# Patient Record
Sex: FEMALE | Race: BLACK OR AFRICAN AMERICAN | HISPANIC OR LATINO | Employment: FULL TIME | URBAN - METROPOLITAN AREA
[De-identification: names, ages, dates, MRNs, and addresses within clinical notes are randomized per-mention and may not be internally consistent; named-entity substitution may affect disease eponyms.]

---

## 2017-02-27 ENCOUNTER — TRANSCRIBE ORDERS (OUTPATIENT)
Dept: ADMINISTRATIVE | Facility: HOSPITAL | Age: 27
End: 2017-02-27

## 2017-02-27 DIAGNOSIS — E04.1 NONTOXIC UNINODULAR GOITER: Primary | ICD-10-CM

## 2017-03-03 ENCOUNTER — HOSPITAL ENCOUNTER (OUTPATIENT)
Dept: ULTRASOUND IMAGING | Facility: HOSPITAL | Age: 27
Discharge: HOME | End: 2017-03-03
Payer: COMMERCIAL

## 2017-03-03 DIAGNOSIS — E04.1 NONTOXIC UNINODULAR GOITER: ICD-10-CM

## 2017-03-03 PROCEDURE — 76536 US EXAM OF HEAD AND NECK: CPT

## 2017-03-28 ENCOUNTER — HOSPITAL ENCOUNTER (EMERGENCY)
Facility: HOSPITAL | Age: 27
Discharge: HOME | End: 2017-03-28
Attending: EMERGENCY MEDICINE | Admitting: EMERGENCY MEDICINE
Payer: COMMERCIAL

## 2017-03-28 ENCOUNTER — APPOINTMENT (EMERGENCY)
Dept: RADIOLOGY | Facility: HOSPITAL | Age: 27
End: 2017-03-28
Payer: COMMERCIAL

## 2017-03-28 VITALS
TEMPERATURE: 98.3 F | DIASTOLIC BLOOD PRESSURE: 70 MMHG | OXYGEN SATURATION: 99 % | WEIGHT: 224.21 LBS | HEART RATE: 65 BPM | RESPIRATION RATE: 18 BRPM | SYSTOLIC BLOOD PRESSURE: 120 MMHG

## 2017-03-28 DIAGNOSIS — M79.605 LEFT LEG PAIN: Primary | ICD-10-CM

## 2017-03-28 PROCEDURE — 73502 X-RAY EXAM HIP UNI 2-3 VIEWS: CPT

## 2017-03-28 PROCEDURE — 99283 EMERGENCY DEPT VISIT LOW MDM: CPT

## 2017-03-28 RX ORDER — PANTOPRAZOLE SODIUM 40 MG/1
40 TABLET, DELAYED RELEASE ORAL DAILY
COMMUNITY
End: 2021-03-05 | Stop reason: SDUPTHER

## 2017-03-28 RX ORDER — LIDOCAINE 50 MG/G
1 PATCH TOPICAL DAILY
Status: DISCONTINUED | OUTPATIENT
Start: 2017-03-28 | End: 2017-03-28 | Stop reason: HOSPADM

## 2017-03-28 RX ORDER — OXYCODONE HYDROCHLORIDE AND ACETAMINOPHEN 5; 325 MG/1; MG/1
1 TABLET ORAL ONCE
Status: COMPLETED | OUTPATIENT
Start: 2017-03-28 | End: 2017-03-28

## 2017-03-28 RX ORDER — MORPHINE SULFATE 30 MG/1
15 TABLET ORAL EVERY 4 HOURS PRN
Qty: 10 TABLET | Refills: 0 | Status: SHIPPED | OUTPATIENT
Start: 2017-03-28 | End: 2017-05-11

## 2017-03-28 RX ADMIN — OXYCODONE HYDROCHLORIDE AND ACETAMINOPHEN 1 TABLET: 5; 325 TABLET ORAL at 11:55

## 2017-03-28 RX ADMIN — LIDOCAINE 1 PATCH: 50 PATCH TOPICAL at 11:55

## 2017-05-11 ENCOUNTER — HOSPITAL ENCOUNTER (EMERGENCY)
Facility: HOSPITAL | Age: 27
Discharge: HOME | End: 2017-05-11
Attending: EMERGENCY MEDICINE | Admitting: EMERGENCY MEDICINE
Payer: COMMERCIAL

## 2017-05-11 VITALS
HEART RATE: 78 BPM | TEMPERATURE: 97.8 F | SYSTOLIC BLOOD PRESSURE: 112 MMHG | OXYGEN SATURATION: 100 % | DIASTOLIC BLOOD PRESSURE: 62 MMHG | RESPIRATION RATE: 14 BRPM | WEIGHT: 177.8 LBS

## 2017-05-11 DIAGNOSIS — R11.2 NAUSEA & VOMITING: Primary | ICD-10-CM

## 2017-05-11 DIAGNOSIS — R19.7 DIARRHEA, UNSPECIFIED TYPE: ICD-10-CM

## 2017-05-11 LAB
ALBUMIN SERPL BCP-MCNC: 3.9 G/DL (ref 3.5–5)
ALP SERPL-CCNC: 91 U/L (ref 46–116)
ALT SERPL W P-5'-P-CCNC: 23 U/L (ref 12–78)
ANION GAP SERPL CALCULATED.3IONS-SCNC: 14 MMOL/L (ref 4–13)
AST SERPL W P-5'-P-CCNC: 41 U/L (ref 5–45)
BASOPHILS # BLD AUTO: 0.01 THOUSANDS/ΜL (ref 0–0.1)
BASOPHILS NFR BLD AUTO: 0 % (ref 0–1)
BILIRUB SERPL-MCNC: 0.4 MG/DL (ref 0.2–1)
BUN SERPL-MCNC: 10 MG/DL (ref 5–25)
CALCIUM SERPL-MCNC: 8.9 MG/DL (ref 8.3–10.1)
CHLORIDE SERPL-SCNC: 104 MMOL/L (ref 100–108)
CLARITY, POC: CLEAR
CO2 SERPL-SCNC: 20 MMOL/L (ref 21–32)
COLOR, POC: NORMAL
CREAT SERPL-MCNC: 0.85 MG/DL (ref 0.6–1.3)
EOSINOPHIL # BLD AUTO: 0.05 THOUSAND/ΜL (ref 0–0.61)
EOSINOPHIL NFR BLD AUTO: 1 % (ref 0–6)
ERYTHROCYTE [DISTWIDTH] IN BLOOD BY AUTOMATED COUNT: 12.5 % (ref 11.6–15.1)
EXT BILIRUBIN, UA: NORMAL
EXT BLOOD URINE: NORMAL
EXT GLUCOSE, UA: NORMAL
EXT KETONES: NORMAL
EXT NITRITE, UA: NORMAL
EXT PH, UA: 6
EXT PROTEIN, UA: 30
EXT SPECIFIC GRAVITY, UA: 1.02
EXT UROBILINOGEN: 0.2
GFR SERPL CREATININE-BSD FRML MDRD: >60 ML/MIN/1.73SQ M
GLUCOSE SERPL-MCNC: 87 MG/DL (ref 65–140)
HCG UR QL: NEGATIVE
HCT VFR BLD AUTO: 43.8 % (ref 34.8–46.1)
HGB BLD-MCNC: 15.2 G/DL (ref 11.5–15.4)
LIPASE SERPL-CCNC: 100 U/L (ref 73–393)
LYMPHOCYTES # BLD AUTO: 0.69 THOUSANDS/ΜL (ref 0.6–4.47)
LYMPHOCYTES NFR BLD AUTO: 9 % (ref 14–44)
MCH RBC QN AUTO: 28.4 PG (ref 26.8–34.3)
MCHC RBC AUTO-ENTMCNC: 34.7 G/DL (ref 31.4–37.4)
MCV RBC AUTO: 82 FL (ref 82–98)
MONOCYTES # BLD AUTO: 0.84 THOUSAND/ΜL (ref 0.17–1.22)
MONOCYTES NFR BLD AUTO: 10 % (ref 4–12)
NEUTROPHILS # BLD AUTO: 6.52 THOUSANDS/ΜL (ref 1.85–7.62)
NEUTS SEG NFR BLD AUTO: 80 % (ref 43–75)
PLATELET # BLD AUTO: 274 THOUSANDS/UL (ref 149–390)
PMV BLD AUTO: 10.1 FL (ref 8.9–12.7)
POTASSIUM SERPL-SCNC: 4.7 MMOL/L (ref 3.5–5.3)
PROT SERPL-MCNC: 9 G/DL (ref 6.4–8.2)
RBC # BLD AUTO: 5.36 MILLION/UL (ref 3.81–5.12)
SODIUM SERPL-SCNC: 138 MMOL/L (ref 136–145)
WBC # BLD AUTO: 8.11 THOUSAND/UL (ref 4.31–10.16)
WBC # BLD EST: NORMAL 10*3/UL

## 2017-05-11 PROCEDURE — 81002 URINALYSIS NONAUTO W/O SCOPE: CPT | Performed by: PHYSICIAN ASSISTANT

## 2017-05-11 PROCEDURE — A9270 NON-COVERED ITEM OR SERVICE: HCPCS | Performed by: PHYSICIAN ASSISTANT

## 2017-05-11 PROCEDURE — 99284 EMERGENCY DEPT VISIT MOD MDM: CPT

## 2017-05-11 PROCEDURE — 83690 ASSAY OF LIPASE: CPT | Performed by: PHYSICIAN ASSISTANT

## 2017-05-11 PROCEDURE — 85025 COMPLETE CBC W/AUTO DIFF WBC: CPT | Performed by: PHYSICIAN ASSISTANT

## 2017-05-11 PROCEDURE — 80053 COMPREHEN METABOLIC PANEL: CPT | Performed by: PHYSICIAN ASSISTANT

## 2017-05-11 PROCEDURE — 81025 URINE PREGNANCY TEST: CPT | Performed by: PHYSICIAN ASSISTANT

## 2017-05-11 PROCEDURE — 96374 THER/PROPH/DIAG INJ IV PUSH: CPT

## 2017-05-11 PROCEDURE — 96361 HYDRATE IV INFUSION ADD-ON: CPT

## 2017-05-11 PROCEDURE — 36415 COLL VENOUS BLD VENIPUNCTURE: CPT | Performed by: PHYSICIAN ASSISTANT

## 2017-05-11 RX ORDER — DICYCLOMINE HCL 20 MG
20 TABLET ORAL ONCE
Status: COMPLETED | OUTPATIENT
Start: 2017-05-11 | End: 2017-05-11

## 2017-05-11 RX ORDER — ONDANSETRON 4 MG/1
4 TABLET, ORALLY DISINTEGRATING ORAL EVERY 8 HOURS PRN
Qty: 15 TABLET | Refills: 0 | Status: SHIPPED | OUTPATIENT
Start: 2017-05-11 | End: 2018-03-20

## 2017-05-11 RX ORDER — DICYCLOMINE HCL 20 MG
20 TABLET ORAL EVERY 6 HOURS
Qty: 12 TABLET | Refills: 0 | Status: SHIPPED | OUTPATIENT
Start: 2017-05-11 | End: 2018-03-20

## 2017-05-11 RX ORDER — ONDANSETRON 2 MG/ML
4 INJECTION INTRAMUSCULAR; INTRAVENOUS ONCE
Status: COMPLETED | OUTPATIENT
Start: 2017-05-11 | End: 2017-05-11

## 2017-05-11 RX ADMIN — ONDANSETRON 4 MG: 2 INJECTION INTRAMUSCULAR; INTRAVENOUS at 08:58

## 2017-05-11 RX ADMIN — SODIUM CHLORIDE 1000 ML: 0.9 INJECTION, SOLUTION INTRAVENOUS at 08:54

## 2017-05-11 RX ADMIN — DICYCLOMINE HYDROCHLORIDE 20 MG: 20 TABLET ORAL at 08:58

## 2018-01-18 NOTE — MISCELLANEOUS
Message  Return to work or school:   Nehemias Romero is under my professional care   She was seen in my office on 10/25/2016   Please excuse from work 10/25/2016 due to illness           Signatures   Electronically signed by : Alaina Aguilar [facility]; Oct 25 2016  6:00PM EST                       (Author)    Electronically signed by : Alaina Aguilar [facility]; Oct 27 2016  8:19AM EST

## 2018-01-18 NOTE — MISCELLANEOUS
Message  Return to work or school:    She is able to return to work on  12/22/2016            Signatures   Electronically signed by : Barry Galindo DO; Dec 20 2016  6:37PM EST                       (Author)

## 2018-03-20 ENCOUNTER — TELEPHONE (OUTPATIENT)
Dept: GASTROENTEROLOGY | Facility: CLINIC | Age: 28
End: 2018-03-20

## 2018-03-20 ENCOUNTER — OFFICE VISIT (OUTPATIENT)
Dept: GASTROENTEROLOGY | Facility: CLINIC | Age: 28
End: 2018-03-20
Payer: COMMERCIAL

## 2018-03-20 VITALS
WEIGHT: 197.4 LBS | HEART RATE: 74 BPM | BODY MASS INDEX: 33.7 KG/M2 | TEMPERATURE: 98.7 F | HEIGHT: 64 IN | SYSTOLIC BLOOD PRESSURE: 96 MMHG | DIASTOLIC BLOOD PRESSURE: 73 MMHG

## 2018-03-20 DIAGNOSIS — K92.1 BLOOD IN STOOL: Primary | ICD-10-CM

## 2018-03-20 DIAGNOSIS — R10.9 ABDOMINAL CRAMPING: ICD-10-CM

## 2018-03-20 DIAGNOSIS — K59.00 CONSTIPATION, UNSPECIFIED CONSTIPATION TYPE: ICD-10-CM

## 2018-03-20 PROCEDURE — 99242 OFF/OP CONSLTJ NEW/EST SF 20: CPT | Performed by: INTERNAL MEDICINE

## 2018-03-20 RX ORDER — POLYETHYLENE GLYCOL 3350 17 G/17G
17 POWDER, FOR SOLUTION ORAL DAILY
Qty: 500 G | Refills: 1 | Status: SHIPPED | OUTPATIENT
Start: 2018-03-20 | End: 2019-11-19

## 2018-03-20 RX ORDER — PANTOPRAZOLE SODIUM 40 MG/1
40 TABLET, DELAYED RELEASE ORAL DAILY
Qty: 30 TABLET | Refills: 5 | Status: SHIPPED | OUTPATIENT
Start: 2018-03-20 | End: 2019-04-16 | Stop reason: SDUPTHER

## 2018-03-20 NOTE — TELEPHONE ENCOUNTER
Dr Kyle Mullins pt    Pt was seen today, she would like to know if an excuse note for work can be provided   Please advise the patient when the note is available, [phone]

## 2018-03-20 NOTE — LETTER
March 20, 2018     Luigi Floyd MD  [address] [place] [address]    Patient: Ada Mask   YOB: 1990   Date of Visit: 3/20/2018       Dear Dr Lindsay Lopez: Thank you for referring Thiago Cerda to me for evaluation  Below are my notes for this consultation  If you have questions, please do not hesitate to call me  I look forward to following your patient along with you           Sincerely,        Jose Burciaga MD        CC: No Recipients

## 2018-03-20 NOTE — PROGRESS NOTES
[facility] - Outpatient Consultation  Mellisa Boston 33 y o  female MRN: [id number]  Encounter: [id number]          ASSESSMENT AND PLAN:      1  Blood in stool  - likely secondary to hemorrhoids bleeding based on her history and recent colonoscopy findings  2  Constipation, unspecified constipation type  - start miralax  She wasConsulted on the correct use MiraLax  -patient was counseled on importance of increased fluid intake and fiber intake     ______________________________________________________________________    HPI:  60-year-old female presented for evaluation of abdominal cramps, blood in stool and constipation  Patient reported she underwent EGD and colonoscopy 2 years ago in [place] and was found to have gastritis and hemorrhoids  She recently reported hard stool and constipation  She takes over-the-counter stool softener with partial relief of her symptoms  She reported some pain in the rectum when she is having hard stool  She has been having blood in the stool in the past 2 weeks  She denies weight loss  She denies family history of colon cancer  She has history of partial thyroidectomy and Hashimoto's thyroiditis  REVIEW OF SYSTEMS:    CONSTITUTIONAL: Denies any fever, chills, rigors, and weight loss  HEENT: No earache or tinnitus  Denies hearing loss or visual disturbances  CARDIOVASCULAR: No chest pain or palpitations  RESPIRATORY: Denies any cough, hemoptysis, shortness of breath or dyspnea on exertion  GASTROINTESTINAL: As noted in the History of Present Illness  GENITOURINARY: No problems with urination  Denies any hematuria or dysuria  NEUROLOGIC: No dizziness or vertigo, denies headaches  MUSCULOSKELETAL: Denies any muscle or joint pain  SKIN: Denies skin rashes or itching  ENDOCRINE: Denies excessive thirst  Denies intolerance to heat or cold  PSYCHOSOCIAL: Denies depression or anxiety  Denies any recent memory loss  Historical Information   Past Medical History:   Diagnosis Date    Anemia     Anxiety     Asthma     Depression     Disease of thyroid gland     GERD (gastroesophageal reflux disease)      Past Surgical History:   Procedure Laterality Date    COLONOSCOPY  2015    THYROIDECTOMY, PARTIAL  2017     Social History   History   Alcohol Use    Yes     Comment: socially      History   Drug Use No     History   Smoking Status    Never Smoker   Smokeless Tobacco    Never Used     Family History   Problem Relation Age of Onset    Hypertension Father     Diabetes Maternal Grandfather     Breast cancer Paternal Grandmother     Cancer Paternal Grandmother      thyroid       Meds/Allergies       Current Outpatient Prescriptions:     ALPRAZolam (XANAX PO)    norethindrone-ethinyl estradiol (JUNEL FE 1/20) 1-20 MG-MCG per tablet    pantoprazole (PROTONIX) 40 mg tablet    Sertraline HCl (ZOLOFT PO)    Allergies   Allergen Reactions    Pollen Extract Itching           Objective     Blood pressure 96/73, pulse 74, temperature 98 7 °F (37 1 °C), temperature source Tympanic, height 5' 4" (1 626 m), weight 89 5 kg (197 lb 6 4 oz)  PHYSICAL EXAM:      General Appearance:   Alert, cooperative, no distress   HEENT:   Normocephalic, atraumatic, anicteric      Neck:  Supple, symmetrical, trachea midline   Lungs:   Clear to auscultation bilaterally; no rales, rhonchi or wheezing; respirations unlabored    Heart[de-identified]   Regular rate and rhythm; no murmur, rub, or gallop  Abdomen:   Soft, non-tender, non-distended; normal bowel sounds; no masses, no organomegaly    Genitalia:   Deferred    Rectal:   Deferred    Extremities:  No cyanosis, clubbing or edema    Pulses:  2+ and symmetric    Skin:  No jaundice, rashes, or lesions    Lymph nodes:  No palpable cervical lymphadenopathy        Lab Results:   No visits with results within 1 Day(s) from this visit     Latest known visit with results is:   Admission on 05/11/2017, Discharged on 05/11/2017   Component Date Value    WBC 05/11/2017 8 11     RBC 05/11/2017 5 36*    Hemoglobin 05/11/2017 15 2     Hematocrit 05/11/2017 43 8     MCV 05/11/2017 82     MCH 05/11/2017 28 4     MCHC 05/11/2017 34 7     RDW 05/11/2017 12 5     MPV 05/11/2017 10 1     Platelets 42/45/8773 274     Neutrophils Relative 05/11/2017 80*    Lymphocytes Relative 05/11/2017 9*    Monocytes Relative 05/11/2017 10     Eosinophils Relative 05/11/2017 1     Basophils Relative 05/11/2017 0     Neutrophils Absolute 05/11/2017 6 52     Lymphocytes Absolute 05/11/2017 0 69     Monocytes Absolute 05/11/2017 0 84     Eosinophils Absolute 05/11/2017 0 05     Basophils Absolute 05/11/2017 0 01     Sodium 05/11/2017 138     Potassium 05/11/2017 4 7     Chloride 05/11/2017 104     CO2 05/11/2017 20*    Anion Gap 05/11/2017 14*    BUN 05/11/2017 10     Creatinine 05/11/2017 0 85     Glucose 05/11/2017 87     Calcium 05/11/2017 8 9     AST 05/11/2017 41     ALT 05/11/2017 23     Alkaline Phosphatase 05/11/2017 91     Total Protein 05/11/2017 9 0*    Albumin 05/11/2017 3 9     Total Bilirubin 05/11/2017 0 40     eGFR 05/11/2017 >60 0     Lipase 05/11/2017 100     Color, UA 05/11/2017 megan     Clarity, UA 05/11/2017 clear     EXT Glucose, UA 05/11/2017 neg     EXT Bilirubin, UA (Ref: * 05/11/2017 small     EXT Ketones, UA (Ref: Ne* 05/11/2017 trace     EXT Spec Grav, UA 05/11/2017 1 02     EXT pH, UA 05/11/2017 6     EXT Protein, UA (Ref: Ne* 05/11/2017 30     EXT Urobilinogen, UA (Re* 05/11/2017 0 2     EXT Nitrite, UA (Ref: Ne* 05/11/2017 neg     EXT Leukocytes, UA (Ref:* 05/11/2017 neg     EXT Blood, UA (Ref: Nega* 05/11/2017 small     Preg Test, Ur 05/11/2017 negative          Radiology Results:   No results found

## 2018-07-18 ENCOUNTER — OFFICE VISIT (OUTPATIENT)
Dept: URGENT CARE | Age: 28
End: 2018-07-18
Payer: COMMERCIAL

## 2018-07-18 VITALS
HEIGHT: 64 IN | OXYGEN SATURATION: 99 % | SYSTOLIC BLOOD PRESSURE: 106 MMHG | DIASTOLIC BLOOD PRESSURE: 69 MMHG | TEMPERATURE: 98 F | BODY MASS INDEX: 33.97 KG/M2 | RESPIRATION RATE: 18 BRPM | HEART RATE: 74 BPM | WEIGHT: 199 LBS

## 2018-07-18 DIAGNOSIS — J01.00 ACUTE NON-RECURRENT MAXILLARY SINUSITIS: Primary | ICD-10-CM

## 2018-07-18 DIAGNOSIS — K52.9 GASTROENTERITIS: ICD-10-CM

## 2018-07-18 PROBLEM — E06.3 HASHIMOTO'S THYROIDITIS: Status: ACTIVE | Noted: 2018-06-04

## 2018-07-18 PROBLEM — E04.1 THYROID NODULE: Status: ACTIVE | Noted: 2017-04-25

## 2018-07-18 PROBLEM — E89.0 S/P PARTIAL THYROIDECTOMY: Status: ACTIVE | Noted: 2018-06-04

## 2018-07-18 PROBLEM — F41.9 ANXIETY: Status: ACTIVE | Noted: 2017-04-25

## 2018-07-18 PROCEDURE — 99283 EMERGENCY DEPT VISIT LOW MDM: CPT | Performed by: FAMILY MEDICINE

## 2018-07-18 PROCEDURE — G0382 LEV 3 HOSP TYPE B ED VISIT: HCPCS | Performed by: FAMILY MEDICINE

## 2018-07-18 RX ORDER — AMOXICILLIN AND CLAVULANATE POTASSIUM 875; 125 MG/1; MG/1
1 TABLET, FILM COATED ORAL EVERY 12 HOURS SCHEDULED
Qty: 20 TABLET | Refills: 0 | Status: SHIPPED | OUTPATIENT
Start: 2018-07-18 | End: 2018-07-28

## 2018-07-18 NOTE — LETTER
July 18, 2018     Patient: Scar Myers   YOB: 1990   Date of Visit: 7/18/2018       To Whom It May Concern: It is my medical opinion that Jorge Rashid may return to work on 7/20/2018  If you have any questions or concerns, please don't hesitate to call           Sincerely,  Immanuel Tom PA-C

## 2018-07-18 NOTE — PROGRESS NOTES
[facility]        NAME: Lucrecia Wheeler is a 33 y o  female  : 1990    MRN: [id number]  DATE: 2018  TIME: 1:01 PM    Assessment and Plan   Acute non-recurrent maxillary sinusitis [J01 00]  1  Acute non-recurrent maxillary sinusitis  amoxicillin-clavulanate (AUGMENTIN) 875-125 mg per tablet   2  Gastroenteritis       Patient Instructions   Take antibiotic as directed with food and water  While on this medication begin a probiotic  Continue decongestant and nasal steroid  Use a cool mist humidifier at bedtime  Follow BRAT (bananas, rice, apples, toast) diet as discussed  Once feeling up to it, you can begin to introduce new foods and advance diet as it is tolerated  Take Pepto-bismol or Tums as directed for nausea and stomach upset  Stay hydrated, drinking plenty of water and gatorade  Follow up with your family doctor in 3-5 days  Proceed to the ER if symptoms worsen  Chief Complaint     Chief Complaint   Patient presents with    Cold Like Symptoms     sinus congestion, x monday, nause , diarrhea and stomach cramps since last night         History of Present Illness         60-year-old female presenting with complaints of nasal congestion and sinus pressure x4 days  Sx associated with chills, b/l ear pressure, and runny nose  She notes these symptoms seem to be no better despite tx with Sudafed, Benadryl and Affrin  Yesterday evening she developed upset stomach, nausea, and diarrhea  No vomiting  No recent travel  Works at a  but no known sick contacts  Review of Systems   Review of Systems   Constitutional: Negative for fever  Respiratory: Negative for cough, shortness of breath and wheezing  Gastrointestinal: Negative for vomiting  Musculoskeletal: Negative for arthralgias and myalgias  Skin: Negative for rash  Neurological: Negative for headaches       Current Medications       Current Outpatient Prescriptions:     ALPRAZolam (XANAX PO), Take by mouth as needed, Disp: , Rfl:     amoxicillin-clavulanate (AUGMENTIN) 875-125 mg per tablet, Take 1 tablet by mouth every 12 (twelve) hours for 10 days, Disp: 20 tablet, Rfl: 0    norethindrone-ethinyl estradiol (JUNEL FE 1/20) 1-20 MG-MCG per tablet, Take 1 tablet by mouth daily, Disp: , Rfl:     pantoprazole (PROTONIX) 40 mg tablet, Take 40 mg by mouth daily, Disp: , Rfl:     pantoprazole (PROTONIX) 40 mg tablet, Take 1 tablet (40 mg total) by mouth daily, Disp: 30 tablet, Rfl: 5    polyethylene glycol (GLYCOLAX) powder, Take 17 g by mouth daily, Disp: 500 g, Rfl: 1    Sertraline HCl (ZOLOFT PO), Take by mouth daily, Disp: , Rfl:     Current Allergies     Allergies as of 07/18/2018 - Reviewed 07/18/2018   Allergen Reaction Noted    Pollen extract Itching 04/25/2017            The following portions of the patient's history were reviewed and updated as appropriate: allergies, current medications, past family history, past medical history, past social history, past surgical history and problem list      Past Medical History:   Diagnosis Date    Anemia     Anxiety     Asthma     Depression     Disease of thyroid gland     GERD (gastroesophageal reflux disease)        Past Surgical History:   Procedure Laterality Date    COLONOSCOPY  2015    THYROIDECTOMY, PARTIAL  2017       Family History   Problem Relation Age of Onset    Hypertension Father     Diabetes Maternal Grandfather     Breast cancer Paternal Grandmother     Cancer Paternal Grandmother         thyroid   Medications have been verified  Objective   /69   Pulse 74   Temp 98 °F (36 7 °C) (Temporal)   Resp 18   Ht 5' 4" (1 626 m)   Wt 90 3 kg (199 lb)   LMP 07/18/2018   SpO2 99%   BMI 34 16 kg/m²      Physical Exam     Physical Exam   Constitutional: She is oriented to person, place, and time  She appears well-developed and well-nourished  No distress  HENT:   Head: Normocephalic and atraumatic     Right Ear: Hearing, tympanic membrane, external ear and ear canal normal    Left Ear: Hearing, tympanic membrane, external ear and ear canal normal    Nose: Mucosal edema present  No rhinorrhea  Right sinus exhibits maxillary sinus tenderness  Left sinus exhibits maxillary sinus tenderness  Mouth/Throat: Uvula is midline, oropharynx is clear and moist and mucous membranes are normal  Mucous membranes are not pale and not dry  No oropharyngeal exudate, posterior oropharyngeal edema or posterior oropharyngeal erythema  Eyes: Conjunctivae are normal  Right eye exhibits no discharge  Left eye exhibits no discharge  No scleral icterus  Cardiovascular: Normal rate, regular rhythm and normal heart sounds  Pulmonary/Chest: Effort normal and breath sounds normal  No respiratory distress  She has no wheezes  She has no rales  Abdominal: Soft  Bowel sounds are normal  She exhibits no distension  There is no tenderness  Neurological: She is oriented to person, place, and time  She has normal reflexes  No cranial nerve deficit  Nursing note and vitals reviewed

## 2018-07-18 NOTE — PATIENT INSTRUCTIONS
Take antibiotic as directed with food and water  While on this medication begin a probiotic  Continue decongestant and nasal steroid  Use a cool mist humidifier at bedtime  Follow BRAT (bananas, rice, apples, toast) diet as discussed  Once feeling up to it, you can begin to introduce new foods and advance diet as it is tolerated  Take Pepto-bismol or Tums as directed for nausea and stomach upset  Stay hydrated, drinking plenty of water and gatorade  Follow up with your family doctor in 3-5 days  Proceed to the ER if symptoms worsen

## 2018-07-20 ENCOUNTER — HOSPITAL ENCOUNTER (EMERGENCY)
Facility: HOSPITAL | Age: 28
Discharge: HOME | End: 2018-07-20
Attending: EMERGENCY MEDICINE | Admitting: EMERGENCY MEDICINE
Payer: COMMERCIAL

## 2018-07-20 ENCOUNTER — APPOINTMENT (EMERGENCY)
Dept: CT IMAGING | Facility: HOSPITAL | Age: 28
End: 2018-07-20
Payer: COMMERCIAL

## 2018-07-20 VITALS
OXYGEN SATURATION: 100 % | SYSTOLIC BLOOD PRESSURE: 114 MMHG | TEMPERATURE: 98.4 F | DIASTOLIC BLOOD PRESSURE: 74 MMHG | RESPIRATION RATE: 18 BRPM | HEART RATE: 60 BPM

## 2018-07-20 DIAGNOSIS — B34.9 ACUTE VIRAL SYNDROME: Primary | ICD-10-CM

## 2018-07-20 DIAGNOSIS — A08.4 VIRAL GASTROENTERITIS: ICD-10-CM

## 2018-07-20 LAB
ALBUMIN SERPL BCP-MCNC: 3.4 G/DL (ref 3.5–5)
ALP SERPL-CCNC: 87 U/L (ref 46–116)
ALT SERPL W P-5'-P-CCNC: 21 U/L (ref 12–78)
ANION GAP SERPL CALCULATED.3IONS-SCNC: 7 MMOL/L (ref 4–13)
AST SERPL W P-5'-P-CCNC: 11 U/L (ref 5–45)
BACTERIA UR QL AUTO: ABNORMAL /HPF
BASOPHILS # BLD AUTO: 0.05 THOUSANDS/ΜL (ref 0–0.1)
BASOPHILS NFR BLD AUTO: 1 % (ref 0–1)
BILIRUB DIRECT SERPL-MCNC: 0.07 MG/DL (ref 0–0.2)
BILIRUB SERPL-MCNC: 0.4 MG/DL (ref 0.2–1)
BILIRUB UR QL STRIP: NEGATIVE
BUN SERPL-MCNC: 12 MG/DL (ref 5–25)
CALCIUM SERPL-MCNC: 8.7 MG/DL (ref 8.3–10.1)
CHLORIDE SERPL-SCNC: 106 MMOL/L (ref 100–108)
CLARITY UR: CLEAR
CO2 SERPL-SCNC: 25 MMOL/L (ref 21–32)
COLOR UR: YELLOW
CREAT SERPL-MCNC: 0.85 MG/DL (ref 0.6–1.3)
EOSINOPHIL # BLD AUTO: 0.47 THOUSAND/ΜL (ref 0–0.61)
EOSINOPHIL NFR BLD AUTO: 6 % (ref 0–6)
ERYTHROCYTE [DISTWIDTH] IN BLOOD BY AUTOMATED COUNT: 12.6 % (ref 11.6–15.1)
EXT PREG TEST URINE: NEGATIVE
GFR SERPL CREATININE-BSD FRML MDRD: 109 ML/MIN/1.73SQ M
GLUCOSE SERPL-MCNC: 82 MG/DL (ref 65–140)
GLUCOSE UR STRIP-MCNC: NEGATIVE MG/DL
HCT VFR BLD AUTO: 39.5 % (ref 34.8–46.1)
HGB BLD-MCNC: 13.8 G/DL (ref 11.5–15.4)
HGB UR QL STRIP.AUTO: ABNORMAL
KETONES UR STRIP-MCNC: NEGATIVE MG/DL
LEUKOCYTE ESTERASE UR QL STRIP: NEGATIVE
LIPASE SERPL-CCNC: 103 U/L (ref 73–393)
LYMPHOCYTES # BLD AUTO: 1.58 THOUSANDS/ΜL (ref 0.6–4.47)
LYMPHOCYTES NFR BLD AUTO: 21 % (ref 14–44)
MCH RBC QN AUTO: 29.1 PG (ref 26.8–34.3)
MCHC RBC AUTO-ENTMCNC: 34.9 G/DL (ref 31.4–37.4)
MCV RBC AUTO: 83 FL (ref 82–98)
MONOCYTES # BLD AUTO: 0.57 THOUSAND/ΜL (ref 0.17–1.22)
MONOCYTES NFR BLD AUTO: 8 % (ref 4–12)
MUCOUS THREADS UR QL AUTO: ABNORMAL
NEUTROPHILS # BLD AUTO: 4.74 THOUSANDS/ΜL (ref 1.85–7.62)
NEUTS SEG NFR BLD AUTO: 64 % (ref 43–75)
NITRITE UR QL STRIP: NEGATIVE
NON-SQ EPI CELLS URNS QL MICRO: ABNORMAL /HPF
PH UR STRIP.AUTO: 5.5 [PH] (ref 4.5–8)
PLATELET # BLD AUTO: 267 THOUSANDS/UL (ref 149–390)
PMV BLD AUTO: 9.8 FL (ref 8.9–12.7)
POTASSIUM SERPL-SCNC: 3.7 MMOL/L (ref 3.5–5.3)
PROT SERPL-MCNC: 7.4 G/DL (ref 6.4–8.2)
PROT UR STRIP-MCNC: NEGATIVE MG/DL
RBC # BLD AUTO: 4.75 MILLION/UL (ref 3.81–5.12)
RBC #/AREA URNS AUTO: ABNORMAL /HPF
SODIUM SERPL-SCNC: 138 MMOL/L (ref 136–145)
SP GR UR STRIP.AUTO: 1.02 (ref 1–1.03)
UROBILINOGEN UR QL STRIP.AUTO: 0.2 E.U./DL
WBC # BLD AUTO: 7.41 THOUSAND/UL (ref 4.31–10.16)
WBC #/AREA URNS AUTO: ABNORMAL /HPF

## 2018-07-20 PROCEDURE — 80048 BASIC METABOLIC PNL TOTAL CA: CPT | Performed by: EMERGENCY MEDICINE

## 2018-07-20 PROCEDURE — 80076 HEPATIC FUNCTION PANEL: CPT | Performed by: EMERGENCY MEDICINE

## 2018-07-20 PROCEDURE — 96374 THER/PROPH/DIAG INJ IV PUSH: CPT

## 2018-07-20 PROCEDURE — 74177 CT ABD & PELVIS W/CONTRAST: CPT

## 2018-07-20 PROCEDURE — 83690 ASSAY OF LIPASE: CPT | Performed by: EMERGENCY MEDICINE

## 2018-07-20 PROCEDURE — 96361 HYDRATE IV INFUSION ADD-ON: CPT

## 2018-07-20 PROCEDURE — 85025 COMPLETE CBC W/AUTO DIFF WBC: CPT | Performed by: EMERGENCY MEDICINE

## 2018-07-20 PROCEDURE — 81025 URINE PREGNANCY TEST: CPT | Performed by: EMERGENCY MEDICINE

## 2018-07-20 PROCEDURE — 36415 COLL VENOUS BLD VENIPUNCTURE: CPT | Performed by: EMERGENCY MEDICINE

## 2018-07-20 PROCEDURE — 99284 EMERGENCY DEPT VISIT MOD MDM: CPT

## 2018-07-20 PROCEDURE — 81001 URINALYSIS AUTO W/SCOPE: CPT | Performed by: EMERGENCY MEDICINE

## 2018-07-20 RX ORDER — ONDANSETRON 4 MG/1
4 TABLET, ORALLY DISINTEGRATING ORAL EVERY 8 HOURS PRN
Qty: 20 TABLET | Refills: 0 | Status: SHIPPED | OUTPATIENT
Start: 2018-07-20 | End: 2019-11-15 | Stop reason: ALTCHOICE

## 2018-07-20 RX ORDER — ONDANSETRON 2 MG/ML
4 INJECTION INTRAMUSCULAR; INTRAVENOUS ONCE
Status: COMPLETED | OUTPATIENT
Start: 2018-07-20 | End: 2018-07-20

## 2018-07-20 RX ADMIN — ONDANSETRON 4 MG: 2 INJECTION INTRAMUSCULAR; INTRAVENOUS at 09:24

## 2018-07-20 RX ADMIN — IOHEXOL 100 ML: 350 INJECTION, SOLUTION INTRAVENOUS at 10:15

## 2018-07-20 RX ADMIN — SODIUM CHLORIDE 1000 ML: 0.9 INJECTION, SOLUTION INTRAVENOUS at 09:23

## 2018-07-20 NOTE — DISCHARGE INSTRUCTIONS
Gastroenteritis, Ambulatory Care   GENERAL INFORMATION:   Gastroenteritis , or stomach flu, is an infection of the stomach and intestines  It is caused by bacteria, parasites, or viruses  Common symptoms include the following:   · Diarrhea or gas    · Nausea, vomiting, or poor appetite    · Abdominal cramps, pain, or gurgling    · Fever    · Tiredness or weakness    · Headaches or muscle aches with any of the above symptoms  Seek immediate care for the following symptoms:   · Blood in your diarrhea    · Unable to stop vomiting    · No urinating for 12 hours    · Legs or arms that are blue    · Trouble breathing or a very fast pulse    · Lightheadedness or dizziness  Treatment for gastroenteritis  may include medicines to stop your diarrhea and vomiting  You may also need medicines to treat an infection caused by bacteria or parasites  Manage your symptoms:   · Drink liquids as directed  Ask how much liquid to drink each day and which liquids are best for you  You may need to drink more liquids than usual to prevent dehydration  Suck on ice or take small sips of liquids often if you have trouble keeping liquids down  · Drink oral rehydration solution (ORS) as directed  An ORS contains water, salts, and sugar that are needed to replace lost body fluids  Ask what kind of ORS to use and how much to drink  · Eat bland foods  When you feel hungry, begin to eat soft, bland foods  Examples are bananas, clear soup, potatoes, and applesauce  Do not eat dairy products, alcohol, sugary drinks, or caffeine until you feel better  Prevent the spread of germs:   · Wash your hands often  Use soap and water  Wash your hands after you use the bathroom, change a child's diapers, or sneeze  Wash your hands before you prepare or eat food  · Clean surfaces and do laundry often  Wash your clothes and towels separately from the rest of the laundry   Clean surfaces in your home with antibacterial  or bleach  · Clean food thoroughly and cook safely  Wash raw vegetables before you cook  Cook meat, fish, and eggs fully  Do not use the same dishes for raw meat as you do for other foods  Refrigerate any leftover food immediately  · Be aware when you camp or travel  Drink only clean water  Do not drink from rivers or lakes unless you purify or boil the water first  When you travel, drink bottled water and avoid ice  Do not eat fruit that has not been peeled  Avoid raw fish or meat that is not fully cooked  Follow up with your healthcare provider as directed:  Write down your questions so you remember to ask them during your visits  CARE AGREEMENT:   You have the right to help plan your care  Learn about your health condition and how it may be treated  Discuss treatment options with your caregivers to decide what care you want to receive  You always have the right to refuse treatment  The above information is an  only  It is not intended as medical advice for individual conditions or treatments  Talk to your doctor, nurse or pharmacist before following any medical regimen to see if it is safe and effective for you  © 2014 [address] is for End User's use only and may not be sold, redistributed or otherwise used for commercial purposes  All illustrations and images included in CareNotes® are the copyrighted property of A [employer]  or Tremayne Trevino

## 2018-07-20 NOTE — ED PROVIDER NOTES
History  Chief Complaint   Patient presents with    Vomiting     pt rpeorts ongoing sinus infection tx with augmentin, pt reports voming started yesterday, pt reports "feeling week and drained"  +n/v/d       History provided by:  Patient  Vomiting   Severity:  Moderate  Duration:  2 days  Timing:  Intermittent  Number of daily episodes:  3  Quality:  Stomach contents  Able to tolerate:  Liquids  Progression:  Unchanged  Chronicity:  New  Recent urination:  Normal  Context comment:  Earlier in the week had nasal congestion was prescribed Augmentin for presumed sinus infection, patient now with worsening abdominal pain diarrhea and vomiting  Relieved by:  Nothing  Worsened by:  Nothing  Associated symptoms: abdominal pain and diarrhea    Associated symptoms: no chills, no cough, no fever, no headaches and no sore throat    Abdominal pain:     Location:  Generalized    Quality: aching      Severity:  Moderate    Onset quality:  Gradual    Duration:  2 days    Timing:  Intermittent    Progression:  Waxing and waning    Chronicity:  New      Prior to Admission Medications   Prescriptions Last Dose Informant Patient Reported? Taking?    ALPRAZolam (XANAX PO)  Self Yes No   Sig: Take by mouth as needed   Sertraline HCl (ZOLOFT PO)  Self Yes No   Sig: Take by mouth daily   amoxicillin-clavulanate (AUGMENTIN) 875-125 mg per tablet   No No   Sig: Take 1 tablet by mouth every 12 (twelve) hours for 10 days   norethindrone-ethinyl estradiol (JUNEL FE 1/20) 1-20 MG-MCG per tablet  Self Yes No   Sig: Take 1 tablet by mouth daily   pantoprazole (PROTONIX) 40 mg tablet  Self Yes No   Sig: Take 40 mg by mouth daily   pantoprazole (PROTONIX) 40 mg tablet   No No   Sig: Take 1 tablet (40 mg total) by mouth daily   polyethylene glycol (GLYCOLAX) powder   No No   Sig: Take 17 g by mouth daily      Facility-Administered Medications: None       Past Medical History:   Diagnosis Date    Anemia     Anxiety     Asthma     Depression  Disease of thyroid gland     GERD (gastroesophageal reflux disease)        Past Surgical History:   Procedure Laterality Date    COLONOSCOPY  2015    THYROIDECTOMY, PARTIAL  2017       Family History   Problem Relation Age of Onset    Hypertension Father     Diabetes Maternal Grandfather     Breast cancer Paternal Grandmother     Cancer Paternal Grandmother         thyroid     I have reviewed and agree with the history as documented  Social History   Substance Use Topics    Smoking status: Never Smoker    Smokeless tobacco: Never Used    Alcohol use Yes      Comment: socially         Review of Systems   Constitutional: Negative for activity change, chills, diaphoresis and fever  HENT: Negative for congestion, sinus pressure and sore throat  Eyes: Negative for pain and visual disturbance  Respiratory: Negative for cough, chest tightness, shortness of breath, wheezing and stridor  Cardiovascular: Negative for chest pain and palpitations  Gastrointestinal: Positive for abdominal pain, diarrhea and vomiting  Negative for abdominal distention, constipation and nausea  Genitourinary: Negative for dysuria and frequency  Musculoskeletal: Negative for neck pain and neck stiffness  Skin: Negative for rash  Neurological: Negative for dizziness, speech difficulty, light-headedness, numbness and headaches  Physical Exam  Physical Exam   Constitutional: She is oriented to person, place, and time  She appears well-developed  No distress  HENT:   Head: Normocephalic and atraumatic  Eyes: Pupils are equal, round, and reactive to light  Neck: Normal range of motion  Neck supple  No tracheal deviation present  Cardiovascular: Normal rate, regular rhythm, normal heart sounds and intact distal pulses  No murmur heard  Pulmonary/Chest: Effort normal and breath sounds normal  No stridor  No respiratory distress  Abdominal: Soft  She exhibits no distension   There is tenderness (RLQ abd pain and tenderness)  There is no rebound and no guarding  Musculoskeletal: Normal range of motion  Neurological: She is alert and oriented to person, place, and time  Skin: Skin is warm and dry  She is not diaphoretic  No erythema  No pallor  Psychiatric: She has a normal mood and affect  Vitals reviewed        Vital Signs  ED Triage Vitals   Temperature Pulse Respirations Blood Pressure SpO2   07/20/18 0842 07/20/18 0842 07/20/18 0842 07/20/18 0842 07/20/18 0842   98 4 °F (36 9 °C) 75 18 135/95 99 %      Temp Source Heart Rate Source Patient Position - Orthostatic VS BP Location FiO2 (%)   07/20/18 0842 07/20/18 0842 07/20/18 0842 07/20/18 0842 --   Oral Monitor Lying Right arm       Pain Score       07/20/18 0855       8           Vitals:    07/20/18 0842 07/20/18 0845 07/20/18 1101   BP: 135/95  112/71   Pulse: 75 72 64   Patient Position - Orthostatic VS: Lying  Lying       Visual Acuity      ED Medications  Medications   sodium chloride 0 9 % bolus 1,000 mL (1,000 mL Intravenous New Bag 7/20/18 0923)   ondansetron (ZOFRAN) injection 4 mg (4 mg Intravenous Given 7/20/18 0924)   iohexol (OMNIPAQUE) 350 MG/ML injection (SINGLE-DOSE) 100 mL (100 mL Intravenous Given 7/20/18 1015)       Diagnostic Studies  Results Reviewed     Procedure Component Value Units Date/Time    POCT pregnancy, urine [[id number]]  (Normal) Resulted:  07/20/18 0957    Lab Status:  Final result Updated:  07/20/18 0957     EXT PREG TEST UR (Ref: Negative) negative    Urine Microscopic [[id number]]  (Abnormal) Collected:  07/20/18 0858    Lab Status:  Final result Specimen:  Urine from Urine, Clean Catch Updated:  07/20/18 0954     RBC, UA None Seen /hpf      WBC, UA 0-1 (A) /hpf      Epithelial Cells Occasional /hpf      Bacteria, UA Occasional /hpf      MUCOUS THREADS Occasional (A)    Basic metabolic panel [[id number]] Collected:  07/20/18 0919    Lab Status:  Final result Specimen:  Blood from Arm, Left Updated:  07/20/18 4239 Sodium 138 mmol/L      Potassium 3 7 mmol/L      Chloride 106 mmol/L      CO2 25 mmol/L      Anion Gap 7 mmol/L      BUN 12 mg/dL      Creatinine 0 85 mg/dL      Glucose 82 mg/dL      Calcium 8 7 mg/dL      eGFR 109 ml/min/1 73sq m     Narrative:         National Kidney Disease Education Program recommendations are as follows:  GFR calculation is accurate only with a steady state creatinine  Chronic Kidney disease less than 60 ml/min/1 73 sq  meters  Kidney failure less than 15 ml/min/1 73 sq  meters      Hepatic function panel [[id number]]  (Abnormal) Collected:  07/20/18 0919    Lab Status:  Final result Specimen:  Blood from Arm, Left Updated:  07/20/18 0951     Total Bilirubin 0 40 mg/dL      Bilirubin, Direct 0 07 mg/dL      Alkaline Phosphatase 87 U/L      AST 11 U/L      ALT 21 U/L      Total Protein 7 4 g/dL      Albumin 3 4 (L) g/dL     Lipase [[id number]]  (Normal) Collected:  07/20/18 0919    Lab Status:  Final result Specimen:  Blood from Arm, Left Updated:  07/20/18 0951     Lipase 103 u/L     UA w Reflex to Microscopic w Reflex to Culture [[id number]]  (Abnormal) Collected:  07/20/18 0858    Lab Status:  Final result Specimen:  Urine from Urine, Clean Catch Updated:  07/20/18 0933     Color, UA Yellow     Clarity, UA Clear     Specific Gravity, UA 1 025     pH, UA 5 5     Leukocytes, UA Negative     Nitrite, UA Negative     Protein, UA Negative mg/dl      Glucose, UA Negative mg/dl      Ketones, UA Negative mg/dl      Urobilinogen, UA 0 2 E U /dl      Bilirubin, UA Negative     Blood, UA Large (A)    CBC and differential [[id number]]  (Normal) Collected:  07/20/18 0919    Lab Status:  Final result Specimen:  Blood from Arm, Left Updated:  07/20/18 0928     WBC 7 41 Thousand/uL      RBC 4 75 Million/uL      Hemoglobin 13 8 g/dL      Hematocrit 39 5 %      MCV 83 fL      MCH 29 1 pg      MCHC 34 9 g/dL      RDW 12 6 %      MPV 9 8 fL      Platelets 905 Thousands/uL      Neutrophils Relative 64 %      Lymphocytes Relative 21 %      Monocytes Relative 8 %      Eosinophils Relative 6 %      Basophils Relative 1 %      Neutrophils Absolute 4 74 Thousands/µL      Lymphocytes Absolute 1 58 Thousands/µL      Monocytes Absolute 0 57 Thousand/µL      Eosinophils Absolute 0 47 Thousand/µL      Basophils Absolute 0 05 Thousands/µL                  CT abdomen pelvis with contrast   Final Result by John Mcconnell MD (07/20 1043)      No acute pathology  Workstation performed: [id number]                    Procedures  Procedures       Phone Contacts  ED Phone Contact    ED Course                               MDM  Number of Diagnoses or Management Options  Acute viral syndrome: new and requires workup  Viral gastroenteritis: new and requires workup  Diagnosis management comments:         Initial ED assessment:  60-year-old female, URI symptoms prescribed Augmentin, now with worsening Vomiting and diarrhea  With right lower quadrant localized tenderness on examination    Initial DDx includes but is not limited to:  Appendicitis mesenteric adenitis, viral syndrome  , doubt sinus infection as previously diagnosed by urgent care patient no longer with any congestion  I would not expect a sinus infection to clear up after 1 day of antibiotic use  Initial ED plan:  Blood work, CT imaging, disposition pending ED workup  Final ED summary/disposition:   After evaluation and workup in the emergency department, workup unremarkable, likely viral syndrome causing all this advised to stop taking Augmentin as I do not believe she has a sinus infection  , this likely is also worsening her diarrhea and vomiting , will prescribe Zofran for symptomatic control of nausea        Amount and/or Complexity of Data Reviewed  Clinical lab tests: ordered and reviewed  Tests in the radiology section of CPT®: ordered and reviewed  Review and summarize past medical records: yes  Independent visualization of images, tracings, or specimens: yes      CritCare Time    Disposition  Final diagnoses:   Acute viral syndrome   Viral gastroenteritis     Time reflects when diagnosis was documented in both MDM as applicable and the Disposition within this note     Time User Action Codes Description Comment    7/20/2018 11:02 AM Lisa Ford [B34 9] Acute viral syndrome     7/20/2018 11:02 AM Lisa Ford [A08 4] Viral gastroenteritis       ED Disposition     ED Disposition Condition Comment    Discharge  Celi Waller discharge to home/self care  Condition at discharge: Good        Follow-up Information    None         Patient's Medications   Discharge Prescriptions    ONDANSETRON (ZOFRAN-ODT) 4 MG DISINTEGRATING TABLET    Take 1 tablet (4 mg total) by mouth every 8 (eight) hours as needed for nausea or vomiting for up to 15 doses       Start Date: 7/20/2018 End Date: --       Order Dose: 4 mg       Quantity: 20 tablet    Refills: 0     No discharge procedures on file      ED Provider  Electronically Signed by           Alma Hernandez DO  07/20/18 1100

## 2018-07-30 ENCOUNTER — OFFICE VISIT (OUTPATIENT)
Dept: URGENT CARE | Age: 28
End: 2018-07-30
Payer: COMMERCIAL

## 2018-07-30 VITALS
HEART RATE: 78 BPM | DIASTOLIC BLOOD PRESSURE: 72 MMHG | WEIGHT: 198.6 LBS | SYSTOLIC BLOOD PRESSURE: 112 MMHG | TEMPERATURE: 97.9 F | RESPIRATION RATE: 16 BRPM | OXYGEN SATURATION: 99 % | BODY MASS INDEX: 34.09 KG/M2

## 2018-07-30 DIAGNOSIS — R30.0 DYSURIA: ICD-10-CM

## 2018-07-30 DIAGNOSIS — N30.01 ACUTE CYSTITIS WITH HEMATURIA: Primary | ICD-10-CM

## 2018-07-30 LAB
SL AMB  POCT GLUCOSE, UA: ABNORMAL
SL AMB LEUKOCYTE ESTERASE,UA: ABNORMAL
SL AMB POCT BILIRUBIN,UA: ABNORMAL
SL AMB POCT BLOOD,UA: ABNORMAL
SL AMB POCT CLARITY,UA: ABNORMAL
SL AMB POCT COLOR,UA: YELLOW
SL AMB POCT KETONES,UA: ABNORMAL
SL AMB POCT NITRITE,UA: ABNORMAL
SL AMB POCT PH,UA: 7
SL AMB POCT SPECIFIC GRAVITY,UA: 1.01
SL AMB POCT URINE PROTEIN: ABNORMAL
SL AMB POCT UROBILINOGEN: 0.2

## 2018-07-30 PROCEDURE — 87086 URINE CULTURE/COLONY COUNT: CPT | Performed by: PHYSICIAN ASSISTANT

## 2018-07-30 PROCEDURE — 81002 URINALYSIS NONAUTO W/O SCOPE: CPT | Performed by: FAMILY MEDICINE

## 2018-07-30 PROCEDURE — 87186 SC STD MICRODIL/AGAR DIL: CPT | Performed by: PHYSICIAN ASSISTANT

## 2018-07-30 PROCEDURE — 87077 CULTURE AEROBIC IDENTIFY: CPT | Performed by: PHYSICIAN ASSISTANT

## 2018-07-30 RX ORDER — SULFAMETHOXAZOLE AND TRIMETHOPRIM 800; 160 MG/1; MG/1
1 TABLET ORAL EVERY 12 HOURS SCHEDULED
Qty: 10 TABLET | Refills: 0 | Status: SHIPPED | OUTPATIENT
Start: 2018-07-30 | End: 2018-08-04

## 2018-07-30 NOTE — PATIENT INSTRUCTIONS
Take antibiotic as directed  Please take probiotics daily  Use back up birth control while on antibiotics  Increase fluid intake  Call us in 2-3 days for urine culture results  Recheck with PCP or gynecologist if no improvement in 3-5 days  ER if worsening symptoms develop (such as fevers, vomiting, upper back pain, etc)      Urinary Tract Infection in Women   WHAT YOU NEED TO KNOW:   A urinary tract infection (UTI) is caused by bacteria that get inside your urinary tract  Most bacteria that enter your urinary tract come out when you urinate  If the bacteria stay in your urinary tract, you may get an infection  Your urinary tract includes your kidneys, ureters, bladder, and urethra  Urine is made in your kidneys, and it flows from the ureters to the bladder  Urine leaves the bladder through the urethra  A UTI is more common in your lower urinary tract, which includes your bladder and urethra  DISCHARGE INSTRUCTIONS:   Return to the emergency department if:   · You are urinating very little or not at all  · You have a high fever with shaking chills  · You have side or back pain that gets worse  Contact your healthcare provider if:   · You have a fever  · You do not feel better after 2 days of taking antibiotics  · You are vomiting  · You have questions or concerns about your condition or care  Medicines:   · Antibiotics  help fight a bacterial infection  · Medicines  may be given to decrease pain and burning when you urinate  They will also help decrease the feeling that you need to urinate often  These medicines will make your urine orange or red  · Take your medicine as directed  Contact your healthcare provider if you think your medicine is not helping or if you have side effects  Tell him or her if you are allergic to any medicine  Keep a list of the medicines, vitamins, and herbs you take  Include the amounts, and when and why you take them   Bring the list or the pill bottles to follow-up visits  Carry your medicine list with you in case of an emergency  Follow up with your healthcare provider as directed:  Write down your questions so you remember to ask them during your visits  Prevent another UTI:   · Empty your bladder often  Urinate and empty your bladder as soon as you feel the need  Do not hold your urine for long periods of time  · Wipe from front to back after you urinate or have a bowel movement  This will help prevent germs from getting into your urinary tract through your urethra  · Drink liquids as directed  Ask how much liquid to drink each day and which liquids are best for you  You may need to drink more liquids than usual to help flush out the bacteria  Do not drink alcohol, caffeine, or citrus juices  These can irritate your bladder and increase your symptoms  Your healthcare provider may recommend cranberry juice to help prevent a UTI  · Urinate after you have sex  This can help flush out bacteria passed during sex  · Do not douche or use feminine deodorants  These can change the chemical balance in your vagina  · Change sanitary pads or tampons often  This will help prevent germs from getting into your urinary tract  · Do pelvic muscle exercises often  Pelvic muscle exercises may help you start and stop urinating  Strong pelvic muscles may help you empty your bladder easier  Squeeze these muscles tightly for 5 seconds like you are trying to hold back urine  Then relax for 5 seconds  Gradually work up to squeezing for 10 seconds  Do 3 sets of 15 repetitions a day, or as directed  © 2017 [employer] Information is for End User's use only and may not be sold, redistributed or otherwise used for commercial purposes  All illustrations and images included in CareNotes® are the copyrighted property of [employer]  or Tremayne Trevino  The above information is an  only   It is not intended as medical advice for individual conditions or treatments  Talk to your doctor, nurse or pharmacist before following any medical regimen to see if it is safe and effective for you

## 2018-07-30 NOTE — PROGRESS NOTES
[facility]        NAME: Cassandra Israel is a 33 y o  female  : 1990    MRN: [id number]  DATE: 2018  TIME: 6:22 PM    Assessment and Plan   Acute cystitis with hematuria [N30 01]  1  Acute cystitis with hematuria  sulfamethoxazole-trimethoprim (BACTRIM DS) 800-160 mg per tablet    Urine culture   2  Dysuria  POCT urine dip       Urine dip+ blood and leukocytes  Patient Instructions     Take antibiotic as directed  Please take probiotics daily  Use back up birth control while on antibiotics  Increase fluid intake  Call us in 2-3 days for urine culture results  Recheck with PCP or gynecologist if no improvement in 3-5 days  ER if worsening symptoms develop (such as fevers, vomiting, upper back pain, etc)    Chief Complaint     Chief Complaint   Patient presents with    Possible UTI     started yesterday, urgency and burning         History of Present Illness       34 y/o female with urinary frequency and burning since last night  + suprapubic abdominal pain  No vomiting or flank pain  + slight nausea  No new sexual partners  + recent intercourse  No vag d/c  Noted small amount hematuria when wiping  LNMP 18  Review of Systems   Review of Systems   Constitutional: Negative  Respiratory: Negative  Cardiovascular: Negative  Genitourinary: Positive for dysuria and frequency  Negative for flank pain, menstrual problem and vaginal discharge  All other systems reviewed and are negative          Current Medications       Current Outpatient Prescriptions:     ALPRAZolam (XANAX PO), Take by mouth as needed, Disp: , Rfl:     norethindrone-ethinyl estradiol (JUNEL FE 1/20) 1-20 MG-MCG per tablet, Take 1 tablet by mouth daily, Disp: , Rfl:     ondansetron (ZOFRAN-ODT) 4 mg disintegrating tablet, Take 1 tablet (4 mg total) by mouth every 8 (eight) hours as needed for nausea or vomiting for up to 15 doses, Disp: 20 tablet, Rfl: 0    pantoprazole (PROTONIX) 40 mg tablet, Take 40 mg by mouth daily, Disp: , Rfl:     pantoprazole (PROTONIX) 40 mg tablet, Take 1 tablet (40 mg total) by mouth daily, Disp: 30 tablet, Rfl: 5    polyethylene glycol (GLYCOLAX) powder, Take 17 g by mouth daily, Disp: 500 g, Rfl: 1    Sertraline HCl (ZOLOFT PO), Take by mouth daily, Disp: , Rfl:     sulfamethoxazole-trimethoprim (BACTRIM DS) 800-160 mg per tablet, Take 1 tablet by mouth every 12 (twelve) hours for 5 days, Disp: 10 tablet, Rfl: 0    Current Allergies     Allergies as of 07/30/2018 - Reviewed 07/30/2018   Allergen Reaction Noted    Pollen extract Itching 04/25/2017            The following portions of the patient's history were reviewed and updated as appropriate: allergies, current medications, past family history, past medical history, past social history, past surgical history and problem list    Past Medical History:   Diagnosis Date    Anemia     Anxiety     Asthma     Depression     Disease of thyroid gland     GERD (gastroesophageal reflux disease)      Past Surgical History:   Procedure Laterality Date    COLONOSCOPY  2015    THYROIDECTOMY, PARTIAL  2017           Objective   /72   Pulse 78   Temp 97 9 °F (36 6 °C)   Resp 16   Wt 90 1 kg (198 lb 9 6 oz)   LMP 07/16/2018   SpO2 99%   BMI 34 09 kg/m²        Physical Exam     Physical Exam   Constitutional: She is oriented to person, place, and time  She appears well-developed and well-nourished  Cardiovascular: Normal rate, regular rhythm and normal heart sounds  Pulmonary/Chest: Effort normal and breath sounds normal    Abdominal: Soft  Normal appearance and bowel sounds are normal  There is tenderness in the suprapubic area  There is no rigidity, no guarding and no CVA tenderness  Neurological: She is alert and oriented to person, place, and time  Psychiatric: She has a normal mood and affect  Her behavior is normal    Nursing note and vitals reviewed

## 2018-08-01 LAB
BACTERIA UR CULT: ABNORMAL
BACTERIA UR CULT: ABNORMAL

## 2019-03-12 ENCOUNTER — OFFICE VISIT (OUTPATIENT)
Dept: URGENT CARE | Age: 29
End: 2019-03-12
Payer: COMMERCIAL

## 2019-03-12 VITALS
RESPIRATION RATE: 18 BRPM | SYSTOLIC BLOOD PRESSURE: 109 MMHG | DIASTOLIC BLOOD PRESSURE: 74 MMHG | TEMPERATURE: 97.6 F | HEART RATE: 79 BPM | OXYGEN SATURATION: 99 %

## 2019-03-12 DIAGNOSIS — B34.9 VIRAL INFECTION: ICD-10-CM

## 2019-03-12 DIAGNOSIS — J06.9 UPPER RESPIRATORY TRACT INFECTION, UNSPECIFIED TYPE: Primary | ICD-10-CM

## 2019-03-12 DIAGNOSIS — R11.0 NAUSEA: ICD-10-CM

## 2019-03-12 PROCEDURE — 99213 OFFICE O/P EST LOW 20 MIN: CPT | Performed by: FAMILY MEDICINE

## 2019-03-12 RX ORDER — ONDANSETRON 4 MG/1
4 TABLET, ORALLY DISINTEGRATING ORAL EVERY 6 HOURS PRN
Qty: 20 TABLET | Refills: 0 | Status: SHIPPED | OUTPATIENT
Start: 2019-03-12 | End: 2019-04-16 | Stop reason: SDUPTHER

## 2019-03-12 NOTE — LETTER
March 12, 2019     Patient: Daksha Kent   YOB: 1990   Date of Visit: 3/12/2019       To Whom It May Concern:    Patient seen in office today for acute illness  No work until without fever for 24 hours without having to take anti fever medication       Sincerely,        Queen Santi PA-C    CC: No Recipients

## 2019-03-13 NOTE — PROGRESS NOTES
[facility]    NAME: Deb Diaz is a 33 y o  female  : 1990    MRN: [id number]  DATE: 2019  TIME: 9:44 PM    Assessment and Plan   Upper respiratory tract infection, unspecified type [J06 9]  1  Upper respiratory tract infection, unspecified type     2  Viral infection     3  Nausea  ondansetron (ZOFRAN-ODT) 4 mg disintegrating tablet       Patient Instructions     Patient Instructions     This is a viral illness and no antibiotic is indicated at this time  Tylenol or ibuprofen as needed for sore throat, fever, body aches and pains  Cough drops, throat lozenges as needed  Vaporizer  You may use over the counter cough / cold medication as directed  This provider likes Mucinex D 1/2 to 1 tablet twice a day with full glass of fluid for daytime symptom relief (unless on blood pressure medicine, then we recommend Coricidin HP)  Nasal saline spray may be helpful  May use nighttime cough medicine  May cause drowsiness and recommend home use only  Push fluids  Rest     No work or outside activities if fever or chills  Recommend fever gone for a good 24 hours without having to take anti fever medicine before returning to work  If you have a fever, it  typically lasts  approximately 3-5 days  Nasal drainage, congestions and / or cough typically peak around 8-10 days and then slowly resolve over next few weeks  Yellow or green mucous does not necessarily mean a bacterial infection  If you develop persisting chest pain with shortness of breath seek further attention at ER  Chief Complaint     Chief Complaint   Patient presents with    Nausea     pt reports since  nausea, HA, bodyaches and intermittent fever  History of Present Illness   Adrienne Basilio presents to the clinic c/o  27-year-old female started feeling sick on  with a low-grade fever and cough  She has had some chills  She took Monday off from work    Then she has had some nausea and headache as well as feeling a little bit lightheaded along with nasal congestion drainage in cough  She has done a lot of sneezing  She has been taking NyQuil and DayQuil as well as Motrin and Pepto-Bismol with little bit of relief  Boyfriend and has been sick all weekend with similar illness but seems to be sicker  Review of Systems   Review of Systems   Constitutional: Positive for activity change, appetite change, chills, fatigue and fever  HENT: Positive for congestion, postnasal drip, rhinorrhea and sinus pressure  Negative for sore throat  Eyes: Negative  Respiratory: Positive for cough  Negative for chest tightness, shortness of breath and wheezing  Cardiovascular: Negative  Gastrointestinal: Positive for nausea  Negative for diarrhea and vomiting  Neurological: Positive for light-headedness and headaches  Hematological: Negative          Current Medications     Long-Term Medications   Medication Sig Dispense Refill    norethindrone-ethinyl estradiol (JUNEL FE 1/20) 1-20 MG-MCG per tablet Take 1 tablet by mouth daily      pantoprazole (PROTONIX) 40 mg tablet Take 40 mg by mouth daily      ondansetron (ZOFRAN-ODT) 4 mg disintegrating tablet Take 1 tablet (4 mg total) by mouth every 8 (eight) hours as needed for nausea or vomiting for up to 15 doses (Patient not taking: Reported on 3/12/2019) 20 tablet 0    ondansetron (ZOFRAN-ODT) 4 mg disintegrating tablet Take 1 tablet (4 mg total) by mouth every 6 (six) hours as needed for nausea or vomiting 20 tablet 0    pantoprazole (PROTONIX) 40 mg tablet Take 1 tablet (40 mg total) by mouth daily (Patient not taking: Reported on 3/12/2019) 30 tablet 5    polyethylene glycol (GLYCOLAX) powder Take 17 g by mouth daily (Patient not taking: Reported on 3/12/2019) 500 g 1       Current Allergies     Allergies as of 03/12/2019 - Reviewed 03/12/2019   Allergen Reaction Noted    Bee pollen Itching 04/25/2017    Pollen extract Itching 04/25/2017 The following portions of the patient's history were reviewed and updated as appropriate: allergies, current medications, past family history, past medical history, past social history, past surgical history and problem list   Past Medical History:   Diagnosis Date    Anemia     Anxiety     Asthma     Depression     Disease of thyroid gland     GERD (gastroesophageal reflux disease)      Past Surgical History:   Procedure Laterality Date    COLONOSCOPY  2015    THYROIDECTOMY, PARTIAL  2017     Family History   Problem Relation Age of Onset    Hypertension Father     Diabetes Maternal Grandfather     Breast cancer Paternal Grandmother     Cancer Paternal Grandmother         thyroid       Objective   /74 (BP Location: Left arm, Patient Position: Sitting)   Pulse 79   Temp 97 6 °F (36 4 °C) (Temporal)   Resp 18   LMP 03/01/2019   SpO2 99%   Patient's last menstrual period was 03/01/2019  Physical Exam     Physical Exam   Constitutional: She is oriented to person, place, and time  She appears well-developed and well-nourished  No distress  HENT:   Head: Normocephalic and atraumatic  Right Ear: External ear normal    Left Ear: External ear normal    Nose: Nose normal    Mouth/Throat: Oropharynx is clear and moist  No oropharyngeal exudate  Eyes: Pupils are equal, round, and reactive to light  Conjunctivae are normal  Right eye exhibits no discharge  Left eye exhibits no discharge  No scleral icterus  Neck: Normal range of motion  Neck supple  Cardiovascular: Normal rate, regular rhythm and normal heart sounds  Exam reveals no gallop and no friction rub  No murmur heard  Pulmonary/Chest: Effort normal and breath sounds normal  No stridor  No respiratory distress  She has no wheezes  She has no rales  Abdominal: Soft  There is no tenderness  Lymphadenopathy:     She has no cervical adenopathy  Neurological: She is alert and oriented to person, place, and time     Skin: Skin is warm and dry  No rash noted  She is not diaphoretic  Psychiatric: She has a normal mood and affect  Nursing note and vitals reviewed

## 2019-03-13 NOTE — PATIENT INSTRUCTIONS
This is a viral illness and no antibiotic is indicated at this time  Tylenol or ibuprofen as needed for sore throat, fever, body aches and pains  Cough drops, throat lozenges as needed  Vaporizer  You may use over the counter cough / cold medication as directed  This provider likes Mucinex D 1/2 to 1 tablet twice a day with full glass of fluid for daytime symptom relief (unless on blood pressure medicine, then we recommend Coricidin HP)  Nasal saline spray may be helpful  May use nighttime cough medicine  May cause drowsiness and recommend home use only  Push fluids  Rest     No work or outside activities if fever or chills  Recommend fever gone for a good 24 hours without having to take anti fever medicine before returning to work  If you have a fever, it  typically lasts  approximately 3-5 days  Nasal drainage, congestions and / or cough typically peak around 8-10 days and then slowly resolve over next few weeks  Yellow or green mucous does not necessarily mean a bacterial infection  If you develop persisting chest pain with shortness of breath seek further attention at ER

## 2019-04-16 ENCOUNTER — OFFICE VISIT (OUTPATIENT)
Dept: URGENT CARE | Age: 29
End: 2019-04-16
Payer: COMMERCIAL

## 2019-04-16 VITALS
WEIGHT: 190 LBS | RESPIRATION RATE: 20 BRPM | HEART RATE: 83 BPM | BODY MASS INDEX: 32.44 KG/M2 | DIASTOLIC BLOOD PRESSURE: 85 MMHG | HEIGHT: 64 IN | OXYGEN SATURATION: 99 % | TEMPERATURE: 98.9 F | SYSTOLIC BLOOD PRESSURE: 120 MMHG

## 2019-04-16 DIAGNOSIS — J01.00 ACUTE MAXILLARY SINUSITIS, RECURRENCE NOT SPECIFIED: Primary | ICD-10-CM

## 2019-04-16 PROCEDURE — 99213 OFFICE O/P EST LOW 20 MIN: CPT | Performed by: FAMILY MEDICINE

## 2019-04-16 RX ORDER — AMOXICILLIN AND CLAVULANATE POTASSIUM 875; 125 MG/1; MG/1
1 TABLET, FILM COATED ORAL EVERY 12 HOURS SCHEDULED
Qty: 14 TABLET | Refills: 0 | Status: SHIPPED | OUTPATIENT
Start: 2019-04-16 | End: 2019-04-23

## 2019-04-16 RX ORDER — FAMOTIDINE 20 MG/1
20 TABLET, FILM COATED ORAL 2 TIMES DAILY
COMMUNITY
End: 2021-03-05

## 2019-08-09 ENCOUNTER — OFFICE VISIT (OUTPATIENT)
Dept: URGENT CARE | Age: 29
End: 2019-08-09
Payer: COMMERCIAL

## 2019-08-09 VITALS
WEIGHT: 197 LBS | TEMPERATURE: 97.5 F | DIASTOLIC BLOOD PRESSURE: 68 MMHG | RESPIRATION RATE: 19 BRPM | SYSTOLIC BLOOD PRESSURE: 112 MMHG | BODY MASS INDEX: 33.63 KG/M2 | OXYGEN SATURATION: 99 % | HEIGHT: 64 IN | HEART RATE: 77 BPM

## 2019-08-09 DIAGNOSIS — J01.40 ACUTE NON-RECURRENT PANSINUSITIS: ICD-10-CM

## 2019-08-09 DIAGNOSIS — J06.9 ACUTE UPPER RESPIRATORY INFECTION: Primary | ICD-10-CM

## 2019-08-09 PROCEDURE — 99213 OFFICE O/P EST LOW 20 MIN: CPT | Performed by: FAMILY MEDICINE

## 2019-08-09 RX ORDER — BUSPIRONE HYDROCHLORIDE 5 MG/1
5 TABLET ORAL 3 TIMES DAILY
COMMUNITY

## 2019-08-09 RX ORDER — DOXYCYCLINE 100 MG/1
100 TABLET ORAL 2 TIMES DAILY
Qty: 20 TABLET | Refills: 0 | Status: SHIPPED | OUTPATIENT
Start: 2019-08-09 | End: 2019-08-19

## 2019-08-09 NOTE — PATIENT INSTRUCTIONS
Options discussed with patient  Doxycycline twice a day until finished (please take probiotics)  Cold/cough/sinus medication as discussed (try Mucinex DM, and Flonase nasal spray 1 spray in each nostril once or twice a day)  Tylenol, or ibuprofen (Advil/Motrin), or try Aleve (please take with food) as needed  Gargle and swish mouth with warm saltwater or mouthwash  Please be careful of sun exposure while taking doxycycline  Recheck/follow-up with family physician as needed  Please go to the hospital emergency department if needed

## 2019-08-09 NOTE — PROGRESS NOTES
[facility]        NAME: Scar Myers is a 33 y o  female  : 1990    MRN: [id number]  DATE: 2019  TIME: 12:40 PM    Assessment and Plan   Acute upper respiratory infection [J06 9]  1  Acute upper respiratory infection     2  Acute non-recurrent pansinusitis  doxycycline (ADOXA) 100 MG tablet         Patient Instructions     Patient Instructions   Options discussed with patient  Doxycycline twice a day until finished (please take probiotics)  Cold/cough/sinus medication as discussed (try Mucinex DM, and Flonase nasal spray 1 spray in each nostril once or twice a day)  Tylenol, or ibuprofen (Advil/Motrin), or try Aleve (please take with food) as needed  Gargle and swish mouth with warm saltwater or mouthwash  Please be careful of sun exposure while taking doxycycline  Recheck/follow-up with family physician as needed  Please go to the hospital emergency department if needed  Follow up with PCP in 3-5 days  Proceed to  ER if symptoms worsen  Chief Complaint     Chief Complaint   Patient presents with    URI     Wednesday night was nausea, throat hurting, sinus pressure/pain, congested, mucus yellow/green, nose stuffed, ears clogged and hot/cold  Taken motrin, pepto and used nasal spray  History of Present Illness       Congestion with thick green mucus, cough, sinus pressure, ear discomfort, sore throat; patient states she is unable to take Augmentin      Review of Systems   Review of Systems   HENT: Positive for congestion, ear pain, sinus pressure and sore throat  Respiratory: Positive for cough  Cardiovascular: Negative  Musculoskeletal: Negative  Skin: Negative  Neurological: Negative            Current Medications       Current Outpatient Medications:     ALPRAZolam (XANAX PO), Take by mouth as needed, Disp: , Rfl:     busPIRone (BUSPAR) 5 mg tablet, Take 5 mg by mouth 3 (three) times a day, Disp: , Rfl:     famotidine (PEPCID) 20 mg tablet, Take 20 mg by mouth 2 (two) times a day, Disp: , Rfl:     norethindrone-ethinyl estradiol (JUNEL FE 1/20) 1-20 MG-MCG per tablet, Take 1 tablet by mouth daily, Disp: , Rfl:     pantoprazole (PROTONIX) 40 mg tablet, Take 40 mg by mouth daily, Disp: , Rfl:     Sertraline HCl (ZOLOFT PO), Take by mouth daily, Disp: , Rfl:     doxycycline (ADOXA) 100 MG tablet, Take 1 tablet (100 mg total) by mouth 2 (two) times a day for 20 doses, Disp: 20 tablet, Rfl: 0    ondansetron (ZOFRAN-ODT) 4 mg disintegrating tablet, Take 1 tablet (4 mg total) by mouth every 8 (eight) hours as needed for nausea or vomiting for up to 15 doses (Patient not taking: Reported on 8/9/2019), Disp: 20 tablet, Rfl: 0    polyethylene glycol (GLYCOLAX) powder, Take 17 g by mouth daily (Patient not taking: Reported on 3/12/2019), Disp: 500 g, Rfl: 1    Current Allergies     Allergies as of 08/09/2019 - Reviewed 08/09/2019   Allergen Reaction Noted    Augmentin [amoxicillin-pot clavulanate] Vomiting 08/09/2019    Bee pollen Itching 04/25/2017    Pollen extract Itching 04/25/2017            The following portions of the patient's history were reviewed and updated as appropriate: allergies, current medications, past family history, past medical history, past social history, past surgical history and problem list      Past Medical History:   Diagnosis Date    Anemia     Anxiety     Asthma     Depression     Disease of thyroid gland     GERD (gastroesophageal reflux disease)        Past Surgical History:   Procedure Laterality Date    COLONOSCOPY  2015    THYROIDECTOMY, PARTIAL  2017       Family History   Problem Relation Age of Onset    Hypertension Father     Diabetes Maternal Grandfather     Breast cancer Paternal Grandmother     Cancer Paternal Grandmother         thyroid         Medications have been verified          Objective   /68 (BP Location: Right arm, Patient Position: Sitting)   Pulse 77   Temp 97 5 °F (36 4 °C) (Temporal)   Resp 19   Ht 5' 4" (1 626 m)   Wt 89 4 kg (197 lb)   SpO2 99%   BMI 33 81 kg/m²        Physical Exam     Physical Exam   Constitutional: She is oriented to person, place, and time  She appears well-developed and well-nourished  HENT:   Right Ear: External ear normal    Left Ear: External ear normal    Nasal congestion with purulence mucus; injection, slight erythema of the oropharynx   Neck: Normal range of motion  Neck supple  Cardiovascular: Normal rate, regular rhythm and normal heart sounds  Pulmonary/Chest: Effort normal and breath sounds normal    Neurological: She is alert and oriented to person, place, and time  No nuchal rigidity   Skin:   Good color and turgor   Psychiatric: She has a normal mood and affect  Her behavior is normal    Nursing note and vitals reviewed

## 2019-11-15 ENCOUNTER — OFFICE VISIT (OUTPATIENT)
Dept: URGENT CARE | Facility: CLINIC | Age: 29
End: 2019-11-15
Payer: COMMERCIAL

## 2019-11-15 VITALS
DIASTOLIC BLOOD PRESSURE: 74 MMHG | BODY MASS INDEX: 34.49 KG/M2 | OXYGEN SATURATION: 96 % | SYSTOLIC BLOOD PRESSURE: 118 MMHG | HEIGHT: 64 IN | RESPIRATION RATE: 18 BRPM | TEMPERATURE: 100.5 F | HEART RATE: 80 BPM | WEIGHT: 202 LBS

## 2019-11-15 DIAGNOSIS — J06.9 ACUTE URI: Primary | ICD-10-CM

## 2019-11-15 DIAGNOSIS — R11.0 NAUSEA: ICD-10-CM

## 2019-11-15 PROCEDURE — 99213 OFFICE O/P EST LOW 20 MIN: CPT

## 2019-11-15 RX ORDER — ONDANSETRON 4 MG/1
4 TABLET, ORALLY DISINTEGRATING ORAL EVERY 8 HOURS PRN
Qty: 15 TABLET | Refills: 0 | Status: SHIPPED | OUTPATIENT
Start: 2019-11-15 | End: 2019-11-20

## 2019-11-15 NOTE — PROGRESS NOTES
Assessment/Plan    Acute URI [J06 9]  1  Acute URI     2  Nausea  ondansetron (ZOFRAN-ODT) 4 mg disintegrating tablet         Subjective:     Patient ID: Carey Merino is a 33 y o  female  Reason For Visit / Chief Complaint  Chief Complaint   Patient presents with    Cold Like Symptoms     head congestion, post-nasal drip, ears feel "full " States prone to sinus infections  Sx began yesterday  79-year-old female presents the clinic with nasal congestion, rhinorrhea, popping in her ears, sore throat, cough that started yesterday  Patient came with a friend who is being seen for a physical and decided to be checked as well  Patient has not taken anything for symptom relief at this time  Patient states she has had some nausea due to postnasal drip  Past Medical History:   Diagnosis Date    Anemia     Anxiety     Asthma     Depression     Disease of thyroid gland     GERD (gastroesophageal reflux disease)     Varicella        Past Surgical History:   Procedure Laterality Date    COLONOSCOPY  2015    THYROIDECTOMY, PARTIAL  2017       Family History   Problem Relation Age of Onset    Hypertension Father     Arthritis Father     Anxiety disorder Father     Hyperlipidemia Father     Diabetes Maternal Grandfather     Breast cancer Paternal Grandmother     Cancer Paternal Grandmother         thyroid    Anxiety disorder Mother     Diabetes Mother     Depression Mother     Fibroids Mother     Infertility Mother        Review of Systems   Constitutional: Negative for chills and fever  HENT: Positive for congestion, ear pain, postnasal drip, rhinorrhea and sore throat  Respiratory: Positive for cough  Gastrointestinal: Negative for abdominal pain, diarrhea, nausea and vomiting  Musculoskeletal: Negative for myalgias  Neurological: Negative for dizziness, light-headedness and headaches         Objective:    /74   Pulse 80   Temp 100 5 °F (38 1 °C)   Resp 18   Ht 5' 4" (1 626 m)   Wt 91 6 kg (202 lb)   LMP 11/02/2019   SpO2 96%   BMI 34 67 kg/m²     Physical Exam   Constitutional: She is oriented to person, place, and time  She appears well-developed and well-nourished  No distress  HENT:   Head: Normocephalic and atraumatic  Right Ear: Tympanic membrane is not erythematous  A middle ear effusion is present  Left Ear: Tympanic membrane normal    Nose: Mucosal edema and rhinorrhea present  Mouth/Throat: Uvula is midline and mucous membranes are normal  Posterior oropharyngeal erythema (PND) present  Cardiovascular: Normal rate and regular rhythm  Pulmonary/Chest: Effort normal and breath sounds normal    Musculoskeletal: Normal range of motion  Neurological: She is alert and oriented to person, place, and time  Skin: Skin is warm and dry  No rash noted  She is not diaphoretic  Nursing note and vitals reviewed

## 2019-11-15 NOTE — PATIENT INSTRUCTIONS
Upper Respiratory Infection   AMBULATORY CARE:   An upper respiratory infection  is also called a common cold  It can affect your nose, throat, ears, and sinuses  Common signs and symptoms include the following:  Cold symptoms are usually worst for the first 3 to 5 days  You may have any of the following:  · Runny or stuffy nose    · Sneezing and coughing    · Sore throat or hoarseness    · Red, watery, and sore eyes    · Fatigue     · Chills and fever    · Headache, body aches, or sore muscles  Seek care immediately if:   · You have chest pain or trouble breathing  Contact your healthcare provider if:   · You have a fever over 102ºF (39°C)  · Your sore throat gets worse or you see white or yellow spots in your throat  · Your symptoms get worse after 3 to 5 days or your cold is not better in 14 days  · You have a rash anywhere on your skin  · You have large, tender lumps in your neck  · You have thick, green or yellow drainage from your nose  · You cough up thick yellow, green, or bloody mucus  · You have vomiting for more than 24 hours and cannot keep fluids down  · You have a bad earache  · You have questions or concerns about your condition or care  Treatment for a cold: There is no cure for the common cold  Colds are caused by viruses and do not get better with antibiotics  Most people get better in 7 to 14 days  You may continue to cough for 2 to 3 weeks  The following may help decrease your symptoms:  · Decongestants  help reduce nasal congestion and help you breathe more easily  If you take decongestant pills, they may make you feel restless or not able to sleep  Do not use decongestant sprays for more than a few days  · Cough suppressants  help reduce coughing  Ask your healthcare provider which type of cough medicine is best for you  · NSAIDs , such as ibuprofen, help decrease swelling, pain, and fever   NSAIDs can cause stomach bleeding or kidney problems in certain people  If you take blood thinner medicine, always ask your healthcare provider if NSAIDs are safe for you  Always read the medicine label and follow directions  · Acetaminophen  decreases pain and fever  It is available without a doctor's order  Ask how much to take and how often to take it  Follow directions  Read the labels of all other medicines you are using to see if they also contain acetaminophen, or ask your doctor or pharmacist  Acetaminophen can cause liver damage if not taken correctly  Do not use more than 4 grams (4,000 milligrams) total of acetaminophen in one day  Manage your cold:   · Rest as much as possible  Slowly start to do more each day  · Drink more liquids as directed  Liquids will help thin and loosen mucus so you can cough it up  Liquids will also help prevent dehydration  Liquids that help prevent dehydration include water, fruit juice, and broth  Do not drink liquids that contain caffeine  Caffeine can increase your risk for dehydration  Ask your healthcare provider how much liquid to drink each day  · Soothe a sore throat  Gargle with warm salt water  This helps your sore throat feel better  Make salt water by dissolving ¼ teaspoon salt in 1 cup warm water  You may also suck on hard candy or throat lozenges  You may use a sore throat spray  · Use a humidifier or vaporizer  Use a cool mist humidifier or a vaporizer to increase air moisture in your home  This may make it easier for you to breathe and help decrease your cough  · Use saline nasal drops as directed  These help relieve congestion  · Apply petroleum-based jelly around the outside of your nostrils  This can decrease irritation from blowing your nose  · Do not smoke  Nicotine and other chemicals in cigarettes and cigars can make your symptoms worse  They can also cause infections such as bronchitis or pneumonia   Ask your healthcare provider for information if you currently smoke and need help to quit  E-cigarettes or smokeless tobacco still contain nicotine  Talk to your healthcare provider before you use these products  Prevent spreading your cold to others:   · Try to stay away from other people during the first 2 to 3 days of your cold when it is more easily spread  · Do not share food or drinks  · Do not share hand towels with household members  · Wash your hands often, especially after you blow your nose  Turn away from other people and cover your mouth and nose with a tissue when you sneeze or cough  Follow up with your healthcare provider as directed:  Write down your questions so you remember to ask them during your visits  © 2017 [facility] Information is for End User's use only and may not be sold, redistributed or otherwise used for commercial purposes  All illustrations and images included in CareNotes® are the copyrighted property of [employer]  or Tremayne Trevino  The above information is an  only  It is not intended as medical advice for individual conditions or treatments  Talk to your doctor, nurse or pharmacist before following any medical regimen to see if it is safe and effective for you

## 2019-11-19 ENCOUNTER — OFFICE VISIT (OUTPATIENT)
Dept: OBGYN CLINIC | Facility: CLINIC | Age: 29
End: 2019-11-19
Payer: COMMERCIAL

## 2019-11-19 VITALS — SYSTOLIC BLOOD PRESSURE: 126 MMHG | DIASTOLIC BLOOD PRESSURE: 80 MMHG | WEIGHT: 203.8 LBS | BODY MASS INDEX: 34.98 KG/M2

## 2019-11-19 DIAGNOSIS — Z11.3 SCREENING FOR STDS (SEXUALLY TRANSMITTED DISEASES): ICD-10-CM

## 2019-11-19 DIAGNOSIS — E06.3 HASHIMOTO'S THYROIDITIS: ICD-10-CM

## 2019-11-19 DIAGNOSIS — Z01.419 ENCOUNTER FOR GYNECOLOGICAL EXAMINATION: Primary | ICD-10-CM

## 2019-11-19 DIAGNOSIS — D57.3 SICKLE CELL TRAIT (HCC): ICD-10-CM

## 2019-11-19 PROCEDURE — 87491 CHLMYD TRACH DNA AMP PROBE: CPT | Performed by: STUDENT IN AN ORGANIZED HEALTH CARE EDUCATION/TRAINING PROGRAM

## 2019-11-19 PROCEDURE — 87591 N.GONORRHOEAE DNA AMP PROB: CPT | Performed by: STUDENT IN AN ORGANIZED HEALTH CARE EDUCATION/TRAINING PROGRAM

## 2019-11-19 PROCEDURE — G0145 SCR C/V CYTO,THINLAYER,RESCR: HCPCS | Performed by: STUDENT IN AN ORGANIZED HEALTH CARE EDUCATION/TRAINING PROGRAM

## 2019-11-19 PROCEDURE — S0610 ANNUAL GYNECOLOGICAL EXAMINA: HCPCS | Performed by: STUDENT IN AN ORGANIZED HEALTH CARE EDUCATION/TRAINING PROGRAM

## 2019-11-20 LAB
C TRACH DNA SPEC QL NAA+PROBE: NEGATIVE
N GONORRHOEA DNA SPEC QL NAA+PROBE: NEGATIVE

## 2019-11-20 NOTE — PROGRESS NOTES
New patient annual exam - Gynecology   Tuckergiovanni Chely Waller 33 y o  female MRN: [id number]  227 M  [facility]  Encounter: [id number]    Chief Complaint   Patient presents with    Gynecologic Exam     yearly gyn exam last pap unknown ( years Ago)        History of Present Illness     Ilan Aldana is a 33 y o  female  Patient's last menstrual period was 2019  premenopausal, Kimo Tirado for contraception who presents for [facility] care, annual exam     Concerns today: several   1  Would like to discuss preconception counseling regarding her Hashimoto's thyroiditis as well as her sickle cell trait (boyfriend also with sickle cell trait)  2  Reports family history of breast cancer - paternal grandmother, maternal great aunt, maternal cousin twice removed  REVIEW OF SYSTEMS  CONSTITUTIONAL:  No weight loss, fever, chills, weakness  HEENT: No visual loss, blurred vision  SKIN: No rash or itching  CARDIOVASCULAR: No chest pain, chest pressure, or chest discomfort  RESPIRATORY: No shortness of breath, cough or sputum  GASTROINTESTINAL: No nausea, emesis, or diarrhea  NEUROLOGICAL: No headache, dizziness, syncope, paralysis  MUSCUOSKELETAL: No muscle, back pain, joint stiffness or bruising  INFECTIOUS: No fever, chills  PSYCHIATRIC: No disorder of thought or mood  HEMATOLOGIC: No easy bruising or bleeding  GYN: No abnormal bleeding  No involuntary urine loss  No pain with intercourse   No vaginal dryness    Past Medical History:   Diagnosis Date    Anemia     Anxiety     Asthma     Depression     Disease of thyroid gland     GERD (gastroesophageal reflux disease)     Varicella        Patient Active Problem List   Diagnosis    Anxiety    Depression    Hashimoto's thyroiditis    S/P partial thyroidectomy    Thyroid nodule       Past Surgical History:   Procedure Laterality Date    COLONOSCOPY      THYROIDECTOMY, PARTIAL  2017       OB History        1 Para   1    Term                AB        Living           SAB        TAB        Ectopic        Multiple        Live Births                     #: 1, Date: None, Sex: None, Weight: None, GA: None, Delivery: None, Apgar1: None, Apgar5: None, Living: None, Birth Comments: None         Period Pattern: Regular  Menstrual Flow: Moderate     GYN History  Menarche age 7  LMP 2019  Frequency q28 lasting 7 days  Regular periods  No history abnormal Pap smears  No history of cryotherapy, LEEP, or cold knife cone of the cervix    Health maintenance  Last pap smear:  uncertain prior  Bone density scan: n/a  Last mammogram: Not on file n/a  Last colonoscopy: Not on file n/a  HPV vaccination?: yes    Family History   Problem Relation Age of Onset    Hypertension Father     Arthritis Father     Anxiety disorder Father     Hyperlipidemia Father     Diabetes Maternal Grandfather     Breast cancer Paternal Grandmother     Cancer Paternal Grandmother         thyroid    Anxiety disorder Mother     Diabetes Mother     Depression Mother     Fibroids Mother     Infertility Mother        Social History   Social History     Substance and Sexual Activity   Alcohol Use Yes    Comment: socially      Social History     Substance and Sexual Activity   Drug Use No     Social History     Tobacco Use   Smoking Status Never Smoker   Smokeless Tobacco Never Used       Sexually active?  No  Current sexual partner(s): boyfriend  History of STD: no  Interested in STD screening today? no  Concerns about sex: none    Occupation: Paraprofessional      Current Outpatient Medications:     busPIRone (BUSPAR) 5 mg tablet, Take 5 mg by mouth 3 (three) times a day, Disp: , Rfl:     famotidine (PEPCID) 20 mg tablet, Take 20 mg by mouth 2 (two) times a day, Disp: , Rfl:     norethindrone-ethinyl estradiol (JUNEL FE 1/20) 1-20 MG-MCG per tablet, Take 1 tablet by mouth daily, Disp: , Rfl:     pantoprazole (PROTONIX) 40 mg tablet, Take 40 mg by mouth daily, Disp: , Rfl:     Sertraline HCl (ZOLOFT PO), Take by mouth daily, Disp: , Rfl:     ALPRAZolam (XANAX PO), Take by mouth as needed, Disp: , Rfl:     ondansetron (ZOFRAN-ODT) 4 mg disintegrating tablet, Take 1 tablet (4 mg total) by mouth every 8 (eight) hours as needed for nausea or vomiting for up to 5 days (Patient not taking: Reported on 11/19/2019), Disp: 15 tablet, Rfl: 0    Allergies   Allergen Reactions    Augmentin [Amoxicillin-Pot Clavulanate] Vomiting    Bee Pollen Itching    Pollen Extract Itching       Objective   Vitals: Blood pressure 126/80, weight 92 4 kg (203 lb 12 8 oz), last menstrual period 11/02/2019  Body mass index is 34 98 kg/m²  General: NAD, AAOx3  Heart: RRR  Lungs: CTAB  Neck: supple, no thyromegaly or thyroid nodules appreciated    Breast: nipples everted bilaterally, no skin changes  No dimpling, redness, or erythema  No breast masses or axillary masses bilaterally  Fibrocystic breast changes  Abdomen: soft, non-distended, non tender to palpation  Extremities: non-tender to palpation  Speculum exam: Normal appearing external genitalia, normal hair distribution  Urethra well-suspended, no clitoromegaly noted  Vagina point moist well-rugated  Physiologic discharge noted  Cervix without lesion, parous appearing  no blood in vaginal vault    Pelvic exam: no cervical motion tenderness  No adnexal masses or tenderness  anteverted uterus, normal size  Lab Results:   No visits with results within 1 Day(s) from this visit     Latest known visit with results is:   Office Visit on 07/30/2018   Component Date Value    LEUKOCYTE ESTERASE,UA 07/30/2018 large     NITRITE,UA 07/30/2018 Pos     SL AMB POCT UROBILINOGEN 07/30/2018 0 2     POCT URINE PROTEIN 07/30/2018 Neg      PH,UA 07/30/2018 7 0     BLOOD,UA 07/30/2018 Large     SPECIFIC GRAVITY,UA 07/30/2018 1 010     KETONES,UA 07/30/2018 Neg     BILIRUBIN,UA 07/30/2018 Neg     GLUCOSE, UA 2018 Neg      COLOR,UA 2018 yellow     CLARITY,UA 2018 Cloudy     Urine Culture 2018 >100,000 cfu/ml Escherichia coli*    Urine Culture 2018 10,000-19,000 cfu/ml          Assessment/Plan     33 y o   with normal annual gynecologic examination  Diagnoses and all orders for this visit:    Encounter for gynecological examination  -     Liquid-based pap, screening   - Happy with Kelvin Novak currently, Rx given by PCP   - No history of deep vein thrombosis, pulmonary embolism, stroke, hypertension, smoking, or migraines with aura  Reviewed slightly increased risk of the above with estrogen-containing pills, expressed understanding   - inquired regarding stopping pills - advised she may if she desires pregnancy, recommend PNV, stop smoking/drinking, MFM consult (see below)   - Also reviewed does not meet BRCA criteria for genetic testing at this time  Recommend mammo screening age 37  Would be different if symptomatic  Strongly recommend that her mother investigate her own eligibility for screening as her mother's cousin had breast cancer age 27  If positive for abnormality, would recommend genetic testing  And early mammo for Celi at that time  Sickle cell trait ([facility] [address] )  -     Ambulatory Referral to Maternal Fetal Medicine; Future   - Boyfriend also with sickle cell trait - in their previous pregnancy with their son, significant testing undergone, son born without trait     - discussed risk of sickle cell anemia in future children - would like referral for preconception counseling with MFM for this as well as her history of Hashimoto's thyroiditis   -  Screening for STDs (sexually transmitted diseases)  -     Chlamydia/GC amplified DNA by PCR   - Offered bloodwork for HepB, HepC, HIV, RPR - declined    Hashimoto's thyroiditis   - Continue to follow with PCP   - discussed importance of following TSH in future pregnancy, likely increased needs, possibility of needing medical intervention for her and baby's health, expressed understanding    Follow up PRN or for one year annual exam

## 2019-11-21 ENCOUNTER — TELEPHONE (OUTPATIENT)
Dept: OBGYN CLINIC | Facility: CLINIC | Age: 29
End: 2019-11-21

## 2019-11-21 NOTE — TELEPHONE ENCOUNTER
----- Message from Lisandra Christian MD sent at 11/21/2019  7:25 AM EST -----  Please let Darien Moranjin know the good news that her gonorrhea and chlamydia returned negative   Thank you! -AMM

## 2019-11-21 NOTE — RESULT ENCOUNTER NOTE
Please let Jarvis Joaquin know the good news that her gonorrhea and chlamydia returned negative   Thank you! -AMM

## 2019-11-21 NOTE — TELEPHONE ENCOUNTER
----- Message from Argentina Padilla MD sent at 11/21/2019  7:25 AM EST -----  Please let Anushka Gore know the good news that her gonorrhea and chlamydia returned negative   Thank you! -AMM

## 2019-11-22 LAB
LAB AP GYN PRIMARY INTERPRETATION: NORMAL
Lab: NORMAL

## 2019-11-25 ENCOUNTER — TELEPHONE (OUTPATIENT)
Dept: OBGYN CLINIC | Facility: CLINIC | Age: 29
End: 2019-11-25

## 2019-11-25 NOTE — TELEPHONE ENCOUNTER
----- Message from Maria De Jesus Peña MD sent at 11/22/2019  3:10 PM EST -----  Please let Noreen Gonzalez know the good news that her Pap smear returned normal   Next pap due in 3 years but would still like to see yearly for annual exams    Thank you! -AMM

## 2019-12-05 ENCOUNTER — CONSULT (OUTPATIENT)
Dept: PERINATAL CARE | Facility: CLINIC | Age: 29
End: 2019-12-05

## 2019-12-05 DIAGNOSIS — D57.3 SICKLE CELL TRAIT (HCC): Primary | ICD-10-CM

## 2019-12-05 DIAGNOSIS — O35.2XX0 HEREDITARY DISEASE IN FAMILY POSSIBLY AFFECTING FETUS, AFFECTING MANAGEMENT OF MOTHER IN PREGNANCY, SINGLE OR UNSPECIFIED FETUS: ICD-10-CM

## 2019-12-05 DIAGNOSIS — Z31.5 ENCOUNTER FOR PROCREATIVE GENETIC COUNSELING: ICD-10-CM

## 2019-12-05 PROCEDURE — NC001 PR NO CHARGE

## 2019-12-05 NOTE — PROGRESS NOTES
Genetic Counseling   High-Risk Gestation Note    Appointment Date:  2019  Referred By: Yeyo Tam MD  YOB: 1990  Partner:  Marisol Parish     Indication for Visit:  personal and/or family history of genetic disorder:  Patient and partner both have sickle cell trait  Pregnancy History:   Estimated Date of Delivery: NA  Estimated Gestational Age: NA    Genetic Counseling: Terri Lora is a 30year old female here for preconception counseling to discuss sickle cell trait in her and her partner, as well as a family history of autism  Issues Discussed:  average population risk- 3-4% in the average pregnancy of serious condition or birth defect  2-3% risk of intellectual disabilites  Not all detected by prenatal testing  Chromosomal: non-disjunction-  risk for Down syndrome and  risk for any chromosome abnormality at age 35 at delivery  Clinical course and variability of sickle cell disease and autism  Mode of inheritance/mechanism:  Autosomal recessive for hemoglobinopathies, multifactorial for autism  Risk to future pregnancies: 25% for sickle cell disease, increased over the general population risk for autism  Options Discussed:  Amniocentesis-risks and limitations discussed  CVS-Risks and limitations discussed  Ethnic screening discussed-clinical and genetic basis of SMA, Fragile X  Variability and treatment addressed  Level II ultrasound to screen for structural anomalies  Multiple marker serum screen  Nuchal translucency/1st trimester serum screen- goals and limitations discussed  Risk avoidance options: IVF with Preimplantation genetic diagnosis (PGD)   [place] screening for hemoglobinopathies  Additional Information / Impression:  Terri Lora is a 30 y o  female who presented for genetic counseling to discuss a history of sickle cell trait for her and her parter  She is not currently pregnant    Terri Lora states that she was told she had sickle cell trait as a child and had a hemoglobin electrophoresis which confirmed her trait status in  (results in [address])  During her previous pregnancy her partner was also informed he has sickle cell trait  His hemoglobin electrophoresis results were not available to review to confirm if he has hemoglobin S or hemoglobin C trait  Leroy Simpson states that an amniocentesis was performed when she was pregnant with her son and her son was found to be unaffected with sickle cell disease  She was not aware if he had trait  We reviewed the diagnosis and prognosis as well as the associated morbidity and mortality of sickle cell disease, including the clinical differences of hemoglobin S/S and S/C  We reviewed the autosomal recessive inheritance pattern, thus each pregnancy is at 25% risk to be affected  Prenatal diagnosis, such as CVS and amniocentesis is available  The benefits and limitations of each were reviewed  We reviewed  screening in the state of [place] for hemoglobinopathies  We also discussed the availability of preimplantation genetic diagnosis (PGD) with in vitro fertilization (IVF)  PGD is a reproductive technology used with an IVF cycle for diagnosis of a genetic disease in early embryos prior to implantation and pregnancy  We discussed some of the risks, benefits, and limitations of PGD including multiple IVF cycles, cost involved and possibility of an unaffected child  We briefly reviewed the patient's age related risks for aneuploidy as noted above  We discussed the available prenatal screening options  Sequential screening consists of first trimester measurement of nuchal translucency combined with first trimester biochemical analysis, as well as second trimester biochemical analysis  It is able to detect approximately 90% of pregnancies in which the fetus has Down syndrome, 90% of pregnancies in which the fetus has trisomy 25 and 80% of pregnancies which the fetus has an open neural tube defect  We reviewed that level II anatomy ultrasound is typically performed at approximately 20 weeks gestation  Level II ultrasound evaluation is between 60-80% accurate in detecting major physical birth defects and variations in fetal development that may be associated with chromosome abnormalities  Level II ultrasound evaluation is not able to detect all birth defects or health problems  Brenna Llanos reports a brother with autism, etiology unknown  We reviewed the general population risk for a diagnosis of autism, which is estimated at approximately 1/  We also reviewed the possible etiologies of autism, including multifactorial and genetic  Autism may be secondary to a chromosomal abnormality or single gene disorder; a genetic cause can now be identified in up to approximately 35-40% of cases  However there are over 800 genes associated with autism that have been identified to date, making a prenatal diagnosis and risk assessment difficult without records on the affected individual   We discussed that the risk for autism in a future pregnancy may be elevated over the general population risk  Further review of family history for the patient and her partner was noncontributory  The family history was not significant for other genetic diseases or disorders, intellectual disability, birth defects, fetal loss, or consanguinity  Patient reports being of / decent and that her partner is of  decent  She denies either of them having known Ashkenazi Mandaeism ancestry  The benefits and limitations of Cystic fibrosis (CF), Spinal muscular atrophy (SMA), Fragile X and expanded carrier screening was discussed  The patient is considering carrier screening and will contact our office if she elects to pursue any of the blood work      Lastly, we discussed the fact that everyone in the general population regardless of age, family history, or medical background has approximately a 3-5% risk of having a child with some type of congenital anomaly, genetic disease or intellectual disability  Currently there are no tests available to rule out all birth defects or health problems  Ciara Corrigan was provided with take home literature and our contact information  I encouraged her to call with any questions or concerns  Time spent with Genetic Counselor: 60 minutes    Plan / Tests Ordered:  1) Patient considering carrier screening - will contact our office if interested  2) Follow-up genetic counseling when a pregnancy is achieved

## 2019-12-16 VITALS
SYSTOLIC BLOOD PRESSURE: 109 MMHG | HEIGHT: 64 IN | WEIGHT: 203.8 LBS | BODY MASS INDEX: 34.79 KG/M2 | HEART RATE: 77 BPM | DIASTOLIC BLOOD PRESSURE: 69 MMHG

## 2020-01-07 ENCOUNTER — OFFICE VISIT (OUTPATIENT)
Dept: URGENT CARE | Age: 30
End: 2020-01-07
Payer: COMMERCIAL

## 2020-01-07 VITALS
RESPIRATION RATE: 16 BRPM | HEIGHT: 64 IN | DIASTOLIC BLOOD PRESSURE: 71 MMHG | WEIGHT: 200 LBS | SYSTOLIC BLOOD PRESSURE: 111 MMHG | HEART RATE: 86 BPM | TEMPERATURE: 97.5 F | BODY MASS INDEX: 34.15 KG/M2 | OXYGEN SATURATION: 98 %

## 2020-01-07 DIAGNOSIS — R11.0 NAUSEA: ICD-10-CM

## 2020-01-07 DIAGNOSIS — J06.9 ACUTE UPPER RESPIRATORY INFECTION: Primary | ICD-10-CM

## 2020-01-07 PROCEDURE — 99213 OFFICE O/P EST LOW 20 MIN: CPT | Performed by: FAMILY MEDICINE

## 2020-01-07 RX ORDER — AZITHROMYCIN 250 MG/1
TABLET, FILM COATED ORAL
Qty: 6 TABLET | Refills: 0 | Status: SHIPPED | OUTPATIENT
Start: 2020-01-07 | End: 2020-01-11

## 2020-01-07 RX ORDER — ONDANSETRON 4 MG/1
4 TABLET, ORALLY DISINTEGRATING ORAL EVERY 6 HOURS PRN
Qty: 10 TABLET | Refills: 0 | Status: SHIPPED | OUTPATIENT
Start: 2020-01-07 | End: 2021-03-05 | Stop reason: SDUPTHER

## 2020-01-07 NOTE — LETTER
January 7, 2020     Patient: Stephen Phillips   YOB: 1990   Date of Visit: 1/7/2020       To Whom It May Concern: It is my medical opinion that [employer] may return to work on 01/09/2020  If you have any questions or concerns, please don't hesitate to call           Sincerely,        Martinez Acosta DO    CC: No Recipients

## 2020-01-07 NOTE — PROGRESS NOTES
[address] [employer]        NAME: Paola Busby is a 30 y o  female  : 1990    MRN: [id number]  DATE: 2020  TIME: 1:30 PM    Assessment and Plan   Acute upper respiratory infection [J06 9]  1  Acute upper respiratory infection  azithromycin (ZITHROMAX) 250 mg tablet   2  Nausea  ondansetron (ZOFRAN-ODT) 4 mg disintegrating tablet         Patient Instructions     Patient Instructions   1 Zofran dissolvable tablet every 4-6 hours as needed for nausea/vomiting  Pedialyte/sports drinks, light/BRAT diet  Z-Duc as directed 2 initially then 1 daily until finished (please take probiotics)  Cold/cough medication as needed  Tylenol as needed  Recheck/follow-up with family physician as needed  Please go to the hospital emergency department if needed  Follow up with PCP in 3-5 days  Proceed to  ER if symptoms worsen  Chief Complaint     Chief Complaint   Patient presents with    Cough     Pt complaining of cough, nausea and chest congestion x2 days  Denies fevers  History of Present Illness       Congestion, cough, nausea (no vomiting or diarrhea)      Review of Systems   Review of Systems   HENT: Positive for congestion  Respiratory: Positive for cough  Cardiovascular: Negative  Gastrointestinal: Positive for nausea  Negative for diarrhea and vomiting  Musculoskeletal: Negative  Skin: Negative  Neurological: Negative            Current Medications       Current Outpatient Medications:     ALPRAZolam (XANAX PO), Take by mouth as needed, Disp: , Rfl:     busPIRone (BUSPAR) 5 mg tablet, Take 5 mg by mouth 3 (three) times a day, Disp: , Rfl:     norethindrone-ethinyl estradiol (JUNEL FE 1/20) 1-20 MG-MCG per tablet, Take 1 tablet by mouth daily, Disp: , Rfl:     pantoprazole (PROTONIX) 40 mg tablet, Take 40 mg by mouth daily, Disp: , Rfl:     Sertraline HCl (ZOLOFT PO), Take by mouth daily, Disp: , Rfl:     azithromycin (ZITHROMAX) 250 mg tablet, Take 2 tablets today then 1 tablet daily x 4 days, Disp: 6 tablet, Rfl: 0    famotidine (PEPCID) 20 mg tablet, Take 20 mg by mouth 2 (two) times a day, Disp: , Rfl:     ondansetron (ZOFRAN-ODT) 4 mg disintegrating tablet, Take 1 tablet (4 mg total) by mouth every 8 (eight) hours as needed for nausea or vomiting for up to 5 days (Patient not taking: Reported on 11/19/2019), Disp: 15 tablet, Rfl: 0    ondansetron (ZOFRAN-ODT) 4 mg disintegrating tablet, Take 1 tablet (4 mg total) by mouth every 6 (six) hours as needed for nausea or vomiting for up to 10 doses, Disp: 10 tablet, Rfl: 0    Current Allergies     Allergies as of 01/07/2020 - Reviewed 01/07/2020   Allergen Reaction Noted    Augmentin [amoxicillin-pot clavulanate] Vomiting 08/09/2019    Bee pollen Itching 04/25/2017    Pollen extract Itching 04/25/2017            The following portions of the patient's history were reviewed and updated as appropriate: allergies, current medications, past family history, past medical history, past social history, past surgical history and problem list      Past Medical History:   Diagnosis Date    Anemia     Anxiety     Asthma     Depression     Disease of thyroid gland     GERD (gastroesophageal reflux disease)     Varicella        Past Surgical History:   Procedure Laterality Date    COLONOSCOPY  2015    THYROIDECTOMY, PARTIAL  2017       Family History   Problem Relation Age of Onset    Hypertension Father     Arthritis Father     Anxiety disorder Father     Hyperlipidemia Father     Diabetes Maternal Grandfather     Breast cancer Paternal Grandmother     Cancer Paternal Grandmother         thyroid    Anxiety disorder Mother     Diabetes Mother     Depression Mother     Fibroids Mother     Infertility Mother     Sickle cell trait Brother     Heart murmur Brother     Heart murmur Son     Autism Brother     Bleeding Disorder Brother     Seizures Brother          Medications have been verified  Objective   /71 (BP Location: Right arm, Patient Position: Sitting)   Pulse 86   Temp 97 5 °F (36 4 °C) (Temporal)   Resp 16   Ht 5' 4" (1 626 m)   Wt 90 7 kg (200 lb)   LMP 01/01/2020   SpO2 98%   BMI 34 33 kg/m²        Physical Exam     Physical Exam   Constitutional: She is oriented to person, place, and time  She appears well-developed and well-nourished  HENT:   Right Ear: External ear normal    Left Ear: External ear normal    Slight nasal congestion; slight injection of the oropharynx   Neck: Normal range of motion  Neck supple  Cardiovascular: Normal rate, regular rhythm and normal heart sounds  Pulmonary/Chest: Effort normal and breath sounds normal    Abdominal: Soft  Bowel sounds are normal  She exhibits no distension  There is no tenderness  There is no guarding  Neurological: She is alert and oriented to person, place, and time  No nuchal rigidity   Skin:   Good color and turgor   Psychiatric: She has a normal mood and affect  Her behavior is normal    Nursing note and vitals reviewed

## 2020-01-07 NOTE — PATIENT INSTRUCTIONS
1 Zofran dissolvable tablet every 4-6 hours as needed for nausea/vomiting  Pedialyte/sports drinks, light/BRAT diet  Z-Duc as directed 2 initially then 1 daily until finished (please take probiotics)  Cold/cough medication as needed  Tylenol as needed  Recheck/follow-up with family physician as needed  Please go to the hospital emergency department if needed

## 2020-12-09 ENCOUNTER — NURSE TRIAGE (OUTPATIENT)
Dept: OTHER | Facility: OTHER | Age: 30
End: 2020-12-09

## 2020-12-09 DIAGNOSIS — Z20.828 EXPOSURE TO SARS-ASSOCIATED CORONAVIRUS: Primary | ICD-10-CM

## 2020-12-10 DIAGNOSIS — Z20.828 EXPOSURE TO SARS-ASSOCIATED CORONAVIRUS: ICD-10-CM

## 2020-12-10 PROCEDURE — U0003 INFECTIOUS AGENT DETECTION BY NUCLEIC ACID (DNA OR RNA); SEVERE ACUTE RESPIRATORY SYNDROME CORONAVIRUS 2 (SARS-COV-2) (CORONAVIRUS DISEASE [COVID-19]), AMPLIFIED PROBE TECHNIQUE, MAKING USE OF HIGH THROUGHPUT TECHNOLOGIES AS DESCRIBED BY CMS-2020-01-R: HCPCS | Performed by: FAMILY MEDICINE

## 2020-12-11 LAB — SARS-COV-2 RNA SPEC QL NAA+PROBE: NOT DETECTED

## 2021-01-14 DIAGNOSIS — Z20.822 ENCOUNTER FOR SCREENING LABORATORY TESTING FOR COVID-19 VIRUS: ICD-10-CM

## 2021-01-14 PROCEDURE — U0003 INFECTIOUS AGENT DETECTION BY NUCLEIC ACID (DNA OR RNA); SEVERE ACUTE RESPIRATORY SYNDROME CORONAVIRUS 2 (SARS-COV-2) (CORONAVIRUS DISEASE [COVID-19]), AMPLIFIED PROBE TECHNIQUE, MAKING USE OF HIGH THROUGHPUT TECHNOLOGIES AS DESCRIBED BY CMS-2020-01-R: HCPCS | Performed by: FAMILY MEDICINE

## 2021-01-14 PROCEDURE — U0005 INFEC AGEN DETEC AMPLI PROBE: HCPCS | Performed by: FAMILY MEDICINE

## 2021-01-15 LAB — SARS-COV-2 RNA SPEC QL NAA+PROBE: NOT DETECTED

## 2021-01-21 ENCOUNTER — TRANSCRIBE ORDERS (OUTPATIENT)
Dept: ADMINISTRATIVE | Facility: HOSPITAL | Age: 31
End: 2021-01-21

## 2021-01-21 ENCOUNTER — HOSPITAL ENCOUNTER (OUTPATIENT)
Dept: RADIOLOGY | Facility: HOSPITAL | Age: 31
Discharge: HOME | End: 2021-01-21
Payer: COMMERCIAL

## 2021-01-21 DIAGNOSIS — R05.9 COUGH: Primary | ICD-10-CM

## 2021-01-21 DIAGNOSIS — R05.9 COUGH: ICD-10-CM

## 2021-01-21 PROCEDURE — 71046 X-RAY EXAM CHEST 2 VIEWS: CPT

## 2021-03-05 ENCOUNTER — APPOINTMENT (EMERGENCY)
Dept: CT IMAGING | Facility: HOSPITAL | Age: 31
End: 2021-03-05
Payer: COMMERCIAL

## 2021-03-05 ENCOUNTER — HOSPITAL ENCOUNTER (EMERGENCY)
Facility: HOSPITAL | Age: 31
Discharge: HOME | End: 2021-03-05
Attending: EMERGENCY MEDICINE
Payer: COMMERCIAL

## 2021-03-05 ENCOUNTER — OFFICE VISIT (OUTPATIENT)
Dept: URGENT CARE | Age: 31
End: 2021-03-05
Payer: COMMERCIAL

## 2021-03-05 VITALS
HEART RATE: 80 BPM | SYSTOLIC BLOOD PRESSURE: 129 MMHG | DIASTOLIC BLOOD PRESSURE: 86 MMHG | TEMPERATURE: 98.6 F | OXYGEN SATURATION: 97 % | RESPIRATION RATE: 16 BRPM

## 2021-03-05 VITALS — HEART RATE: 100 BPM | TEMPERATURE: 97.1 F | OXYGEN SATURATION: 97 % | RESPIRATION RATE: 18 BRPM

## 2021-03-05 DIAGNOSIS — K92.1 BLOOD IN STOOL: ICD-10-CM

## 2021-03-05 DIAGNOSIS — K29.70 GASTRITIS: ICD-10-CM

## 2021-03-05 DIAGNOSIS — R10.30 LOWER ABDOMINAL PAIN: Primary | ICD-10-CM

## 2021-03-05 DIAGNOSIS — R11.0 NAUSEA: ICD-10-CM

## 2021-03-05 DIAGNOSIS — R09.81 NASAL SINUS CONGESTION: ICD-10-CM

## 2021-03-05 DIAGNOSIS — K59.00 CONSTIPATION: ICD-10-CM

## 2021-03-05 LAB
ALBUMIN SERPL BCP-MCNC: 4.1 G/DL (ref 3.5–5)
ALP SERPL-CCNC: 89 U/L (ref 46–116)
ALT SERPL W P-5'-P-CCNC: 23 U/L (ref 12–78)
ANION GAP SERPL CALCULATED.3IONS-SCNC: 12 MMOL/L (ref 4–13)
AST SERPL W P-5'-P-CCNC: 10 U/L (ref 5–45)
BACTERIA UR QL AUTO: ABNORMAL /HPF
BASOPHILS # BLD AUTO: 0.08 THOUSANDS/ΜL (ref 0–0.1)
BASOPHILS NFR BLD AUTO: 1 % (ref 0–1)
BILIRUB SERPL-MCNC: 0.37 MG/DL (ref 0.2–1)
BILIRUB UR QL STRIP: NEGATIVE
BUN SERPL-MCNC: 11 MG/DL (ref 5–25)
CALCIUM SERPL-MCNC: 9.3 MG/DL (ref 8.3–10.1)
CHLORIDE SERPL-SCNC: 106 MMOL/L (ref 100–108)
CLARITY UR: ABNORMAL
CO2 SERPL-SCNC: 23 MMOL/L (ref 21–32)
COLOR UR: YELLOW
CREAT SERPL-MCNC: 0.95 MG/DL (ref 0.6–1.3)
EOSINOPHIL # BLD AUTO: 0.32 THOUSAND/ΜL (ref 0–0.61)
EOSINOPHIL NFR BLD AUTO: 3 % (ref 0–6)
ERYTHROCYTE [DISTWIDTH] IN BLOOD BY AUTOMATED COUNT: 11.9 % (ref 11.6–15.1)
EXT PREG TEST URINE: NEGATIVE
EXT. CONTROL ED NAV: NORMAL
GFR SERPL CREATININE-BSD FRML MDRD: 94 ML/MIN/1.73SQ M
GLUCOSE SERPL-MCNC: 81 MG/DL (ref 65–140)
GLUCOSE UR STRIP-MCNC: NEGATIVE MG/DL
HCT VFR BLD AUTO: 41.3 % (ref 34.8–46.1)
HGB BLD-MCNC: 13.7 G/DL (ref 11.5–15.4)
HGB UR QL STRIP.AUTO: ABNORMAL
IMM GRANULOCYTES # BLD AUTO: 0.03 THOUSAND/UL (ref 0–0.2)
IMM GRANULOCYTES NFR BLD AUTO: 0 % (ref 0–2)
KETONES UR STRIP-MCNC: NEGATIVE MG/DL
LEUKOCYTE ESTERASE UR QL STRIP: ABNORMAL
LIPASE SERPL-CCNC: 79 U/L (ref 73–393)
LYMPHOCYTES # BLD AUTO: 2.37 THOUSANDS/ΜL (ref 0.6–4.47)
LYMPHOCYTES NFR BLD AUTO: 21 % (ref 14–44)
MCH RBC QN AUTO: 29.5 PG (ref 26.8–34.3)
MCHC RBC AUTO-ENTMCNC: 33.2 G/DL (ref 31.4–37.4)
MCV RBC AUTO: 89 FL (ref 82–98)
MONOCYTES # BLD AUTO: 0.85 THOUSAND/ΜL (ref 0.17–1.22)
MONOCYTES NFR BLD AUTO: 8 % (ref 4–12)
NEUTROPHILS # BLD AUTO: 7.64 THOUSANDS/ΜL (ref 1.85–7.62)
NEUTS SEG NFR BLD AUTO: 67 % (ref 43–75)
NITRITE UR QL STRIP: NEGATIVE
NON-SQ EPI CELLS URNS QL MICRO: ABNORMAL /HPF
NRBC BLD AUTO-RTO: 0 /100 WBCS
PH UR STRIP.AUTO: 5.5 [PH] (ref 4.5–8)
PLATELET # BLD AUTO: 278 THOUSANDS/UL (ref 149–390)
PMV BLD AUTO: 9.8 FL (ref 8.9–12.7)
POTASSIUM SERPL-SCNC: 3.9 MMOL/L (ref 3.5–5.3)
PROT SERPL-MCNC: 8.3 G/DL (ref 6.4–8.2)
PROT UR STRIP-MCNC: NEGATIVE MG/DL
RBC # BLD AUTO: 4.65 MILLION/UL (ref 3.81–5.12)
RBC #/AREA URNS AUTO: ABNORMAL /HPF
SODIUM SERPL-SCNC: 141 MMOL/L (ref 136–145)
SP GR UR STRIP.AUTO: 1.02 (ref 1–1.03)
UROBILINOGEN UR QL STRIP.AUTO: 0.2 E.U./DL
WBC # BLD AUTO: 11.29 THOUSAND/UL (ref 4.31–10.16)
WBC #/AREA URNS AUTO: ABNORMAL /HPF

## 2021-03-05 PROCEDURE — 99284 EMERGENCY DEPT VISIT MOD MDM: CPT

## 2021-03-05 PROCEDURE — 36415 COLL VENOUS BLD VENIPUNCTURE: CPT

## 2021-03-05 PROCEDURE — 85025 COMPLETE CBC W/AUTO DIFF WBC: CPT | Performed by: EMERGENCY MEDICINE

## 2021-03-05 PROCEDURE — 81025 URINE PREGNANCY TEST: CPT | Performed by: EMERGENCY MEDICINE

## 2021-03-05 PROCEDURE — 99284 EMERGENCY DEPT VISIT MOD MDM: CPT | Performed by: EMERGENCY MEDICINE

## 2021-03-05 PROCEDURE — 83690 ASSAY OF LIPASE: CPT | Performed by: EMERGENCY MEDICINE

## 2021-03-05 PROCEDURE — 80053 COMPREHEN METABOLIC PANEL: CPT | Performed by: EMERGENCY MEDICINE

## 2021-03-05 PROCEDURE — 99213 OFFICE O/P EST LOW 20 MIN: CPT | Performed by: PHYSICIAN ASSISTANT

## 2021-03-05 PROCEDURE — 74177 CT ABD & PELVIS W/CONTRAST: CPT

## 2021-03-05 PROCEDURE — 81001 URINALYSIS AUTO W/SCOPE: CPT

## 2021-03-05 RX ORDER — DOCUSATE SODIUM 100 MG/1
100 CAPSULE, LIQUID FILLED ORAL EVERY 12 HOURS
Qty: 60 CAPSULE | Refills: 0 | Status: SHIPPED | OUTPATIENT
Start: 2021-03-05

## 2021-03-05 RX ORDER — DICYCLOMINE HCL 20 MG
20 TABLET ORAL EVERY 6 HOURS PRN
Qty: 20 TABLET | Refills: 0 | Status: SHIPPED | OUTPATIENT
Start: 2021-03-05 | End: 2021-04-15 | Stop reason: SDUPTHER

## 2021-03-05 RX ORDER — ONDANSETRON 4 MG/1
4 TABLET, ORALLY DISINTEGRATING ORAL ONCE
Status: COMPLETED | OUTPATIENT
Start: 2021-03-05 | End: 2021-03-05

## 2021-03-05 RX ORDER — PANTOPRAZOLE SODIUM 40 MG/1
40 TABLET, DELAYED RELEASE ORAL DAILY
Qty: 30 TABLET | Refills: 0 | Status: SHIPPED | OUTPATIENT
Start: 2021-03-05 | End: 2021-04-04

## 2021-03-05 RX ORDER — ONDANSETRON 4 MG/1
4 TABLET, ORALLY DISINTEGRATING ORAL EVERY 6 HOURS PRN
Qty: 10 TABLET | Refills: 0 | Status: SHIPPED | OUTPATIENT
Start: 2021-03-05 | End: 2021-04-15 | Stop reason: SDUPTHER

## 2021-03-05 RX ADMIN — ONDANSETRON 4 MG: 4 TABLET, ORALLY DISINTEGRATING ORAL at 14:09

## 2021-03-05 RX ADMIN — IOHEXOL 100 ML: 350 INJECTION, SOLUTION INTRAVENOUS at 15:15

## 2021-03-05 NOTE — PROGRESS NOTES
[address]        NAME: Sherry Fofana is a 35 y o  female  : 1990    MRN: [id number]  DATE: 2021  TIME: 1:25 PM    Assessment and Plan   Lower abdominal pain [R10 30]  1  Lower abdominal pain  Transfer to other facility   2  Nausea  Transfer to other facility   3  Blood in stool  Transfer to other facility   4  Nasal sinus congestion  Transfer to other facility     LLQ pain, RLQ pain, blood in stool x 3 days  Nausea w/o vomiting  Some congestion  Denies any travel or known COVID exposures    Patient Instructions       Patient would like to go via private vehicle to [facility] Emergency Department  Please go to the [facility] Emergency Department now for further evaluation and treatment- hospital address verified with the patient  Patient agreed to go immediately to the ED  Chief Complaint     Chief Complaint   Patient presents with    Nausea     with head congestion x 2 days  History of Present Illness        20-year-old female presents with bilateral lower abdominal pain, nausea, congestion, blood in stool times 2-3 days  States she has tried Tums, Pepcid with no relief  Denies any fevers, vomiting, sore throat, loss of taste/smell or other cold-like symptoms  She denies any urinary complaints or abnormal vaginal bleeding  She denies any pregnancy risk  States he does have a history of gastritis and hemorrhoids, states this feels different  States she is having decreased appetite but still drinking fluids  Denies any travel or known COVID exposures  Review of Systems   Review of Systems   Constitutional: Positive for appetite change  Negative for activity change, chills, fatigue and fever  HENT: Positive for congestion, postnasal drip and rhinorrhea  Negative for ear pain, facial swelling, sinus pressure, sore throat, trouble swallowing and voice change  Eyes: Negative for discharge, itching and visual disturbance     Respiratory: Negative for cough, chest tightness, shortness of breath and wheezing  Cardiovascular: Negative for chest pain  Gastrointestinal: Positive for abdominal pain, blood in stool and nausea  Negative for diarrhea and vomiting  Genitourinary: Negative  Musculoskeletal: Negative for back pain and neck pain  Skin: Negative for rash  Neurological: Negative for dizziness, syncope, weakness, numbness and headaches  All other systems reviewed and are negative          Current Medications       Current Outpatient Medications:     ALPRAZolam (XANAX PO), Take by mouth as needed, Disp: , Rfl:     busPIRone (BUSPAR) 5 mg tablet, Take 5 mg by mouth 3 (three) times a day, Disp: , Rfl:     norethindrone-ethinyl estradiol (JUNEL FE 1/20) 1-20 MG-MCG per tablet, Take 1 tablet by mouth daily, Disp: , Rfl:     Sertraline HCl (ZOLOFT PO), Take by mouth daily, Disp: , Rfl:     famotidine (PEPCID) 20 mg tablet, Take 20 mg by mouth 2 (two) times a day, Disp: , Rfl:     ondansetron (ZOFRAN-ODT) 4 mg disintegrating tablet, Take 1 tablet (4 mg total) by mouth every 6 (six) hours as needed for nausea or vomiting for up to 10 doses (Patient not taking: Reported on 3/5/2021), Disp: 10 tablet, Rfl: 0    pantoprazole (PROTONIX) 40 mg tablet, Take 40 mg by mouth daily, Disp: , Rfl:     Current Allergies     Allergies as of 03/05/2021 - Reviewed 03/05/2021   Allergen Reaction Noted    Augmentin [amoxicillin-pot clavulanate] Vomiting 08/09/2019    Bee pollen Itching 04/25/2017    Pollen extract Itching 04/25/2017            The following portions of the patient's history were reviewed and updated as appropriate: allergies, current medications, past family history, past medical history, past social history, past surgical history and problem list      Past Medical History:   Diagnosis Date    Anemia     Anxiety     Asthma     Depression     Disease of thyroid gland     GERD (gastroesophageal reflux disease)     Varicella Past Surgical History:   Procedure Laterality Date    COLONOSCOPY  2015    THYROIDECTOMY, PARTIAL  2017       Family History   Problem Relation Age of Onset    Hypertension Father     Arthritis Father     Anxiety disorder Father     Hyperlipidemia Father     Diabetes Maternal Grandfather     Breast cancer Paternal Grandmother     Cancer Paternal Grandmother         thyroid    Anxiety disorder Mother     Diabetes Mother     Depression Mother     Fibroids Mother     Infertility Mother     Sickle cell trait Brother     Heart murmur Brother     Heart murmur Son     Autism Brother     Bleeding Disorder Brother     Seizures Brother          Medications have been verified  Objective   Pulse 100   Temp (!) 97 1 °F (36 2 °C)   Resp 18   LMP 02/27/2021   SpO2 97%        Physical Exam     Physical Exam  Vitals signs and nursing note reviewed  Constitutional:       General: She is not in acute distress  Appearance: Normal appearance  She is well-developed  She is not toxic-appearing  HENT:      Head: Normocephalic and atraumatic  Right Ear: Tympanic membrane normal       Left Ear: Tympanic membrane normal       Mouth/Throat:      Mouth: Mucous membranes are moist       Pharynx: Oropharynx is clear  No oropharyngeal exudate or posterior oropharyngeal erythema  Eyes:      Conjunctiva/sclera: Conjunctivae normal       Pupils: Pupils are equal, round, and reactive to light  Neck:      Musculoskeletal: Normal range of motion and neck supple  Cardiovascular:      Rate and Rhythm: Normal rate and regular rhythm  Heart sounds: Normal heart sounds  Pulmonary:      Effort: Pulmonary effort is normal       Breath sounds: Normal breath sounds  No wheezing  Abdominal:      General: Bowel sounds are normal       Palpations: Abdomen is soft  Tenderness: There is abdominal tenderness in the right lower quadrant and left lower quadrant  There is no guarding or rebound  Genitourinary:     Comments: Unable to do in the setting  Skin:     Capillary Refill: Capillary refill takes less than 2 seconds  Neurological:      Mental Status: She is alert and oriented to person, place, and time     Psychiatric:         Behavior: Behavior normal

## 2021-03-05 NOTE — PATIENT INSTRUCTIONS
Patient would like to go via private vehicle to [facility] Emergency Department  Please go to the [facility] Emergency Department now for further evaluation and treatment- hospital address verified with the patient  Patient agreed to go immediately to the ED

## 2021-03-05 NOTE — ED PROVIDER NOTES
History  Chief Complaint   Patient presents with    Abdominal Cramping     C/o ABD cramping and nausea starting two days ago  Bloody stool for last month  36 y/o female presents today with lower abdominal cramping x 2 days and blood in her stool x 1 month  She has had blood in her stool previously and has been diagnosed with internal hemorrhoids  She was prescribed miralax previously but isn't using it now  She does say that her stools are hard and difficult to pass  Blood is always with stool, never spontaneously  Denies fever  Reports nausea but no vomiting  History provided by:  Patient  Abdominal Pain  Pain location:  Epigastric, LLQ and RLQ  Pain quality: aching, bloating and cramping    Pain radiates to:  Does not radiate  Pain severity:  Moderate  Onset quality:  Gradual  Duration:  2 days  Timing:  Constant  Progression:  Unchanged  Chronicity:  New  Context: not diet changes, not medication withdrawal, not sick contacts and not suspicious food intake    Relieved by:  None tried  Worsened by:  Nothing  Ineffective treatments:  None tried  Associated symptoms: nausea    Associated symptoms: no chest pain, no constipation, no fever, no hematuria, no shortness of breath and no vomiting    Risk factors: not elderly, has not had multiple surgeries and no recent hospitalization        Prior to Admission Medications   Prescriptions Last Dose Informant Patient Reported? Taking?    ALPRAZolam (XANAX PO)  Self Yes Yes   Sig: Take by mouth as needed   Sertraline HCl (ZOLOFT PO)  Self Yes Yes   Sig: Take 100 mg by mouth daily    busPIRone (BUSPAR) 5 mg tablet  Self Yes Yes   Sig: Take 5 mg by mouth 3 (three) times a day   famotidine (PEPCID) 20 mg tablet Not Taking at Unknown time Self Yes No   Sig: Take 20 mg by mouth 2 (two) times a day   norethindrone-ethinyl estradiol (JUNEL FE 1/20) 1-20 MG-MCG per tablet  Self Yes Yes   Sig: Take 1 tablet by mouth daily   ondansetron (ZOFRAN-ODT) 4 mg disintegrating tablet Not Taking at Unknown time  No No   Sig: Take 1 tablet (4 mg total) by mouth every 6 (six) hours as needed for nausea or vomiting for up to 10 doses   Patient not taking: Reported on 3/5/2021   ondansetron (ZOFRAN-ODT) 4 mg disintegrating tablet   No No   Sig: Take 1 tablet (4 mg total) by mouth every 6 (six) hours as needed for nausea or vomiting for up to 10 doses   pantoprazole (PROTONIX) 40 mg tablet Not Taking at Unknown time Self Yes No   Sig: Take 40 mg by mouth daily   pantoprazole (PROTONIX) 40 mg tablet   No No   Sig: Take 1 tablet (40 mg total) by mouth daily      Facility-Administered Medications: None       Past Medical History:   Diagnosis Date    Anemia     Anxiety     Asthma     Depression     Disease of thyroid gland     GERD (gastroesophageal reflux disease)     Varicella        Past Surgical History:   Procedure Laterality Date    COLONOSCOPY  2015    THYROIDECTOMY, PARTIAL  2017       Family History   Problem Relation Age of Onset    Hypertension Father     Arthritis Father     Anxiety disorder Father     Hyperlipidemia Father     Diabetes Maternal Grandfather     Breast cancer Paternal Grandmother     Cancer Paternal Grandmother         thyroid    Anxiety disorder Mother     Diabetes Mother     Depression Mother     Fibroids Mother     Infertility Mother     Sickle cell trait Brother     Heart murmur Brother     Heart murmur Son     Autism Brother     Bleeding Disorder Brother     Seizures Brother      I have reviewed and agree with the history as documented  E-Cigarette/Vaping     E-Cigarette/Vaping Substances     Social History     Tobacco Use    Smoking status: Never Smoker    Smokeless tobacco: Never Used   Substance Use Topics    Alcohol use: Yes     Comment: socially     Drug use: No       Review of Systems   Constitutional: Negative for fever  HENT: Negative for congestion  Eyes: Negative for visual disturbance     Respiratory: Negative for chest tightness and shortness of breath  Cardiovascular: Negative for chest pain  Gastrointestinal: Positive for abdominal pain and nausea  Negative for constipation and vomiting  Genitourinary: Negative for hematuria  Musculoskeletal: Negative for back pain  Neurological: Negative for dizziness and light-headedness  Psychiatric/Behavioral: Negative for confusion  Physical Exam  Physical Exam  Vitals signs and nursing note reviewed  Constitutional:       Appearance: She is well-developed  HENT:      Head: Normocephalic and atraumatic  Mouth/Throat:      Mouth: Mucous membranes are moist       Pharynx: Uvula midline  Tonsils: No tonsillar exudate  Eyes:      Pupils: Pupils are equal, round, and reactive to light  Neck:      Musculoskeletal: Normal range of motion and neck supple  Cardiovascular:      Rate and Rhythm: Normal rate and regular rhythm  Pulmonary:      Effort: Pulmonary effort is normal       Breath sounds: Normal breath sounds  Abdominal:      General: Bowel sounds are normal       Palpations: Abdomen is soft  Tenderness: There is abdominal tenderness (moderate, diffuse)  There is no guarding or rebound  Musculoskeletal:         General: No tenderness or deformity  Skin:     General: Skin is warm and dry  Capillary Refill: Capillary refill takes less than 2 seconds  Neurological:      General: No focal deficit present  Mental Status: She is alert and oriented to person, place, and time  Comments: Patient moving all extremities equally, no focal neuro deficits noted         Psychiatric:         Mood and Affect: Mood normal          Behavior: Behavior normal          Vital Signs  ED Triage Vitals   Temperature Pulse Respirations Blood Pressure SpO2   03/05/21 1404 03/05/21 1404 03/05/21 1404 03/05/21 1405 03/05/21 1404   98 6 °F (37 °C) 80 16 129/86 97 %      Temp Source Heart Rate Source Patient Position - Orthostatic VS BP Location FiO2 (%)   03/05/21 1404 03/05/21 1404 03/05/21 1404 03/05/21 1404 --   Oral Monitor Sitting Left arm       Pain Score       03/05/21 1404       7           Vitals:    03/05/21 1404 03/05/21 1405   BP:  129/86   Pulse: 80    Patient Position - Orthostatic VS: Sitting Sitting         Visual Acuity      ED Medications  Medications   ondansetron (ZOFRAN-ODT) dispersible tablet 4 mg (4 mg Oral Given 3/5/21 1409)   iohexol (OMNIPAQUE) 350 MG/ML injection (MULTI-DOSE) 100 mL (100 mL Intravenous Given 3/5/21 1515)       Diagnostic Studies  Results Reviewed     Procedure Component Value Units Date/Time    Urine Microscopic [[id number]]  (Abnormal) Collected: 03/05/21 1449    Lab Status: Final result Specimen: Urine, Clean Catch Updated: 03/05/21 1602     RBC, UA 1-2 /hpf      WBC, UA 2-4 /hpf      Epithelial Cells Moderate /hpf      Bacteria, UA Occasional /hpf     POCT pregnancy, urine [[id number]]  (Normal) Resulted: 03/05/21 1453    Lab Status: Final result Specimen: Urine Updated: 03/05/21 1453     EXT PREG TEST UR (Ref: Negative) NEGATIVE     Control valid    Urine Macroscopic, POC [[id number]]  (Abnormal) Collected: 03/05/21 1449    Lab Status: Final result Specimen: Urine Updated: 03/05/21 1451     Color, UA Yellow     Clarity, UA Slightly Cloudy     pH, UA 5 5     Leukocytes, UA Small     Nitrite, UA Negative     Protein, UA Negative mg/dl      Glucose, UA Negative mg/dl      Ketones, UA Negative mg/dl      Urobilinogen, UA 0 2 E U /dl      Bilirubin, UA Negative     Blood, UA Trace     Specific [place], UA 1 025    Narrative:      CLINITEK RESULT    Comprehensive metabolic panel [[id number]]  (Abnormal) Collected: 03/05/21 1419    Lab Status: Final result Specimen: Blood from Arm, Left Updated: 03/05/21 1443     Sodium 141 mmol/L      Potassium 3 9 mmol/L      Chloride 106 mmol/L      CO2 23 mmol/L      ANION GAP 12 mmol/L      BUN 11 mg/dL      Creatinine 0 95 mg/dL      Glucose 81 mg/dL      Calcium 9 3 mg/dL      AST 10 U/L      ALT 23 U/L      Alkaline Phosphatase 89 U/L      Total Protein 8 3 g/dL      Albumin 4 1 g/dL      Total Bilirubin 0 37 mg/dL      eGFR 94 ml/min/1 73sq m     Narrative:      [place] guidelines for Chronic Kidney Disease (CKD):     Stage 1 with normal or high GFR (GFR > 90 mL/min/1 73 square meters)    Stage 2 Mild CKD (GFR = 60-89 mL/min/1 73 square meters)    Stage 3A Moderate CKD (GFR = 45-59 mL/min/1 73 square meters)    Stage 3B Moderate CKD (GFR = 30-44 mL/min/1 73 square meters)    Stage 4 Severe CKD (GFR = 15-29 mL/min/1 73 square meters)    Stage 5 End Stage CKD (GFR <15 mL/min/1 73 square meters)  Note: GFR calculation is accurate only with a steady state creatinine    Lipase [[id number]]  (Normal) Collected: 03/05/21 1419    Lab Status: Final result Specimen: Blood from Arm, Left Updated: 03/05/21 1443     Lipase 79 u/L     CBC and differential [[id number]]  (Abnormal) Collected: 03/05/21 1419    Lab Status: Final result Specimen: Blood from Arm, Left Updated: 03/05/21 1425     WBC 11 29 Thousand/uL      RBC 4 65 Million/uL      Hemoglobin 13 7 g/dL      Hematocrit 41 3 %      MCV 89 fL      MCH 29 5 pg      MCHC 33 2 g/dL      RDW 11 9 %      MPV 9 8 fL      Platelets 663 Thousands/uL      nRBC 0 /100 WBCs      Neutrophils Relative 67 %      Immat GRANS % 0 %      Lymphocytes Relative 21 %      Monocytes Relative 8 %      Eosinophils Relative 3 %      Basophils Relative 1 %      Neutrophils Absolute 7 64 Thousands/µL      Immature Grans Absolute 0 03 Thousand/uL      Lymphocytes Absolute 2 37 Thousands/µL      Monocytes Absolute 0 85 Thousand/µL      Eosinophils Absolute 0 32 Thousand/µL      Basophils Absolute 0 08 Thousands/µL                  CT abdomen pelvis with contrast   Final Result by Ragini Noe MD (03/05 1536)      No acute inflammatory process detected in the abdomen or pelvis              Workstation performed: [id number] Procedures  Procedures         ED Course                                           MDM  Number of Diagnoses or Management Options  Blood in stool: new and requires workup  Constipation: new and requires workup  Gastritis: new and requires workup  Lower abdominal pain: new and requires workup  Diagnosis management comments: CT scan is negative for acute intraabdominal process  Hemoglobin is normal and vital signs are normal   Will change her pepcid to protonix, add bentyl, colace and zofran  [place] diet x 2 weeks and f/u with GI as outpatient  Amount and/or Complexity of Data Reviewed  Clinical lab tests: ordered and reviewed  Tests in the radiology section of CPT®: ordered and reviewed  Tests in the medicine section of CPT®: ordered and reviewed  Review and summarize past medical records: yes  Independent visualization of images, tracings, or specimens: yes    Risk of Complications, Morbidity, and/or Mortality  Presenting problems: high  Diagnostic procedures: high  Management options: high    Patient Progress  Patient progress: stable      Disposition  Final diagnoses:   Lower abdominal pain   Blood in stool   Constipation   Gastritis     Time reflects when diagnosis was documented in both MDM as applicable and the Disposition within this note     Time User Action Codes Description Comment    3/5/2021  3:46 PM Leela Gay Add [R10 30] Lower abdominal pain     3/5/2021  3:46 PM Leela Gay Add [K92 1] Blood in stool     3/5/2021  3:52 PM Leela Gay Add [K59 00] Constipation     3/5/2021  3:53 PM Leela Gay Add [K29 70] Gastritis     3/5/2021  4:04 PM Carmita Chang Add [R11 0] Nausea       ED Disposition     ED Disposition Condition Date/Time Comment    Discharge Stable Fri Mar 5, 2021  3:46 PM Celi Waller discharge to home/self care              Follow-up Information     Follow up With Specialties Details Why Contact Info Additional Information    Nan Rivera MD Family Medicine Schedule an appointment as soon as possible for a visit   [address]  [address] [facility]       Byron [facility] Schedule an appointment as soon as possible for a visit   [address] [address] [facility] [place], [place] [address] [place], [facility] [place], [place], [address]          Discharge Medication List as of 3/5/2021  3:54 PM      START taking these medications    Details   docusate sodium (COLACE) 100 mg capsule Take 1 capsule (100 mg total) by mouth every 12 (twelve) hours, Starting Fri 3/5/2021, Normal         CONTINUE these medications which have CHANGED    Details   pantoprazole (PROTONIX) 40 mg tablet Take 1 tablet (40 mg total) by mouth daily, Starting Fri 3/5/2021, Until Sun 4/4/2021, Normal         CONTINUE these medications which have NOT CHANGED    Details   ALPRAZolam (XANAX PO) Take by mouth as needed, Historical Med      busPIRone (BUSPAR) 5 mg tablet Take 5 mg by mouth 3 (three) times a day, Historical Med      norethindrone-ethinyl estradiol (JUNEL FE 1/20) 1-20 MG-MCG per tablet Take 1 tablet by mouth daily, Historical Med      Sertraline HCl (ZOLOFT PO) Take 100 mg by mouth daily , Historical Med      ondansetron (ZOFRAN-ODT) 4 mg disintegrating tablet Take 1 tablet (4 mg total) by mouth every 6 (six) hours as needed for nausea or vomiting for up to 10 doses, Starting Tue 1/7/2020, Normal         STOP taking these medications       famotidine (PEPCID) 20 mg tablet Comments:   Reason for Stopping:             No discharge procedures on file      PDMP Review     None          ED Provider  Electronically Signed by           Hardy Suarez DO  03/05/21 5161

## 2021-03-11 DIAGNOSIS — Z20.822 ENCOUNTER FOR SCREENING LABORATORY TESTING FOR COVID-19 VIRUS: ICD-10-CM

## 2021-03-11 PROCEDURE — U0003 INFECTIOUS AGENT DETECTION BY NUCLEIC ACID (DNA OR RNA); SEVERE ACUTE RESPIRATORY SYNDROME CORONAVIRUS 2 (SARS-COV-2) (CORONAVIRUS DISEASE [COVID-19]), AMPLIFIED PROBE TECHNIQUE, MAKING USE OF HIGH THROUGHPUT TECHNOLOGIES AS DESCRIBED BY CMS-2020-01-R: HCPCS | Performed by: FAMILY MEDICINE

## 2021-03-11 PROCEDURE — U0005 INFEC AGEN DETEC AMPLI PROBE: HCPCS | Performed by: FAMILY MEDICINE

## 2021-03-12 LAB — SARS-COV-2 RNA RESP QL NAA+PROBE: NEGATIVE

## 2021-04-14 ENCOUNTER — ANNUAL EXAM (OUTPATIENT)
Dept: OBGYN CLINIC | Facility: CLINIC | Age: 31
End: 2021-04-14
Payer: COMMERCIAL

## 2021-04-14 VITALS
WEIGHT: 207.4 LBS | DIASTOLIC BLOOD PRESSURE: 62 MMHG | BODY MASS INDEX: 35.41 KG/M2 | SYSTOLIC BLOOD PRESSURE: 115 MMHG | HEIGHT: 64 IN

## 2021-04-14 DIAGNOSIS — Z31.9 PATIENT DESIRES PREGNANCY: ICD-10-CM

## 2021-04-14 DIAGNOSIS — N93.9 ABNORMAL UTERINE BLEEDING (AUB): ICD-10-CM

## 2021-04-14 DIAGNOSIS — Z01.419 ENCOUNTER FOR ANNUAL ROUTINE GYNECOLOGICAL EXAMINATION: Primary | ICD-10-CM

## 2021-04-14 DIAGNOSIS — N94.3 PMS (PREMENSTRUAL SYNDROME): ICD-10-CM

## 2021-04-14 PROCEDURE — 99395 PREV VISIT EST AGE 18-39: CPT | Performed by: STUDENT IN AN ORGANIZED HEALTH CARE EDUCATION/TRAINING PROGRAM

## 2021-04-14 RX ORDER — SODIUM PICOSULFATE, MAGNESIUM OXIDE, AND ANHYDROUS CITRIC ACID 10; 3.5; 12 MG/160ML; G/160ML; G/160ML
LIQUID ORAL
COMMUNITY
Start: 2021-03-19

## 2021-04-14 RX ORDER — SERTRALINE HYDROCHLORIDE 100 MG/1
TABLET, FILM COATED ORAL
COMMUNITY
Start: 2021-03-13

## 2021-04-14 RX ORDER — FLUTICASONE PROPIONATE 220 UG/1
AEROSOL, METERED RESPIRATORY (INHALATION)
COMMUNITY
Start: 2021-03-17

## 2021-04-14 RX ORDER — NORETHINDRONE ACETATE AND ETHINYL ESTRADIOL 1; 20 MG/1; UG/1
1 TABLET ORAL DAILY
COMMUNITY
Start: 2021-03-21

## 2021-04-14 NOTE — ASSESSMENT & PLAN NOTE
Plan for continuous cOCP  Already on Zoloft, reviewed possibility of increasing for PMS, will consider

## 2021-04-14 NOTE — ASSESSMENT & PLAN NOTE
Interested in the next two-three years  Does know has sickle cell trait and so does boyfriend, had amniocentesis in previous pregnancy - son is negative for trait!   Plan to start PNV  Reviewed efforts to get BMI down below 30, walking, healthy eating

## 2021-04-14 NOTE — PROGRESS NOTES
Established patient annual exam - Gynecology    Chief complaint: annual exam    Chief Complaint   Patient presents with    Gynecologic Exam     annual exam  LMP: 3/14  last pap: 19-neg  declined std testing      Assessment/Plan     35 y o  Emory Mckenzie with normal annual gynecologic examination  Problem List Items Addressed This Visit        Genitourinary    Abnormal uterine bleeding (AUB)     More regular on Junel  Increased cramping however  Also moodiness during period  Reviewed options to skip placebo week (risk of break through bleeding), Aleve scheduled 1-2 prior to period and for days 1-2 of period            Other    PMS (premenstrual syndrome)     Plan for continuous cOCP  Already on Zoloft, reviewed possibility of increasing for PMS, will consider         Patient desires pregnancy     Interested in the next two-three years  Does know has sickle cell trait and so does boyfriend, had amniocentesis in previous pregnancy - son is negative for trait! Plan to start PNV  Reviewed efforts to get BMI down below 30, walking, healthy eating           Other Visit Diagnoses     Encounter for annual routine gynecological examination    -  Primary        Normal findings on routine gynecologic exam today  Reviewed annual screening mammogram and annual clinical breast exam     Discussed ASCCP guidelines and Pap smear with cotesting frequency, up to date today  Next 2022  STD/STI testing offered, declined today - reports low risk  Plan to continue current contraceptive method per patient preference (Petty Gould)  Encouraged daily calcium and vitamin D intake was well as weight bearing exercise daily for bone health  Follow up PRN or for annual exam   --------------------------------------------------------  History of Present Illness     Deb Diaz is a 35 y o  female  Patient's last menstrual period was 2021 (approximate)  Petty Gould for contraception who presents for annual examination      Concerns today: periods more regular on Junel however increased cramping  Does not irritable during period    New updates: mom still hasn't investigated genetic testing for strong family history of breast cancer    Got Arleene Geoff and Arleene Geoff vaccine >1 month ago, doing well, no concerns, on the lookout for VTE    Health maintenance  Last pap smear: 2019 NILM   Bone density scan: n/a  Last mammogram: Not on file n/a  Last colonoscopy: Not on file results pending for gastritis  HPV vaccination?: yes    GYN History  Menarche age 7  Patient's last menstrual period was 2021 (approximate)    Frequency q28 days lasting 5 days  Regular periods  No history abnormal Pap smears  No history of cryotherapy, LEEP, or cold knife cone of the cervix    Sexually active? yes  Current sexual partner(s): boyfriend  History of STD: no  Interested in STD screening today? no  Concerns about sex: no    Occupation: works for the school district    OB History    Para Term  AB Living   1 1 1     1   SAB TAB Ectopic Multiple Live Births                  # Outcome Date GA Lbr Kushal/2nd Weight Sex Delivery Anes PTL Lv   1 Term              # 1 - Date: None, Sex: None, Weight: None, GA: None, Delivery: None, Apgar1: None, Apgar5: None, Living: None, Birth Comments: None     at 38 weeks  Had amniocentesis for sickle cell trait of her and boyfriend    Past Medical History:   Diagnosis Date    Anemia     Anxiety     Asthma     Depression     Disease of thyroid gland     GERD (gastroesophageal reflux disease)     Varicella        Past Surgical History:   Procedure Laterality Date    COLONOSCOPY  2015    THYROIDECTOMY, PARTIAL  2017     Family History   Problem Relation Age of Onset    Hypertension Father     Arthritis Father     Anxiety disorder Father     Hyperlipidemia Father     Diabetes Maternal Grandfather     Breast cancer Paternal Grandmother     Cancer Paternal Grandmother         thyroid    Anxiety disorder Mother     Diabetes Mother     Depression Mother     Fibroids Mother     Infertility Mother     Sickle cell trait Brother     Heart murmur Brother     Heart murmur Son     Autism Brother     Bleeding Disorder Brother     Seizures Brother      Social History   Social History     Substance and Sexual Activity   Alcohol Use Yes    Comment: socially      Social History     Substance and Sexual Activity   Drug Use No     Social History     Tobacco Use   Smoking Status Never Smoker   Smokeless Tobacco Never Used       Current Outpatient Medications:     ALPRAZolam (XANAX PO), Take by mouth as needed, Disp: , Rfl:     busPIRone (BUSPAR) 5 mg tablet, Take 5 mg by mouth 3 (three) times a day, Disp: , Rfl:     dicyclomine (BENTYL) 20 mg tablet, Take 1 tablet (20 mg total) by mouth every 6 (six) hours as needed (ABDOMINAL CRAMPING) for up to 20 doses, Disp: 20 tablet, Rfl: 0    docusate sodium (COLACE) 100 mg capsule, Take 1 capsule (100 mg total) by mouth every 12 (twelve) hours, Disp: 60 capsule, Rfl: 0    Flovent  MCG/ACT inhaler, TAKE 1 PUFF BY MOUTH TWICE A DAY, Disp: , Rfl:     Junel 1/20 1-20 MG-MCG per tablet, Take 1 tablet by mouth daily, Disp: , Rfl:     sertraline (ZOLOFT) 100 mg tablet, TAKE ONE TABLET (100 MG) BY MOUTH DAILY, Disp: , Rfl:     Clenpiq 10-3 5-12 MG-GM -GM/160ML SOLN, FOLLOW DIRECTIONS ON SHEET GIVEN, Disp: , Rfl:     norethindrone-ethinyl estradiol (JUNEL FE 1/20) 1-20 MG-MCG per tablet, Take 1 tablet by mouth daily, Disp: , Rfl:     ondansetron (ZOFRAN-ODT) 4 mg disintegrating tablet, Take 1 tablet (4 mg total) by mouth every 6 (six) hours as needed for nausea or vomiting for up to 10 doses (Patient not taking: Reported on 4/14/2021), Disp: 10 tablet, Rfl: 0    pantoprazole (PROTONIX) 40 mg tablet, Take 1 tablet (40 mg total) by mouth daily, Disp: 30 tablet, Rfl: 0    Sertraline HCl (ZOLOFT PO), Take 100 mg by mouth daily , Disp: , Rfl:   Allergies   Allergen Reactions  Amoxicillin-Pot Clavulanate Vomiting and GI Intolerance    Bee Pollen Itching    Pollen Extract Itching     REVIEW OF SYSTEMS  CONSTITUTIONAL:  No weight loss, fever, chills, weakness  HEENT: No visual loss, blurred vision  SKIN: No rash or itching  CARDIOVASCULAR: No chest pain, chest pressure, or chest discomfort  RESPIRATORY: No shortness of breath, cough or sputum  GASTROINTESTINAL: No nausea, emesis, or diarrhea  NEUROLOGICAL: No headache, dizziness, syncope, paralysis  MUSCUOSKELETAL: No muscle, back pain, joint stiffness or bruising  INFECTIOUS: No fever, chills  PSYCHIATRIC: No disorder of thought or mood  HEMATOLOGIC: No easy bruising or bleeding  GYN: Heavy menses bleeding  No involuntary urine loss  No pain with intercourse  No vaginal dryness    Objective   Vitals: Blood pressure 115/62, height 5' 4" (1 626 m), weight 94 1 kg (207 lb 6 4 oz), last menstrual period 03/14/2021, unknown if currently breastfeeding  Body mass index is 35 6 kg/m²  General: NAD, AAOx3  Heart: RRR  Lungs: CTAB  Neck: supple, no thyromegaly or thyroid nodules appreciated  Breast: nipples everted bilaterally, no skin changes  No dimpling, redness, or erythema  No breast masses or axillary masses bilaterally  Abdomen: soft, non-distended, non tender to palpation  Extremities: non-tender to palpation  Speculum exam: Normal appearing external genitalia, normal hair distribution  Urethra well-suspended, no clitoromegaly noted  Vagina pink moist well-rugated  physiologic discharge noted  Cervix without lesion, parous appearing, ectropion  No blood in vaginal vault    Pelvic exam: no cervical motion tenderness  No adnexal masses or tenderness  retroverted uterus, normal size  Lab Results:   No visits with results within 1 Day(s) from this visit     Latest known visit with results is:   Orders Only on 03/11/2021   Component Date Value    SARS-CoV-2 03/11/2021 Negative

## 2021-04-14 NOTE — ASSESSMENT & PLAN NOTE
More regular on Junel  Increased cramping however  Also moodiness during period  Reviewed options to skip placebo week (risk of break through bleeding), Aleve scheduled 1-2 prior to period and for days 1-2 of period

## 2021-04-15 ENCOUNTER — OFFICE VISIT (OUTPATIENT)
Dept: GASTROENTEROLOGY | Facility: CLINIC | Age: 31
End: 2021-04-15
Payer: COMMERCIAL

## 2021-04-15 VITALS
SYSTOLIC BLOOD PRESSURE: 114 MMHG | DIASTOLIC BLOOD PRESSURE: 70 MMHG | TEMPERATURE: 99.1 F | WEIGHT: 209 LBS | HEIGHT: 64 IN | BODY MASS INDEX: 35.68 KG/M2 | HEART RATE: 73 BPM

## 2021-04-15 DIAGNOSIS — R11.0 NAUSEA: ICD-10-CM

## 2021-04-15 DIAGNOSIS — K58.1 IRRITABLE BOWEL SYNDROME WITH CONSTIPATION: Primary | ICD-10-CM

## 2021-04-15 DIAGNOSIS — K21.9 GASTROESOPHAGEAL REFLUX DISEASE WITHOUT ESOPHAGITIS: ICD-10-CM

## 2021-04-15 PROCEDURE — 99204 OFFICE O/P NEW MOD 45 MIN: CPT | Performed by: PHYSICIAN ASSISTANT

## 2021-04-15 RX ORDER — DICYCLOMINE HCL 20 MG
20 TABLET ORAL EVERY 6 HOURS PRN
Qty: 60 TABLET | Refills: 2
Start: 2021-04-15 | End: 2021-05-04

## 2021-04-15 RX ORDER — ONDANSETRON 4 MG/1
4 TABLET, ORALLY DISINTEGRATING ORAL EVERY 6 HOURS PRN
Qty: 30 TABLET | Refills: 2 | Status: SHIPPED | OUTPATIENT
Start: 2021-04-15 | End: 2022-01-22

## 2021-04-15 RX ORDER — DICYCLOMINE HCL 20 MG
20 TABLET ORAL EVERY 6 HOURS PRN
Qty: 60 TABLET | Refills: 2 | Status: SHIPPED | OUTPATIENT
Start: 2021-04-15 | End: 2021-04-15 | Stop reason: SDUPTHER

## 2021-04-15 NOTE — PROGRESS NOTES
[facility] - Outpatient Consultation  Jhon Nguyen 35 y o  female MRN: [id number]  Encounter: [id number]          ASSESSMENT AND PLAN:      1  Irritable bowel syndrome with constipation  I believe she has constipation predominant irritable bowel syndrome given her longstanding constipation, abdominal pain and discomfort  She had recent colonoscopy which was normal including random colon biopsies  She had normal TSH  She does have hemorrhoids which explains her rectal bleeding  She is not anemic and not losing weight  I recommend starting MiraLax 1 capsule daily  She can increase to 2 times daily if needed  Discussed high-fiber diet and goal of 64 oz of water daily  She can use Bentyl as needed for abdominal pain  Avoid dairy if possible  - Celiac Disease Antibody Profile; Future  - dicyclomine (BENTYL) 20 mg tablet; Take 1 tablet (20 mg total) by mouth every 6 (six) hours as needed (ABDOMINAL CRAMPING)  Dispense: 60 tablet; Refill: 2    2  Nausea  I suspect her nausea may be related to constipation  It seems unlikely related to symptomatic cholelithiasis or gastroparesis given that there is no association with eating  She can take Zofran as needed  - ondansetron (ZOFRAN-ODT) 4 mg disintegrating tablet; Take 1 tablet (4 mg total) by mouth every 6 (six) hours as needed for nausea or vomiting  Dispense: 30 tablet; Refill: 2    3  Gastroesophageal reflux disease without esophagitis  She reports frequent heartburn which is controlled with PPI  Recent EGD showed no evidence of esophagitis  She may continue Protonix 40 mg once daily for now  I gave her handout on dietary and lifestyle recommendations for reflux  Follow-up in 3 months  ______________________________________________________________________    HPI:    27-year-old female with history of partial thyroidectomy and Hashimoto's thyroiditis presenting for follow-up of chronic constipation, abdominal pain, nausea, GERD    She has had these symptoms chronically for years  She had an EGD and colonoscopy in [place] in 2015 which was significant for gastritis and hemorrhoids  She gets flare ups about 1 time per year and goes to the ER with abdominal pain, vomiting and/or diarrhea  She was in the ER in March 2021 due to abdominal cramping and blood in her stool  She had normal blood work including CBC, CMP, lipase and CT abdomen pelvis with IV contrast was unremarkable  She followed up with Dr Jose Flowers with Colorectal surgery  And had another EGD and colonoscopy on 04/06/2021  Her EGD was completely normal including gastric biopsies  Her colon appeared normal and random colon biopsies were negative for microscopic colitis  She did have large external and internal hemorrhoids  She has follow-up scheduled with Dr Jose Flowers next week  Her biggest complaint is nausea, heartburn, and constipation  Her nausea occurs randomly and is not associated with eating  She has good days and bad days  She does have frequent heartburn after eating specific trigger foods  She gets abdominal "aches" and lower abdominal cramping this is not associated with eating  The pain does seem to worsen if she has not had a bowel movement for several days  She can go up to 1 week without a bowel movement  She has tried some over-the-counter stool softeners and suppositories without relief  She has not tried MiraLax yet  She does see blood on the toilet paper and in her stool  She has never smoked tobacco  She has used marijuana in the past but not regularly  REVIEW OF SYSTEMS:    CONSTITUTIONAL: Denies any fever, chills, rigors, and weight loss  HEENT: No earache or tinnitus  Denies hearing loss or visual disturbances  CARDIOVASCULAR: No chest pain or palpitations  RESPIRATORY: Denies any cough, hemoptysis, shortness of breath or dyspnea on exertion  GASTROINTESTINAL: As noted in the History of Present Illness     GENITOURINARY: No problems with urination  Denies any hematuria or dysuria  NEUROLOGIC: No dizziness or vertigo, denies headaches  MUSCULOSKELETAL: Denies any muscle or joint pain  SKIN: Denies skin rashes or itching  ENDOCRINE: Denies excessive thirst  Denies intolerance to heat or cold  PSYCHOSOCIAL: Denies depression or anxiety  Denies any recent memory loss         Historical Information   Past Medical History:   Diagnosis Date    Anemia     Anxiety     Asthma     Depression     Disease of thyroid gland     GERD (gastroesophageal reflux disease)     Varicella      Past Surgical History:   Procedure Laterality Date    COLONOSCOPY  2015    EGD  04/06/2021    THYROIDECTOMY, PARTIAL  2017     Social History   Social History     Substance and Sexual Activity   Alcohol Use Yes    Comment: socially      Social History     Substance and Sexual Activity   Drug Use No     Social History     Tobacco Use   Smoking Status Never Smoker   Smokeless Tobacco Never Used     Family History   Problem Relation Age of Onset    Hypertension Father     Arthritis Father     Anxiety disorder Father     Hyperlipidemia Father     Diabetes Maternal Grandfather     Breast cancer Paternal Grandmother     Cancer Paternal Grandmother         thyroid    Anxiety disorder Mother     Diabetes Mother     Depression Mother     Fibroids Mother     Infertility Mother     Sickle cell trait Brother     Heart murmur Brother     Heart murmur Son     Autism Brother     Bleeding Disorder Brother     Seizures Brother        Meds/Allergies       Current Outpatient Medications:     ALPRAZolam (XANAX PO)    busPIRone (BUSPAR) 5 mg tablet    Clenpiq 10-3 5-12 MG-GM -GM/160ML SOLN    dicyclomine (BENTYL) 20 mg tablet    docusate sodium (COLACE) 100 mg capsule    Flovent  MCG/ACT inhaler    Junel 1/20 1-20 MG-MCG per tablet    norethindrone-ethinyl estradiol (JUNEL FE 1/20) 1-20 MG-MCG per tablet    ondansetron (ZOFRAN-ODT) 4 mg disintegrating tablet    pantoprazole (PROTONIX) 40 mg tablet    sertraline (ZOLOFT) 100 mg tablet    Sertraline HCl (ZOLOFT PO)    Allergies   Allergen Reactions    Amoxicillin-Pot Clavulanate Vomiting and GI Intolerance    Bee Pollen Itching    Pollen Extract Itching           Objective     Blood pressure 114/70, pulse 73, temperature 99 1 °F (37 3 °C), temperature source Tympanic, height 5' 4" (1 626 m), weight 94 8 kg (209 lb), unknown if currently breastfeeding  Body mass index is 35 87 kg/m²  PHYSICAL EXAM:      General Appearance:   Alert, cooperative, no distress   HEENT:   Normocephalic, atraumatic, anicteric      Neck:  Supple, symmetrical, trachea midline   Lungs:   Clear to auscultation bilaterally   Heart[de-identified]   Regular rate and rhythm   Abdomen:   Soft, non-tender, non-distended; normal bowel sounds   Genitalia:   Deferred    Rectal:   Deferred    Extremities:  No cyanosis, clubbing or edema    Pulses:  2+ and symmetric    Skin:  No jaundice, rashes, or lesions    Lymph nodes:  No palpable cervical lymphadenopathy        Lab Results:   No visits with results within 1 Day(s) from this visit  Latest known visit with results is:   Orders Only on 03/11/2021   Component Date Value    SARS-CoV-2 03/11/2021 Negative          Radiology Results:   No results found      Answers for HPI/ROS submitted by the patient on 4/10/2021   Abdominal pain  Progression since onset: unchanged  Pain location: epigastric region, suprapubic region  Pain - numeric: 5/10  Pain quality: aching, cramping  Radiates to: epigastric region  anorexia: No  arthralgias: Yes  belching: No  constipation: Yes  diarrhea: No  dysuria: No  fever: No  flatus: Yes  frequency: No  headaches: Yes  hematochezia: Yes  hematuria: No  melena: No  myalgias: Yes  nausea: Yes  weight loss: No  vomiting: No  Aggravated by: being still, drinking alcohol, eating  Relieved by: activity, certain positions, passing flatus, recumbency  Diagnostic workup: CT scan, GI consult, lower endoscopy, upper endoscopy

## 2021-04-15 NOTE — PATIENT INSTRUCTIONS
Start MiraLAX 1 capful daily  You can increase to 2 times daily  If you're taking MiraLAX 2-3 times daily, and you're still unhappy, we can discuss prescription options  High fiber diet  Goal 64 oz of water daily  You can use Bentyl as needed for abdominal pain  You can use Zofran as needed for nausea  Continue Protonix daily  Avoid dairy if possible  You can follow-up with colorectal surgeon about hemorrhoids    High Fiber Diet   AMBULATORY CARE:   A high-fiber diet  includes foods that have a high amount of fiber  Fiber is the part of fruits, vegetables, and grains that is not broken down by your body  Fiber keeps your bowel movements regular  Fiber can also help lower your cholesterol level, control blood sugar in people with diabetes, and relieve constipation  Fiber can also help you control your weight because it helps you feel full faster  Most adults should eat 25 to 35 grams of fiber each day  Talk to your dietitian or healthcare provider about the amount of fiber you need  Good sources of fiber:       · Foods with at least 4 grams of fiber per serving:      ? ? to ½ cup of high-fiber cereal (check the nutrition label on the box)    ? ½ cup of blackberries or raspberries    ? 4 dried prunes    ? 1 cooked artichoke    ? ½ cup of cooked legumes, such as lentils, or red, kidney, and cummings beans    · Foods with 1 to 3 grams of fiber per serving:      ? 1 slice of whole-wheat, pumpernickel, or rye bread    ? ½ cup of cooked brown rice    ? 4 whole-wheat crackers    ? 1 cup of oatmeal    ? ½ cup of cereal with 1 to 3 grams of fiber per serving (check the nutrition label on the box)    ? 1 small piece of fruit, such as an apple, banana, pear, kiwi, or orange    ? 3 dates    ? ½ cup of canned apricots, fruit cocktail, peaches, or pears    ?  ½ cup of raw or cooked vegetables, such as carrots, cauliflower, cabbage, spinach, squash, or corn  Ways that you can increase fiber in your diet:   · Choose brown or wild rice instead of white rice  · Use whole wheat flour in recipes instead of white or all-purpose flour  · Add beans and peas to casseroles or soups  · Choose fresh fruit and vegetables with peels or skins on instead of juices  Other diet guidelines to follow:   · Add fiber to your diet slowly  You may have abdominal discomfort, bloating, and gas if you add fiber to your diet too quickly  · Drink plenty of liquids as you add fiber to your diet  You may have nausea or develop constipation if you do not drink enough water  Ask how much liquid to drink each day and which liquids are best for you  © Copyright [address] Information is for End User's use only and may not be sold, redistributed or otherwise used for commercial purposes  All illustrations and images included in CareNotes® are the copyrighted property of [employer]  or Hermilo Plasencia  The above information is an  only  It is not intended as medical advice for individual conditions or treatments  Talk to your doctor, nurse or pharmacist before following any medical regimen to see if it is safe and effective for you

## 2021-04-15 NOTE — LETTER
April 15, 2021     Kathrin Garza MD  [address] [place] [address]    Patient: Christiano Mota   YOB: 1990   Date of Visit: 4/15/2021       Dear Dr Matt Hinson: Thank you for referring Scot Patient to me for evaluation  Below are my notes for this consultation  If you have questions, please do not hesitate to call me  I look forward to following your patient along with you  Sincerely,        Georgia Grant PA-C        CC: No Recipients  Georgia Grant PA-C  4/15/2021 12:49 PM  Sign when Signing Visit  [facility] - Outpatient Consultation  Christiano Mota 35 y o  female MRN: [id number]  Encounter: [id number]          ASSESSMENT AND PLAN:      1  Irritable bowel syndrome with constipation  I believe she has constipation predominant irritable bowel syndrome given her longstanding constipation, abdominal pain and discomfort  She had recent colonoscopy which was normal including random colon biopsies  She had normal TSH  She does have hemorrhoids which explains her rectal bleeding  She is not anemic and not losing weight  I recommend starting MiraLax 1 capsule daily  She can increase to 2 times daily if needed  Discussed high-fiber diet and goal of 64 oz of water daily  She can use Bentyl as needed for abdominal pain  Avoid dairy if possible  - Celiac Disease Antibody Profile; Future  - dicyclomine (BENTYL) 20 mg tablet; Take 1 tablet (20 mg total) by mouth every 6 (six) hours as needed (ABDOMINAL CRAMPING)  Dispense: 60 tablet; Refill: 2    2  Nausea  I suspect her nausea may be related to constipation  It seems unlikely related to symptomatic cholelithiasis or gastroparesis given that there is no association with eating  She can take Zofran as needed  - ondansetron (ZOFRAN-ODT) 4 mg disintegrating tablet; Take 1 tablet (4 mg total) by mouth every 6 (six) hours as needed for nausea or vomiting  Dispense: 30 tablet; Refill: 2    3  Gastroesophageal reflux disease without esophagitis  She reports frequent heartburn which is controlled with PPI  Recent EGD showed no evidence of esophagitis  She may continue Protonix 40 mg once daily for now  I gave her handout on dietary and lifestyle recommendations for reflux  Follow-up in 3 months  ______________________________________________________________________    HPI:    69-year-old female with history of partial thyroidectomy and Hashimoto's thyroiditis presenting for follow-up of chronic constipation, abdominal pain, nausea, GERD  She has had these symptoms chronically for years  She had an EGD and colonoscopy in [place] in 2015 which was significant for gastritis and hemorrhoids  She gets flare ups about 1 time per year and goes to the ER with abdominal pain, vomiting and/or diarrhea  She was in the ER in March 2021 due to abdominal cramping and blood in her stool  She had normal blood work including CBC, CMP, lipase and CT abdomen pelvis with IV contrast was unremarkable  She followed up with Dr Georges Christopher with Colorectal surgery  And had another EGD and colonoscopy on 04/06/2021  Her EGD was completely normal including gastric biopsies  Her colon appeared normal and random colon biopsies were negative for microscopic colitis  She did have large external and internal hemorrhoids  She has follow-up scheduled with Dr Georges Christopher next week  Her biggest complaint is nausea, heartburn, and constipation  Her nausea occurs randomly and is not associated with eating  She has good days and bad days  She does have frequent heartburn after eating specific trigger foods  She gets abdominal "aches" and lower abdominal cramping this is not associated with eating  The pain does seem to worsen if she has not had a bowel movement for several days  She can go up to 1 week without a bowel movement  She has tried some over-the-counter stool softeners and suppositories without relief   She has not tried MiraLax yet  She does see blood on the toilet paper and in her stool  She has never smoked tobacco  She has used marijuana in the past but not regularly  REVIEW OF SYSTEMS:    CONSTITUTIONAL: Denies any fever, chills, rigors, and weight loss  HEENT: No earache or tinnitus  Denies hearing loss or visual disturbances  CARDIOVASCULAR: No chest pain or palpitations  RESPIRATORY: Denies any cough, hemoptysis, shortness of breath or dyspnea on exertion  GASTROINTESTINAL: As noted in the History of Present Illness  GENITOURINARY: No problems with urination  Denies any hematuria or dysuria  NEUROLOGIC: No dizziness or vertigo, denies headaches  MUSCULOSKELETAL: Denies any muscle or joint pain  SKIN: Denies skin rashes or itching  ENDOCRINE: Denies excessive thirst  Denies intolerance to heat or cold  PSYCHOSOCIAL: Denies depression or anxiety  Denies any recent memory loss         Historical Information   Past Medical History:   Diagnosis Date    Anemia     Anxiety     Asthma     Depression     Disease of thyroid gland     GERD (gastroesophageal reflux disease)     Varicella      Past Surgical History:   Procedure Laterality Date    COLONOSCOPY  2015    EGD  04/06/2021    THYROIDECTOMY, PARTIAL  2017     Social History   Social History     Substance and Sexual Activity   Alcohol Use Yes    Comment: socially      Social History     Substance and Sexual Activity   Drug Use No     Social History     Tobacco Use   Smoking Status Never Smoker   Smokeless Tobacco Never Used     Family History   Problem Relation Age of Onset    Hypertension Father     Arthritis Father     Anxiety disorder Father     Hyperlipidemia Father     Diabetes Maternal Grandfather     Breast cancer Paternal Grandmother     Cancer Paternal Grandmother         thyroid    Anxiety disorder Mother     Diabetes Mother     Depression Mother     Fibroids Mother     Infertility Mother     Sickle cell trait Brother  Heart murmur Brother     Heart murmur Son     Autism Brother     Bleeding Disorder Brother     Seizures Brother        Meds/Allergies       Current Outpatient Medications:     ALPRAZolam (XANAX PO)    busPIRone (BUSPAR) 5 mg tablet    Clenpiq 10-3 5-12 MG-GM -GM/160ML SOLN    dicyclomine (BENTYL) 20 mg tablet    docusate sodium (COLACE) 100 mg capsule    Flovent  MCG/ACT inhaler    Junel 1/20 1-20 MG-MCG per tablet    norethindrone-ethinyl estradiol (JUNEL FE 1/20) 1-20 MG-MCG per tablet    ondansetron (ZOFRAN-ODT) 4 mg disintegrating tablet    pantoprazole (PROTONIX) 40 mg tablet    sertraline (ZOLOFT) 100 mg tablet    Sertraline HCl (ZOLOFT PO)    Allergies   Allergen Reactions    Amoxicillin-Pot Clavulanate Vomiting and GI Intolerance    Bee Pollen Itching    Pollen Extract Itching           Objective     Blood pressure 114/70, pulse 73, temperature 99 1 °F (37 3 °C), temperature source Tympanic, height 5' 4" (1 626 m), weight 94 8 kg (209 lb), unknown if currently breastfeeding  Body mass index is 35 87 kg/m²  PHYSICAL EXAM:      General Appearance:   Alert, cooperative, no distress   HEENT:   Normocephalic, atraumatic, anicteric      Neck:  Supple, symmetrical, trachea midline   Lungs:   Clear to auscultation bilaterally   Heart[de-identified]   Regular rate and rhythm   Abdomen:   Soft, non-tender, non-distended; normal bowel sounds   Genitalia:   Deferred    Rectal:   Deferred    Extremities:  No cyanosis, clubbing or edema    Pulses:  2+ and symmetric    Skin:  No jaundice, rashes, or lesions    Lymph nodes:  No palpable cervical lymphadenopathy        Lab Results:   No visits with results within 1 Day(s) from this visit  Latest known visit with results is:   Orders Only on 03/11/2021   Component Date Value    SARS-CoV-2 03/11/2021 Negative          Radiology Results:   No results found      Answers for HPI/ROS submitted by the patient on 4/10/2021   Abdominal pain  Progression since onset: unchanged  Pain location: epigastric region, suprapubic region  Pain - numeric: 5/10  Pain quality: aching, cramping  Radiates to: epigastric region  anorexia: No  arthralgias: Yes  belching: No  constipation: Yes  diarrhea: No  dysuria: No  fever: No  flatus: Yes  frequency: No  headaches: Yes  hematochezia: Yes  hematuria: No  melena: No  myalgias: Yes  nausea: Yes  weight loss: No  vomiting: No  Aggravated by: being still, drinking alcohol, eating  Relieved by: activity, certain positions, passing flatus, recumbency  Diagnostic workup: CT scan, GI consult, lower endoscopy, upper endoscopy

## 2021-04-23 LAB
IGA SERPL-MCNC: 198 MG/DL (ref 47–310)
TTG IGA SER-ACNC: 1 U/ML

## 2021-05-04 DIAGNOSIS — K58.1 IRRITABLE BOWEL SYNDROME WITH CONSTIPATION: ICD-10-CM

## 2021-05-04 RX ORDER — DICYCLOMINE HCL 20 MG
20 TABLET ORAL EVERY 6 HOURS PRN
Qty: 90 TABLET | Refills: 3 | Status: SHIPPED | OUTPATIENT
Start: 2021-05-04

## 2021-06-30 ENCOUNTER — OFFICE VISIT (OUTPATIENT)
Dept: GASTROENTEROLOGY | Facility: CLINIC | Age: 31
End: 2021-06-30
Payer: COMMERCIAL

## 2021-06-30 VITALS
BODY MASS INDEX: 35.61 KG/M2 | DIASTOLIC BLOOD PRESSURE: 70 MMHG | TEMPERATURE: 98.8 F | HEART RATE: 73 BPM | SYSTOLIC BLOOD PRESSURE: 110 MMHG | WEIGHT: 208.6 LBS | HEIGHT: 64 IN

## 2021-06-30 DIAGNOSIS — K58.1 IRRITABLE BOWEL SYNDROME WITH CONSTIPATION: ICD-10-CM

## 2021-06-30 DIAGNOSIS — R11.0 NAUSEA: Primary | ICD-10-CM

## 2021-06-30 DIAGNOSIS — R10.10 PAIN OF UPPER ABDOMEN: ICD-10-CM

## 2021-06-30 PROCEDURE — 99214 OFFICE O/P EST MOD 30 MIN: CPT | Performed by: INTERNAL MEDICINE

## 2021-07-08 ENCOUNTER — HOSPITAL ENCOUNTER (OUTPATIENT)
Dept: ULTRASOUND IMAGING | Facility: HOSPITAL | Age: 31
Discharge: HOME | End: 2021-07-08
Attending: INTERNAL MEDICINE
Payer: COMMERCIAL

## 2021-07-08 DIAGNOSIS — R10.10 PAIN OF UPPER ABDOMEN: ICD-10-CM

## 2021-07-08 PROCEDURE — 76705 ECHO EXAM OF ABDOMEN: CPT

## 2021-07-30 ENCOUNTER — TELEPHONE (OUTPATIENT)
Dept: GASTROENTEROLOGY | Facility: CLINIC | Age: 31
End: 2021-07-30

## 2021-07-30 NOTE — TELEPHONE ENCOUNTER
I called the insurance spoke to [employer]  Prior authorization for the Gastric Emptying study is not needed through [employer]  Case # is [id number]  I called Pamela Mazariegos secondary insurance coverage spoke to [employer] rep  Prior authorization is not needed  Telephone call reference # is 7/30/2021

## 2021-08-05 NOTE — TELEPHONE ENCOUNTER
Gastric Emptying Study was approved through [address]  I spoke to [employer] rep  Approval # [id number]  Valid 8/4/21 to 10/3/21

## 2021-08-06 ENCOUNTER — OFFICE VISIT (OUTPATIENT)
Dept: URGENT CARE | Facility: CLINIC | Age: 31
End: 2021-08-06
Payer: COMMERCIAL

## 2021-08-06 ENCOUNTER — HOSPITAL ENCOUNTER (OUTPATIENT)
Dept: RADIOLOGY | Facility: HOSPITAL | Age: 31
Discharge: HOME | End: 2021-08-06
Attending: INTERNAL MEDICINE
Payer: COMMERCIAL

## 2021-08-06 VITALS
HEART RATE: 78 BPM | RESPIRATION RATE: 16 BRPM | HEIGHT: 64 IN | BODY MASS INDEX: 35.68 KG/M2 | TEMPERATURE: 96.8 F | OXYGEN SATURATION: 99 % | DIASTOLIC BLOOD PRESSURE: 68 MMHG | WEIGHT: 209 LBS | SYSTOLIC BLOOD PRESSURE: 118 MMHG

## 2021-08-06 DIAGNOSIS — Z20.822 ENCOUNTER FOR SCREENING LABORATORY TESTING FOR COVID-19 VIRUS: ICD-10-CM

## 2021-08-06 DIAGNOSIS — R11.0 NAUSEA: ICD-10-CM

## 2021-08-06 DIAGNOSIS — H66.002 NON-RECURRENT ACUTE SUPPURATIVE OTITIS MEDIA OF LEFT EAR WITHOUT SPONTANEOUS RUPTURE OF TYMPANIC MEMBRANE: Primary | ICD-10-CM

## 2021-08-06 PROCEDURE — U0005 INFEC AGEN DETEC AMPLI PROBE: HCPCS | Performed by: NURSE PRACTITIONER

## 2021-08-06 PROCEDURE — 99203 OFFICE O/P NEW LOW 30 MIN: CPT | Performed by: NURSE PRACTITIONER

## 2021-08-06 PROCEDURE — 78264 GASTRIC EMPTYING IMG STUDY: CPT

## 2021-08-06 PROCEDURE — A9541 TC99M SULFUR COLLOID: HCPCS

## 2021-08-06 PROCEDURE — U0003 INFECTIOUS AGENT DETECTION BY NUCLEIC ACID (DNA OR RNA); SEVERE ACUTE RESPIRATORY SYNDROME CORONAVIRUS 2 (SARS-COV-2) (CORONAVIRUS DISEASE [COVID-19]), AMPLIFIED PROBE TECHNIQUE, MAKING USE OF HIGH THROUGHPUT TECHNOLOGIES AS DESCRIBED BY CMS-2020-01-R: HCPCS | Performed by: NURSE PRACTITIONER

## 2021-08-06 PROCEDURE — G1004 CDSM NDSC: HCPCS

## 2021-08-06 RX ORDER — AMOXICILLIN 500 MG/1
500 CAPSULE ORAL EVERY 12 HOURS SCHEDULED
Qty: 20 CAPSULE | Refills: 0 | Status: SHIPPED | OUTPATIENT
Start: 2021-08-06 | End: 2021-08-16

## 2021-08-06 NOTE — PROGRESS NOTES
[facility]        NAME: Lucrecia Wheeler is a 35 y o  female  : 1990    MRN: [id number]  DATE: 2021  TIME: 7:03 PM    Assessment and Plan   Non-recurrent acute suppurative otitis media of left ear without spontaneous rupture of tympanic membrane [H66 002]  1  Non-recurrent acute suppurative otitis media of left ear without spontaneous rupture of tympanic membrane  amoxicillin (AMOXIL) 500 mg capsule   2  Encounter for screening laboratory testing for COVID-19 virus  Novel Coronavirus (Covid-19),PCR [facility] Collection         Patient Instructions       Follow up with PCP in 3-5 days  Proceed to  ER if symptoms worsen  Chief Complaint     Chief Complaint   Patient presents with    Sinus Problem     headache/ ear pain and right eye pain- pt thinks she has a sinus infection          History of Present Illness       Patient is a 35year old female presenting with sinus congestion, scratchy throat, and bilateral ear pain  Denies fever, chills, cough, or SOB  She took Tylenol  Had Covid vaccines  Denies known exposure to Covid  Review of Systems   Review of Systems   Constitutional: Negative for activity change, chills and fever  HENT: Positive for congestion, ear pain and sore throat  Respiratory: Negative for cough and shortness of breath  Neurological: Positive for headaches           Current Medications       Current Outpatient Medications:     ALPRAZolam (XANAX PO), Take by mouth as needed, Disp: , Rfl:     busPIRone (BUSPAR) 5 mg tablet, Take 5 mg by mouth 3 (three) times a day, Disp: , Rfl:     dicyclomine (BENTYL) 20 mg tablet, TAKE 1 TABLET (20 MG TOTAL) BY MOUTH EVERY 6 (SIX) HOURS AS NEEDED (ABDOMINAL CRAMPING), Disp: 90 tablet, Rfl: 3    docusate sodium (COLACE) 100 mg capsule, Take 1 capsule (100 mg total) by mouth every 12 (twelve) hours, Disp: 60 capsule, Rfl: 0    Flovent  MCG/ACT inhaler, TAKE 1 PUFF BY MOUTH TWICE A DAY, Disp: , Rfl:     Junel 1/20 1-20 MG-MCG per tablet, Take 1 tablet by mouth daily, Disp: , Rfl:     norethindrone-ethinyl estradiol (JUNEL FE 1/20) 1-20 MG-MCG per tablet, Take 1 tablet by mouth daily, Disp: , Rfl:     ondansetron (ZOFRAN-ODT) 4 mg disintegrating tablet, Take 1 tablet (4 mg total) by mouth every 6 (six) hours as needed for nausea or vomiting, Disp: 30 tablet, Rfl: 2    sertraline (ZOLOFT) 100 mg tablet, TAKE ONE TABLET (100 MG) BY MOUTH DAILY, Disp: , Rfl:     amoxicillin (AMOXIL) 500 mg capsule, Take 1 capsule (500 mg total) by mouth every 12 (twelve) hours for 10 days, Disp: 20 capsule, Rfl: 0    Clenpiq 10-3 5-12 MG-GM -GM/160ML SOLN, FOLLOW DIRECTIONS ON SHEET GIVEN, Disp: , Rfl:     pantoprazole (PROTONIX) 40 mg tablet, Take 1 tablet (40 mg total) by mouth daily, Disp: 30 tablet, Rfl: 0    Sertraline HCl (ZOLOFT PO), Take 100 mg by mouth daily , Disp: , Rfl:     Current Allergies     Allergies as of 08/06/2021 - Reviewed 08/06/2021   Allergen Reaction Noted    Amoxicillin-pot clavulanate Vomiting and GI Intolerance 08/09/2019    Bee pollen Itching 04/25/2017    Pollen extract Itching 04/25/2017            The following portions of the patient's history were reviewed and updated as appropriate: allergies, current medications, past family history, past medical history, past social history, past surgical history and problem list      Past Medical History:   Diagnosis Date    Anemia     Anxiety     Asthma     Depression     Disease of thyroid gland     GERD (gastroesophageal reflux disease)     Varicella        Past Surgical History:   Procedure Laterality Date    COLONOSCOPY  2015    EGD  04/06/2021    THYROIDECTOMY, PARTIAL  2017    UPPER GASTROINTESTINAL ENDOSCOPY         Family History   Problem Relation Age of Onset    Hypertension Father     Arthritis Father     Anxiety disorder Father     Hyperlipidemia Father     Diabetes Maternal Grandfather     Breast cancer Paternal Grandmother    [facility] Cancer Paternal Grandmother         thyroid    Anxiety disorder Mother     Diabetes Mother     Depression Mother     Fibroids Mother     Infertility Mother     Sickle cell trait Brother     Heart murmur Brother     Heart murmur Son     Autism Brother     Bleeding Disorder Brother     Seizures Brother          Medications have been verified  Objective   /68   Pulse 78   Temp (!) 96 8 °F (36 °C)   Resp 16   Ht 5' 4" (1 626 m)   Wt 94 8 kg (209 lb)   SpO2 99%   BMI 35 87 kg/m²        Physical Exam     Physical Exam  Vitals reviewed  Constitutional:       General: She is awake  Appearance: Normal appearance  HENT:      Head: Normocephalic  Right Ear: Hearing, tympanic membrane, ear canal and external ear normal       Left Ear: Hearing, ear canal and external ear normal  Tympanic membrane is erythematous and bulging  Nose: Nose normal    Cardiovascular:      Rate and Rhythm: Normal rate and regular rhythm  Heart sounds: Normal heart sounds, S1 normal and S2 normal    Pulmonary:      Effort: Pulmonary effort is normal       Breath sounds: Normal breath sounds  No decreased breath sounds, wheezing, rhonchi or rales  Skin:     General: Skin is warm and moist    Neurological:      General: No focal deficit present  Mental Status: She is alert, oriented to person, place, and time and easily aroused  Psychiatric:         Behavior: Behavior is cooperative

## 2021-08-07 LAB — SARS-COV-2 RNA RESP QL NAA+PROBE: NEGATIVE

## 2021-08-10 ENCOUNTER — HOSPITAL ENCOUNTER (OUTPATIENT)
Dept: RADIOLOGY | Facility: HOSPITAL | Age: 31
Discharge: HOME | End: 2021-08-10
Payer: COMMERCIAL

## 2021-08-10 DIAGNOSIS — R06.02 SHORTNESS OF BREATH: ICD-10-CM

## 2021-08-10 PROCEDURE — 71046 X-RAY EXAM CHEST 2 VIEWS: CPT

## 2021-09-27 ENCOUNTER — TELEPHONE (OUTPATIENT)
Dept: OBGYN CLINIC | Facility: CLINIC | Age: 31
End: 2021-09-27

## 2021-09-27 NOTE — TELEPHONE ENCOUNTER
Pt spotting off and on and cramping periodically throughout the last month  Pt said she typically does not bleed and takes medication to stop her cycle    Please advise

## 2021-09-27 NOTE — TELEPHONE ENCOUNTER
Pt taking all active pills and since then has btb  I encouraged pt to allow for menses every 3 mos - that will often stop btb  Pt was in ed yesterday - chest pain   Nothing found   Her hgb was fine

## 2021-11-02 ENCOUNTER — OFFICE VISIT (OUTPATIENT)
Dept: URGENT CARE | Age: 31
End: 2021-11-02
Payer: COMMERCIAL

## 2021-11-02 VITALS — OXYGEN SATURATION: 98 % | RESPIRATION RATE: 20 BRPM | TEMPERATURE: 97.6 F | HEART RATE: 88 BPM

## 2021-11-02 DIAGNOSIS — H66.91 RIGHT OTITIS MEDIA, UNSPECIFIED OTITIS MEDIA TYPE: Primary | ICD-10-CM

## 2021-11-02 DIAGNOSIS — Z11.52 ENCOUNTER FOR SCREENING FOR COVID-19: ICD-10-CM

## 2021-11-02 PROCEDURE — U0003 INFECTIOUS AGENT DETECTION BY NUCLEIC ACID (DNA OR RNA); SEVERE ACUTE RESPIRATORY SYNDROME CORONAVIRUS 2 (SARS-COV-2) (CORONAVIRUS DISEASE [COVID-19]), AMPLIFIED PROBE TECHNIQUE, MAKING USE OF HIGH THROUGHPUT TECHNOLOGIES AS DESCRIBED BY CMS-2020-01-R: HCPCS | Performed by: NURSE PRACTITIONER

## 2021-11-02 PROCEDURE — 99213 OFFICE O/P EST LOW 20 MIN: CPT | Performed by: NURSE PRACTITIONER

## 2021-11-02 PROCEDURE — U0005 INFEC AGEN DETEC AMPLI PROBE: HCPCS | Performed by: NURSE PRACTITIONER

## 2021-11-02 RX ORDER — AZITHROMYCIN 250 MG/1
TABLET, FILM COATED ORAL
Qty: 6 TABLET | Refills: 0 | Status: SHIPPED | OUTPATIENT
Start: 2021-11-02 | End: 2021-11-06

## 2021-11-03 LAB — SARS-COV-2 RNA RESP QL NAA+PROBE: NEGATIVE

## 2021-11-10 ENCOUNTER — OFFICE VISIT (OUTPATIENT)
Dept: OBGYN CLINIC | Facility: HOSPITAL | Age: 31
End: 2021-11-10
Payer: COMMERCIAL

## 2021-11-10 ENCOUNTER — HOSPITAL ENCOUNTER (OUTPATIENT)
Dept: RADIOLOGY | Facility: HOSPITAL | Age: 31
Discharge: HOME | End: 2021-11-10
Payer: COMMERCIAL

## 2021-11-10 VITALS — SYSTOLIC BLOOD PRESSURE: 109 MMHG | HEART RATE: 62 BPM | DIASTOLIC BLOOD PRESSURE: 75 MMHG

## 2021-11-10 DIAGNOSIS — M76.899 QUADRICEPS TENDINITIS: ICD-10-CM

## 2021-11-10 DIAGNOSIS — M25.562 PATELLOFEMORAL ARTHRALGIA OF LEFT KNEE: ICD-10-CM

## 2021-11-10 DIAGNOSIS — M25.562 LEFT KNEE PAIN, UNSPECIFIED CHRONICITY: ICD-10-CM

## 2021-11-10 DIAGNOSIS — M25.562 LEFT KNEE PAIN, UNSPECIFIED CHRONICITY: Primary | ICD-10-CM

## 2021-11-10 PROCEDURE — 99203 OFFICE O/P NEW LOW 30 MIN: CPT | Performed by: PHYSICIAN ASSISTANT

## 2021-11-10 PROCEDURE — 73560 X-RAY EXAM OF KNEE 1 OR 2: CPT

## 2021-11-17 ENCOUNTER — EVALUATION (OUTPATIENT)
Dept: PHYSICAL THERAPY | Facility: CLINIC | Age: 31
End: 2021-11-17
Payer: COMMERCIAL

## 2021-11-17 DIAGNOSIS — M25.562 LEFT KNEE PAIN, UNSPECIFIED CHRONICITY: ICD-10-CM

## 2021-11-17 PROCEDURE — 97110 THERAPEUTIC EXERCISES: CPT | Performed by: PHYSICAL THERAPIST

## 2021-11-17 PROCEDURE — 97162 PT EVAL MOD COMPLEX 30 MIN: CPT | Performed by: PHYSICAL THERAPIST

## 2021-11-22 ENCOUNTER — APPOINTMENT (OUTPATIENT)
Dept: PHYSICAL THERAPY | Facility: CLINIC | Age: 31
End: 2021-11-22
Payer: COMMERCIAL

## 2021-11-24 ENCOUNTER — OFFICE VISIT (OUTPATIENT)
Dept: PHYSICAL THERAPY | Facility: CLINIC | Age: 31
End: 2021-11-24
Payer: COMMERCIAL

## 2021-11-24 DIAGNOSIS — M25.562 LEFT KNEE PAIN, UNSPECIFIED CHRONICITY: Primary | ICD-10-CM

## 2021-11-24 PROCEDURE — 97110 THERAPEUTIC EXERCISES: CPT | Performed by: PHYSICAL THERAPIST

## 2021-11-24 PROCEDURE — 97140 MANUAL THERAPY 1/> REGIONS: CPT | Performed by: PHYSICAL THERAPIST

## 2021-11-29 ENCOUNTER — OFFICE VISIT (OUTPATIENT)
Dept: PHYSICAL THERAPY | Facility: CLINIC | Age: 31
End: 2021-11-29
Payer: COMMERCIAL

## 2021-11-29 DIAGNOSIS — M25.562 LEFT KNEE PAIN, UNSPECIFIED CHRONICITY: Primary | ICD-10-CM

## 2021-11-29 PROCEDURE — 97110 THERAPEUTIC EXERCISES: CPT

## 2021-11-29 PROCEDURE — 97140 MANUAL THERAPY 1/> REGIONS: CPT

## 2021-11-29 PROCEDURE — 97116 GAIT TRAINING THERAPY: CPT

## 2021-12-01 ENCOUNTER — OFFICE VISIT (OUTPATIENT)
Dept: PHYSICAL THERAPY | Facility: CLINIC | Age: 31
End: 2021-12-01
Payer: COMMERCIAL

## 2021-12-01 DIAGNOSIS — M25.562 LEFT KNEE PAIN, UNSPECIFIED CHRONICITY: Primary | ICD-10-CM

## 2021-12-01 PROCEDURE — 97110 THERAPEUTIC EXERCISES: CPT | Performed by: PHYSICAL THERAPIST

## 2021-12-01 PROCEDURE — 97116 GAIT TRAINING THERAPY: CPT | Performed by: PHYSICAL THERAPIST

## 2021-12-06 ENCOUNTER — OFFICE VISIT (OUTPATIENT)
Dept: PHYSICAL THERAPY | Facility: CLINIC | Age: 31
End: 2021-12-06
Payer: COMMERCIAL

## 2021-12-06 DIAGNOSIS — M25.562 LEFT KNEE PAIN, UNSPECIFIED CHRONICITY: Primary | ICD-10-CM

## 2021-12-06 PROCEDURE — 97116 GAIT TRAINING THERAPY: CPT

## 2021-12-06 PROCEDURE — 97110 THERAPEUTIC EXERCISES: CPT

## 2021-12-08 ENCOUNTER — OFFICE VISIT (OUTPATIENT)
Dept: PHYSICAL THERAPY | Facility: CLINIC | Age: 31
End: 2021-12-08
Payer: COMMERCIAL

## 2021-12-08 DIAGNOSIS — M25.562 LEFT KNEE PAIN, UNSPECIFIED CHRONICITY: Primary | ICD-10-CM

## 2021-12-08 PROCEDURE — 97140 MANUAL THERAPY 1/> REGIONS: CPT

## 2021-12-08 PROCEDURE — 97110 THERAPEUTIC EXERCISES: CPT

## 2021-12-09 ENCOUNTER — OFFICE VISIT (OUTPATIENT)
Dept: OBGYN CLINIC | Facility: OTHER | Age: 31
End: 2021-12-09
Payer: COMMERCIAL

## 2021-12-09 VITALS
WEIGHT: 212 LBS | HEIGHT: 64 IN | HEART RATE: 63 BPM | SYSTOLIC BLOOD PRESSURE: 127 MMHG | BODY MASS INDEX: 36.19 KG/M2 | DIASTOLIC BLOOD PRESSURE: 85 MMHG

## 2021-12-09 DIAGNOSIS — M54.50 CHRONIC BILATERAL LOW BACK PAIN WITHOUT SCIATICA: ICD-10-CM

## 2021-12-09 DIAGNOSIS — M21.42 PES PLANUS OF BOTH FEET: ICD-10-CM

## 2021-12-09 DIAGNOSIS — M21.41 PES PLANUS OF BOTH FEET: ICD-10-CM

## 2021-12-09 DIAGNOSIS — M25.562 ACUTE PAIN OF LEFT KNEE: ICD-10-CM

## 2021-12-09 DIAGNOSIS — M22.2X2 PATELLOFEMORAL DISORDER OF LEFT KNEE: Primary | ICD-10-CM

## 2021-12-09 DIAGNOSIS — G89.29 CHRONIC BILATERAL LOW BACK PAIN WITHOUT SCIATICA: ICD-10-CM

## 2021-12-09 PROCEDURE — 99204 OFFICE O/P NEW MOD 45 MIN: CPT | Performed by: FAMILY MEDICINE

## 2021-12-09 PROCEDURE — 20610 DRAIN/INJ JOINT/BURSA W/O US: CPT | Performed by: FAMILY MEDICINE

## 2021-12-09 RX ORDER — CYCLOBENZAPRINE HYDROCHLORIDE 7.5 MG/1
7.5 TABLET, FILM COATED ORAL
Qty: 30 TABLET | Refills: 0 | Status: SHIPPED | OUTPATIENT
Start: 2021-12-09

## 2021-12-09 RX ADMIN — TRIAMCINOLONE ACETONIDE 40 MG: 40 INJECTION, SUSPENSION INTRA-ARTICULAR; INTRAMUSCULAR at 18:34

## 2021-12-09 RX ADMIN — LIDOCAINE HYDROCHLORIDE 4 ML: 10 INJECTION, SOLUTION INFILTRATION; PERINEURAL at 18:34

## 2021-12-11 RX ORDER — TRIAMCINOLONE ACETONIDE 40 MG/ML
40 INJECTION, SUSPENSION INTRA-ARTICULAR; INTRAMUSCULAR
Status: COMPLETED | OUTPATIENT
Start: 2021-12-09 | End: 2021-12-09

## 2021-12-11 RX ORDER — LIDOCAINE HYDROCHLORIDE 10 MG/ML
4 INJECTION, SOLUTION INFILTRATION; PERINEURAL
Status: COMPLETED | OUTPATIENT
Start: 2021-12-09 | End: 2021-12-09

## 2021-12-13 ENCOUNTER — APPOINTMENT (OUTPATIENT)
Dept: PHYSICAL THERAPY | Facility: CLINIC | Age: 31
End: 2021-12-13
Payer: COMMERCIAL

## 2021-12-15 ENCOUNTER — APPOINTMENT (OUTPATIENT)
Dept: PHYSICAL THERAPY | Facility: CLINIC | Age: 31
End: 2021-12-15
Payer: COMMERCIAL

## 2021-12-20 ENCOUNTER — OFFICE VISIT (OUTPATIENT)
Dept: PHYSICAL THERAPY | Facility: CLINIC | Age: 31
End: 2021-12-20
Payer: COMMERCIAL

## 2021-12-20 DIAGNOSIS — M25.562 LEFT KNEE PAIN, UNSPECIFIED CHRONICITY: Primary | ICD-10-CM

## 2021-12-20 PROCEDURE — 97140 MANUAL THERAPY 1/> REGIONS: CPT

## 2021-12-20 PROCEDURE — 97116 GAIT TRAINING THERAPY: CPT

## 2021-12-20 PROCEDURE — 97110 THERAPEUTIC EXERCISES: CPT

## 2021-12-22 ENCOUNTER — OFFICE VISIT (OUTPATIENT)
Dept: PHYSICAL THERAPY | Facility: CLINIC | Age: 31
End: 2021-12-22
Payer: COMMERCIAL

## 2021-12-22 DIAGNOSIS — M25.562 LEFT KNEE PAIN, UNSPECIFIED CHRONICITY: Primary | ICD-10-CM

## 2021-12-22 PROCEDURE — 97110 THERAPEUTIC EXERCISES: CPT

## 2021-12-22 PROCEDURE — 97116 GAIT TRAINING THERAPY: CPT

## 2021-12-22 PROCEDURE — 97140 MANUAL THERAPY 1/> REGIONS: CPT

## 2021-12-27 ENCOUNTER — OFFICE VISIT (OUTPATIENT)
Dept: PHYSICAL THERAPY | Facility: CLINIC | Age: 31
End: 2021-12-27
Payer: COMMERCIAL

## 2021-12-27 DIAGNOSIS — M25.562 LEFT KNEE PAIN, UNSPECIFIED CHRONICITY: Primary | ICD-10-CM

## 2021-12-27 PROCEDURE — 97140 MANUAL THERAPY 1/> REGIONS: CPT | Performed by: PHYSICAL THERAPIST

## 2021-12-27 PROCEDURE — 97110 THERAPEUTIC EXERCISES: CPT | Performed by: PHYSICAL THERAPIST

## 2021-12-27 PROCEDURE — 97116 GAIT TRAINING THERAPY: CPT | Performed by: PHYSICAL THERAPIST

## 2021-12-29 ENCOUNTER — EVALUATION (OUTPATIENT)
Dept: PHYSICAL THERAPY | Facility: CLINIC | Age: 31
End: 2021-12-29
Payer: COMMERCIAL

## 2021-12-29 DIAGNOSIS — G89.29 CHRONIC MIDLINE LOW BACK PAIN WITHOUT SCIATICA: ICD-10-CM

## 2021-12-29 DIAGNOSIS — M54.50 CHRONIC MIDLINE LOW BACK PAIN WITHOUT SCIATICA: ICD-10-CM

## 2021-12-29 DIAGNOSIS — M25.562 LEFT KNEE PAIN, UNSPECIFIED CHRONICITY: Primary | ICD-10-CM

## 2021-12-29 PROCEDURE — 97110 THERAPEUTIC EXERCISES: CPT | Performed by: PHYSICAL THERAPIST

## 2021-12-29 PROCEDURE — 97112 NEUROMUSCULAR REEDUCATION: CPT | Performed by: PHYSICAL THERAPIST

## 2021-12-29 PROCEDURE — 97164 PT RE-EVAL EST PLAN CARE: CPT | Performed by: PHYSICAL THERAPIST
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
## 2022-01-03 ENCOUNTER — OFFICE VISIT (OUTPATIENT)
Dept: RHEUMATOLOGY | Facility: CLINIC | Age: 32
End: 2022-01-03
Payer: COMMERCIAL

## 2022-01-03 VITALS
DIASTOLIC BLOOD PRESSURE: 76 MMHG | SYSTOLIC BLOOD PRESSURE: 118 MMHG | BODY MASS INDEX: 36.19 KG/M2 | HEIGHT: 64 IN | WEIGHT: 212 LBS

## 2022-01-03 DIAGNOSIS — G89.29 CHRONIC PATELLOFEMORAL PAIN OF LEFT KNEE: ICD-10-CM

## 2022-01-03 DIAGNOSIS — M25.50 POLYARTHRALGIA: Primary | ICD-10-CM

## 2022-01-03 DIAGNOSIS — M25.562 CHRONIC PATELLOFEMORAL PAIN OF LEFT KNEE: ICD-10-CM

## 2022-01-03 DIAGNOSIS — G89.29 CHRONIC BILATERAL LOW BACK PAIN WITHOUT SCIATICA: ICD-10-CM

## 2022-01-03 DIAGNOSIS — M54.50 CHRONIC BILATERAL LOW BACK PAIN WITHOUT SCIATICA: ICD-10-CM

## 2022-01-03 PROCEDURE — 99205 OFFICE O/P NEW HI 60 MIN: CPT | Performed by: INTERNAL MEDICINE

## 2022-01-06 ENCOUNTER — OFFICE VISIT (OUTPATIENT)
Dept: PHYSICAL THERAPY | Facility: CLINIC | Age: 32
End: 2022-01-06
Payer: COMMERCIAL

## 2022-01-06 DIAGNOSIS — G89.29 CHRONIC MIDLINE LOW BACK PAIN WITHOUT SCIATICA: ICD-10-CM

## 2022-01-06 DIAGNOSIS — M54.50 CHRONIC MIDLINE LOW BACK PAIN WITHOUT SCIATICA: ICD-10-CM

## 2022-01-06 DIAGNOSIS — M25.562 LEFT KNEE PAIN, UNSPECIFIED CHRONICITY: Primary | ICD-10-CM

## 2022-01-06 PROCEDURE — 97110 THERAPEUTIC EXERCISES: CPT

## 2022-01-06 PROCEDURE — 97112 NEUROMUSCULAR REEDUCATION: CPT

## 2022-01-06 NOTE — PROGRESS NOTES
Daily Note     Today's date: 2022  Patient name: Mariah Adams  : 1990  MRN: [id number]  Referring provider: Mireya Dumont  Dx:   Encounter Diagnosis     ICD-10-CM    1  Left knee pain, unspecified chronicity  M25 562    2  Chronic midline low back pain without sciatica  M54 50     G89 29        Start Time: 1735  Stop Time: 1820  Total time in clinic (min): 45 minutes    Subjective: Patient reports that (L) knee is moderately painful prior to start of session - rated 7/10  Objective: See treatment diary below      Assessment: Tolerated treatment well  During SLR difficulty noted R>L  Patient demonstrated fatigue post treatment, exhibited good technique with therapeutic exercises and would benefit from continued PT  Plan: Continue per plan of care  Precautions:  PMHx: IBS, s/p partial thyroidectomy, depression, anxiety, asthma  Dx: L PFPS, quadriceps tendonitis, postural dysfunction, core instability    Manuals            Laser L anterior knee 5000J 12 5 Gregorio  3 min           Prone Gr iV PA T4-9 mobs 1x30 ea NV                                     Neuro Re-Ed             Abdominal bracing 5"x10 5"x10           Supine DLS marches             bridges 2x10 2x10           Standing DLS hip ABD B/L  5M25 B/L  2x10           Postural correction 3 min 3min           Seated c/s retraction 2"x10 2"x10           Seated scap retraction 5"x10 5"x10           B/L TB ER             DNF             Ther Ex             SLR 2x10 2x10           Seated thoracic extension over chair 3"x10 3"x10                                                                                         Ther Activity                                       Gait Training             AlterG: normalize sagittal plane motion                          Modalities

## 2022-01-10 ENCOUNTER — OFFICE VISIT (OUTPATIENT)
Dept: PHYSICAL THERAPY | Facility: CLINIC | Age: 32
End: 2022-01-10
Payer: COMMERCIAL

## 2022-01-10 DIAGNOSIS — M25.562 LEFT KNEE PAIN, UNSPECIFIED CHRONICITY: Primary | ICD-10-CM

## 2022-01-10 DIAGNOSIS — G89.29 CHRONIC MIDLINE LOW BACK PAIN WITHOUT SCIATICA: ICD-10-CM

## 2022-01-10 DIAGNOSIS — M54.50 CHRONIC MIDLINE LOW BACK PAIN WITHOUT SCIATICA: ICD-10-CM

## 2022-01-10 PROCEDURE — 97110 THERAPEUTIC EXERCISES: CPT

## 2022-01-10 PROCEDURE — 97112 NEUROMUSCULAR REEDUCATION: CPT

## 2022-01-10 PROCEDURE — 97140 MANUAL THERAPY 1/> REGIONS: CPT

## 2022-01-10 NOTE — PROGRESS NOTES
Daily Note     Today's date: 1/10/2022  Patient name: Olgeario Preston  : 1990  MRN: [id number]  Referring provider: Mary Ellen Dumas  Dx:   Encounter Diagnosis     ICD-10-CM    1  Left knee pain, unspecified chronicity  M25 562    2  Chronic midline low back pain without sciatica  M54 50     G89 29        Start Time: 1700  Stop Time: 1740  Total time in clinic (min): 40 minutes    Subjective: Patient reports that (L) knee feels okay prior to start of session but lower back is moderately painful starting on Saturday  Symptoms are aggravated by prolonged standing and possibly by sleeping positions at night  Objective: See treatment diary below      Assessment: Tolerated treatment well  Pain noted in l/s t/o session  Decreased hip flexor/ quad strength during SLR this session bilaterally  Patient demonstrated fatigue post treatment and would benefit from continued PT  Plan: Continue per plan of care  Precautions:  PMHx: IBS, s/p partial thyroidectomy, depression, anxiety, asthma  Dx: L PFPS, quadriceps tendonitis, postural dysfunction, core instability    Manuals 12/29 1/6 1/10          Laser L anterior knee 5000J 12 5 Gregorio  3 min 5000J          Prone Gr iV PA T4-9 mobs 1x30 ea NV NV                                    Neuro Re-Ed             Abdominal bracing 5"x10 5"x10 5"x10          Supine DLS marches             bridges 2x10 2x10 2x10          Standing DLS hip ABD B/L  1F50 B/L  2x10 B/L  2x10          Postural correction 3 min 3min 3min          Seated c/s retraction 2"x10 2"x10 2"x10          Seated scap retraction 5"x10 5"x10 5"x10          B/L TB ER             DNF             Ther Ex             SLR 2x10 2x10 2x10ea          Seated thoracic extension over chair 3"x10 3"x10 3"x10                                                                                        Ther Activity                                       Gait Training             AlterG: normalize sagittal plane motion Modalities

## 2022-01-12 ENCOUNTER — OFFICE VISIT (OUTPATIENT)
Dept: PHYSICAL THERAPY | Facility: CLINIC | Age: 32
End: 2022-01-12
Payer: COMMERCIAL

## 2022-01-12 DIAGNOSIS — M54.50 CHRONIC MIDLINE LOW BACK PAIN WITHOUT SCIATICA: ICD-10-CM

## 2022-01-12 DIAGNOSIS — G89.29 CHRONIC MIDLINE LOW BACK PAIN WITHOUT SCIATICA: ICD-10-CM

## 2022-01-12 DIAGNOSIS — M25.562 LEFT KNEE PAIN, UNSPECIFIED CHRONICITY: Primary | ICD-10-CM

## 2022-01-12 PROCEDURE — 97110 THERAPEUTIC EXERCISES: CPT

## 2022-01-12 PROCEDURE — 97112 NEUROMUSCULAR REEDUCATION: CPT

## 2022-01-12 PROCEDURE — 97140 MANUAL THERAPY 1/> REGIONS: CPT

## 2022-01-13 NOTE — PROGRESS NOTES
Daily Note     Today's date: 2022  Patient name: Kim Bahena  : 1990  MRN: [id number]  Referring provider: Sintia Kitchen  Dx:   Encounter Diagnosis     ICD-10-CM    1  Left knee pain, unspecified chronicity  M25 562    2  Chronic midline low back pain without sciatica  M54 50     G89 29                   Subjective: Patient reports 5-6/10 left knee pain, 7/10 mid back pain pre tx  Objective: See treatment diary below      Assessment: Pt demonstrated fair postural awareness, limited tolerance to WB strengthening 2* left knee pain  Plan: Continue per plan of care  Precautions:  PMHx: IBS, s/p partial thyroidectomy, depression, anxiety, asthma  Dx: L PFPS, quadriceps tendonitis, postural dysfunction, core instability    Manuals 12/29 1/6 1/10 1/12         Laser L anterior knee 5000J 12 5 Gregorio  3 min 5000J 5000J         Prone Gr iV PA T4-9 mobs 1x30 ea NV NV                                    Neuro Re-Ed             Abdominal bracing 5"x10 5"x10 5"x10 5"x10         Supine DLS marches             bridges 2x10 2x10 2x10 3x10         Standing DLS hip ABD B/L  3X71 B/L  2x10 B/L  2x10 B/L  3x10         Postural correction 3 min 3min 3min 3 min         Seated c/s retraction 2"x10 2"x10 2"x10 2"x10         Seated scap retraction 5"x10 5"x10 5"x10 5"x10         B/L TB ER    RTB/  2x10         DNF             Ther Ex             SLR 2x10 2x10 2x10ea 2x10 ea         Seated thoracic extension over chair 3"x10 3"x10 3"x10 3"x10                                                                                       Ther Activity                                       Gait Training             AlterG: normalize sagittal plane motion                          Modalities

## 2022-01-18 ENCOUNTER — OFFICE VISIT (OUTPATIENT)
Dept: PHYSICAL THERAPY | Facility: CLINIC | Age: 32
End: 2022-01-18
Payer: COMMERCIAL

## 2022-01-18 DIAGNOSIS — M54.50 CHRONIC MIDLINE LOW BACK PAIN WITHOUT SCIATICA: ICD-10-CM

## 2022-01-18 DIAGNOSIS — G89.29 CHRONIC MIDLINE LOW BACK PAIN WITHOUT SCIATICA: ICD-10-CM

## 2022-01-18 DIAGNOSIS — M25.562 LEFT KNEE PAIN, UNSPECIFIED CHRONICITY: Primary | ICD-10-CM

## 2022-01-18 PROCEDURE — 97112 NEUROMUSCULAR REEDUCATION: CPT

## 2022-01-18 PROCEDURE — 97110 THERAPEUTIC EXERCISES: CPT

## 2022-01-18 NOTE — PROGRESS NOTES
Daily Note     Today's date: 2022  Patient name: Aixa Lott  : 1990  MRN: [id number]  Referring provider: Prabha Lara  Dx:   Encounter Diagnosis     ICD-10-CM    1  Left knee pain, unspecified chronicity  M25 562    2  Chronic midline low back pain without sciatica  M54 50     G89 29        Start Time: 1730  Stop Time: 1810  Total time in clinic (min): 40 minutes    Subjective: Patient reports continued L knee and back pain  She notes the knee has improved but the back is about the same  Objective: See treatment diary below    Assessment: Patient easily fatigued to the assigned exercise program      Plan: Continue per plan of care  Precautions:  PMHx: IBS, s/p partial thyroidectomy, depression, anxiety, asthma  Dx: L PFPS, quadriceps tendonitis, postural dysfunction, core instability    Manuals 12/29 1/6 1/10 1/12 1/18        Laser L anterior knee 5000J 12 5 Gregorio  3 min 5000J 5000J 5000J        Prone Gr iV PA T4-9 mobs 1x30 ea NV NV                                    Neuro Re-Ed             Abdominal bracing 5"x10 5"x10 5"x10 5"x10 5"x10        Supine DLS marches             bridges 2x10 2x10 2x10 3x10 3x10        Standing DLS hip ABD B/L  5J36 B/L  2x10 B/L  2x10 B/L  3x10 B/L  3x10        Postural correction 3 min 3min 3min 3 min 3 min        Seated c/s retraction 2"x10 2"x10 2"x10 2"x10 2"x10        Seated scap retraction 5"x10 5"x10 5"x10 5"x10 5"x10        B/L TB ER    RTB/  2x10 RTB/  2x10        DNF             Ther Ex             SLR 2x10 2x10 2x10ea 2x10 ea 2x10 ea        Seated thoracic extension over chair 3"x10 3"x10 3"x10 3"x10 3"x10                                                                                      Ther Activity                                       Gait Training             AlterG: normalize sagittal plane motion                          Modalities

## 2022-01-20 ENCOUNTER — APPOINTMENT (OUTPATIENT)
Dept: PHYSICAL THERAPY | Facility: CLINIC | Age: 32
End: 2022-01-20
Payer: COMMERCIAL

## 2022-01-22 ENCOUNTER — OFFICE VISIT (OUTPATIENT)
Dept: URGENT CARE | Age: 32
End: 2022-01-22
Payer: COMMERCIAL

## 2022-01-22 VITALS
HEART RATE: 108 BPM | WEIGHT: 210 LBS | RESPIRATION RATE: 18 BRPM | HEIGHT: 64 IN | TEMPERATURE: 97.8 F | BODY MASS INDEX: 35.85 KG/M2 | OXYGEN SATURATION: 99 %

## 2022-01-22 DIAGNOSIS — R11.0 NAUSEA: ICD-10-CM

## 2022-01-22 DIAGNOSIS — J06.9 VIRAL URI WITH COUGH: Primary | ICD-10-CM

## 2022-01-22 PROCEDURE — 99213 OFFICE O/P EST LOW 20 MIN: CPT

## 2022-01-22 RX ORDER — ONDANSETRON 4 MG/1
4 TABLET, ORALLY DISINTEGRATING ORAL EVERY 6 HOURS PRN
Qty: 20 TABLET | Refills: 0 | Status: SHIPPED | OUTPATIENT
Start: 2022-01-22 | End: 2022-05-06

## 2022-01-23 ENCOUNTER — APPOINTMENT (OUTPATIENT)
Dept: LAB | Facility: HOSPITAL | Age: 32
End: 2022-01-23
Payer: COMMERCIAL

## 2022-01-23 PROCEDURE — U0003 INFECTIOUS AGENT DETECTION BY NUCLEIC ACID (DNA OR RNA); SEVERE ACUTE RESPIRATORY SYNDROME CORONAVIRUS 2 (SARS-COV-2) (CORONAVIRUS DISEASE [COVID-19]), AMPLIFIED PROBE TECHNIQUE, MAKING USE OF HIGH THROUGHPUT TECHNOLOGIES AS DESCRIBED BY CMS-2020-01-R: HCPCS

## 2022-01-23 PROCEDURE — U0005 INFEC AGEN DETEC AMPLI PROBE: HCPCS

## 2022-01-23 NOTE — PROGRESS NOTES
[facility]        NAME: Jaci Lea is a 28 y o  female  : 1990    MRN: [id number]  DATE: 2022  TIME: 7:44 PM    Assessment and Plan   Viral URI with cough [J06 9]  1  Viral URI with cough  COVID Only - Collected at [facility]   2  Nausea  ondansetron (Zofran ODT) 4 mg disintegrating tablet         Patient Instructions     Please take zofran as needed for nausea  Follow up with PCP in 3-5 days  Proceed to  ER if symptoms worsen  Chief Complaint     Chief Complaint   Patient presents with    Sinus Problem     CONGESTION AND ITCHY THROAT   Melissa Karo Melissa Karo STARTED WEDNESDAY    Cough    Shortness of Breath     MILD    Nausea     MILD         History of Present Illness       Patient presenting with cough, congestion, itchy throat , nausea and mild headache over the past 3 days  Patient states that she has been taking over-the-counter cold and flu medications, which is helping with her symptoms  She denies any recent sick contacts  She denies any chest pain, shortness of breath, fevers or chills this time  Patient states that she has been vaccinated against COVID  Review of Systems   Review of Systems   Constitutional: Negative for chills and fever  HENT: Positive for congestion and sore throat  Negative for ear pain  Eyes: Negative for pain and visual disturbance  Respiratory: Positive for cough  Negative for shortness of breath  Cardiovascular: Negative for chest pain and palpitations  Gastrointestinal: Positive for nausea  Negative for abdominal pain and vomiting  Genitourinary: Negative for dysuria and hematuria  Musculoskeletal: Negative for arthralgias and back pain  Skin: Negative for color change and rash  Neurological: Positive for headaches  Negative for seizures and syncope  All other systems reviewed and are negative          Current Medications       Current Outpatient Medications:     ALPRAZolam (XANAX PO), Take by mouth as needed, Disp: , Rfl:     busPIRone (BUSPAR) 5 mg tablet, Take 5 mg by mouth 3 (three) times a day, Disp: , Rfl:     Clenpiq 10-3 5-12 MG-GM -GM/160ML SOLN, FOLLOW DIRECTIONS ON SHEET GIVEN, Disp: , Rfl:     cyclobenzaprine (FEXMID) 7 5 MG tablet, Take 1 tablet (7 5 mg total) by mouth daily at bedtime as needed for muscle spasms, Disp: 30 tablet, Rfl: 0    dicyclomine (BENTYL) 20 mg tablet, TAKE 1 TABLET (20 MG TOTAL) BY MOUTH EVERY 6 (SIX) HOURS AS NEEDED (ABDOMINAL CRAMPING), Disp: 90 tablet, Rfl: 3    docusate sodium (COLACE) 100 mg capsule, Take 1 capsule (100 mg total) by mouth every 12 (twelve) hours, Disp: 60 capsule, Rfl: 0    Flovent  MCG/ACT inhaler, TAKE 1 PUFF BY MOUTH TWICE A DAY, Disp: , Rfl:     Junel 1/20 1-20 MG-MCG per tablet, Take 1 tablet by mouth daily, Disp: , Rfl:     norethindrone-ethinyl estradiol (JUNEL FE 1/20) 1-20 MG-MCG per tablet, Take 1 tablet by mouth daily  , Disp: , Rfl:     sertraline (ZOLOFT) 100 mg tablet, TAKE ONE TABLET (100 MG) BY MOUTH DAILY, Disp: , Rfl:     Sertraline HCl (ZOLOFT PO), Take 100 mg by mouth daily , Disp: , Rfl:     ondansetron (Zofran ODT) 4 mg disintegrating tablet, Take 1 tablet (4 mg total) by mouth every 6 (six) hours as needed for nausea or vomiting, Disp: 20 tablet, Rfl: 0    pantoprazole (PROTONIX) 40 mg tablet, Take 1 tablet (40 mg total) by mouth daily, Disp: 30 tablet, Rfl: 0    Current Allergies     Allergies as of 01/22/2022 - Reviewed 01/22/2022   Allergen Reaction Noted    Amoxicillin-pot clavulanate Vomiting and GI Intolerance 08/09/2019    Bee pollen Itching 04/25/2017    Pollen extract Itching 04/25/2017            The following portions of the patient's history were reviewed and updated as appropriate: allergies, current medications, past family history, past medical history, past social history, past surgical history and problem list      Past Medical History:   Diagnosis Date    Anemia     Anxiety     Asthma     Depression     Disease of thyroid gland     GERD (gastroesophageal reflux disease)     Varicella        Past Surgical History:   Procedure Laterality Date    COLONOSCOPY  2015    EGD  04/06/2021    THYROIDECTOMY, PARTIAL  2017    UPPER GASTROINTESTINAL ENDOSCOPY         Family History   Problem Relation Age of Onset    Hypertension Father     Arthritis Father     Anxiety disorder Father     Hyperlipidemia Father     Diabetes Maternal Grandfather     Breast cancer Paternal Grandmother     Cancer Paternal Grandmother         thyroid    Anxiety disorder Mother     Diabetes Mother     Depression Mother     Fibroids Mother     Infertility Mother     Sickle cell trait Brother     Heart murmur Brother     Heart murmur Son     Autism Brother     Bleeding Disorder Brother     Seizures Brother          Medications have been verified  Objective   Pulse (!) 108   Temp 97 8 °F (36 6 °C)   Resp 18   Ht 5' 4" (1 626 m)   Wt 95 3 kg (210 lb)   SpO2 99%   BMI 36 05 kg/m²        Physical Exam     Physical Exam  Vitals and nursing note reviewed  Constitutional:       General: She is not in acute distress  Appearance: Normal appearance  She is not ill-appearing  HENT:      Head: Normocephalic and atraumatic  Right Ear: Tympanic membrane normal       Left Ear: Tympanic membrane normal       Nose: Congestion present  No rhinorrhea  Mouth/Throat:      Mouth: Mucous membranes are moist       Pharynx: Oropharynx is clear  Posterior oropharyngeal erythema present  No oropharyngeal exudate  Eyes:      Extraocular Movements: Extraocular movements intact  Conjunctiva/sclera: Conjunctivae normal       Pupils: Pupils are equal, round, and reactive to light  Cardiovascular:      Rate and Rhythm: Normal rate and regular rhythm  Pulses: Normal pulses  Heart sounds: Normal heart sounds  No murmur heard  No friction rub  No gallop      Pulmonary:      Effort: No respiratory distress  Breath sounds: Normal breath sounds  No decreased breath sounds, wheezing, rhonchi or rales  Abdominal:      General: Bowel sounds are normal       Palpations: Abdomen is soft  Tenderness: There is no abdominal tenderness  Musculoskeletal:         General: No tenderness  Normal range of motion  Cervical back: Normal range of motion and neck supple  Skin:     General: Skin is warm and dry  Capillary Refill: Capillary refill takes less than 2 seconds  Neurological:      General: No focal deficit present  Mental Status: She is alert and oriented to person, place, and time     Psychiatric:         Mood and Affect: Mood normal          Behavior: Behavior normal

## 2022-01-24 LAB — SARS-COV-2 RNA RESP QL NAA+PROBE: POSITIVE

## 2022-01-26 ENCOUNTER — APPOINTMENT (OUTPATIENT)
Dept: PHYSICAL THERAPY | Facility: CLINIC | Age: 32
End: 2022-01-26
Payer: COMMERCIAL

## 2022-01-27 ENCOUNTER — APPOINTMENT (OUTPATIENT)
Dept: PHYSICAL THERAPY | Facility: CLINIC | Age: 32
End: 2022-01-27
Payer: COMMERCIAL

## 2022-05-06 ENCOUNTER — OFFICE VISIT (OUTPATIENT)
Dept: URGENT CARE | Age: 32
End: 2022-05-06
Payer: COMMERCIAL

## 2022-05-06 VITALS
BODY MASS INDEX: 36.05 KG/M2 | DIASTOLIC BLOOD PRESSURE: 69 MMHG | WEIGHT: 210 LBS | HEART RATE: 71 BPM | RESPIRATION RATE: 18 BRPM | TEMPERATURE: 97.7 F | SYSTOLIC BLOOD PRESSURE: 112 MMHG | OXYGEN SATURATION: 98 %

## 2022-05-06 DIAGNOSIS — J01.00 ACUTE NON-RECURRENT MAXILLARY SINUSITIS: Primary | ICD-10-CM

## 2022-05-06 PROCEDURE — 99213 OFFICE O/P EST LOW 20 MIN: CPT | Performed by: STUDENT IN AN ORGANIZED HEALTH CARE EDUCATION/TRAINING PROGRAM

## 2022-05-06 RX ORDER — ONDANSETRON 4 MG/1
4 TABLET, ORALLY DISINTEGRATING ORAL EVERY 6 HOURS PRN
Qty: 20 TABLET | Refills: 0 | Status: SHIPPED | OUTPATIENT
Start: 2022-05-06

## 2022-05-06 RX ORDER — AZITHROMYCIN 250 MG/1
TABLET, FILM COATED ORAL
Qty: 6 TABLET | Refills: 0 | Status: SHIPPED | OUTPATIENT
Start: 2022-05-06 | End: 2022-05-10

## 2022-05-06 NOTE — LETTER
May 6, 2022     Patient: Chantal Delgado   YOB: 1990   Date of Visit: 5/6/2022       To Whom it May Concern:    Theantonio Brice was seen in my clinic on 5/6/2022  She should be excused from work duties on 05/06/2022  If you have any questions or concerns, please don't hesitate to call           Sincerely,          Xiomara Merrill MD        CC: No Recipients

## 2022-05-06 NOTE — PROGRESS NOTES
[facility]        NAME: Diana Balbuena is a 28 y o  female  : 1990    MRN: [id number]  DATE: May 6, 2022  TIME: 9:30 AM    Assessment and Plan   Acute non-recurrent maxillary sinusitis [J01 00]  1  Acute non-recurrent maxillary sinusitis  azithromycin (ZITHROMAX) 250 mg tablet    ondansetron (Zofran ODT) 4 mg disintegrating tablet         Patient Instructions       Follow up with PCP in 3-5 days  Proceed to  ER if symptoms worsen  Chief Complaint     Chief Complaint   Patient presents with    Earache     ear pain, sinus congestion since yesterday  Took mucinex and pepto bismol for nausea  History of Present Illness       HPI patient presenting with sinus pain and congestion ongoing for a week, year pain that started yesterday  Patient has been taking Mucinex and Pepto-Bismol for her nausea and nasal symptoms with minimal relief  Patient does not have any fevers or chills, she states she has multiple sick contacts at work  She is vaccinated for COVID-19      Review of Systems   Review of Systems  Per hpi     Current Medications       Current Outpatient Medications:     busPIRone (BUSPAR) 5 mg tablet, Take 5 mg by mouth 3 (three) times a day, Disp: , Rfl:     norethindrone-ethinyl estradiol (JUNEL FE 1/20) 1-20 MG-MCG per tablet, Take 1 tablet by mouth daily  , Disp: , Rfl:     sertraline (ZOLOFT) 100 mg tablet, TAKE ONE TABLET (100 MG) BY MOUTH DAILY, Disp: , Rfl:     Sertraline HCl (ZOLOFT PO), Take 100 mg by mouth daily , Disp: , Rfl:     ALPRAZolam (XANAX PO), Take by mouth as needed, Disp: , Rfl:     azithromycin (ZITHROMAX) 250 mg tablet, Take 2 tablets today then 1 tablet daily x 4 days, Disp: 6 tablet, Rfl: 0    Clenpiq 10-3 5-12 MG-GM -GM/160ML SOLN, FOLLOW DIRECTIONS ON SHEET GIVEN (Patient not taking: Reported on 2022), Disp: , Rfl:     cyclobenzaprine (FEXMID) 7 5 MG tablet, Take 1 tablet (7 5 mg total) by mouth daily at bedtime as needed for muscle spasms (Patient not taking: Reported on 5/6/2022 ), Disp: 30 tablet, Rfl: 0    dicyclomine (BENTYL) 20 mg tablet, TAKE 1 TABLET (20 MG TOTAL) BY MOUTH EVERY 6 (SIX) HOURS AS NEEDED (ABDOMINAL CRAMPING) (Patient not taking: Reported on 5/6/2022 ), Disp: 90 tablet, Rfl: 3    docusate sodium (COLACE) 100 mg capsule, Take 1 capsule (100 mg total) by mouth every 12 (twelve) hours (Patient not taking: Reported on 5/6/2022 ), Disp: 60 capsule, Rfl: 0    Flovent  MCG/ACT inhaler, TAKE 1 PUFF BY MOUTH TWICE A DAY (Patient not taking: Reported on 5/6/2022), Disp: , Rfl:     Junel 1/20 1-20 MG-MCG per tablet, Take 1 tablet by mouth daily, Disp: , Rfl:     ondansetron (Zofran ODT) 4 mg disintegrating tablet, Take 1 tablet (4 mg total) by mouth every 6 (six) hours as needed for nausea or vomiting, Disp: 20 tablet, Rfl: 0    pantoprazole (PROTONIX) 40 mg tablet, Take 1 tablet (40 mg total) by mouth daily, Disp: 30 tablet, Rfl: 0    Current Allergies     Allergies as of 05/06/2022 - Reviewed 05/06/2022   Allergen Reaction Noted    Amoxicillin-pot clavulanate Vomiting and GI Intolerance 08/09/2019    Bee pollen Itching 04/25/2017    Pollen extract Itching 04/25/2017            The following portions of the patient's history were reviewed and updated as appropriate: allergies, current medications, past family history, past medical history, past social history, past surgical history and problem list      Past Medical History:   Diagnosis Date    Anemia     Anxiety     Asthma     Depression     Disease of thyroid gland     GERD (gastroesophageal reflux disease)     Varicella        Past Surgical History:   Procedure Laterality Date    COLONOSCOPY  2015    EGD  04/06/2021    THYROIDECTOMY, PARTIAL  2017    UPPER GASTROINTESTINAL ENDOSCOPY         Family History   Problem Relation Age of Onset    Hypertension Father     Arthritis Father     Anxiety disorder Father     Hyperlipidemia Father     Diabetes Maternal Grandfather     Breast cancer Paternal Grandmother     Cancer Paternal Grandmother         thyroid    Anxiety disorder Mother     Diabetes Mother     Depression Mother     Fibroids Mother     Infertility Mother     Sickle cell trait Brother     Heart murmur Brother     Heart murmur Son     Autism Brother     Bleeding Disorder Brother     Seizures Brother          Medications have been verified  Objective   /69   Pulse 71   Temp 97 7 °F (36 5 °C) (Temporal)   Resp 18   Wt 95 3 kg (210 lb)   SpO2 98%   BMI 36 05 kg/m²   No LMP recorded  (Menstrual status: Birth Control)  Physical Exam     Physical Exam  Constitutional:       General: She is not in acute distress  Appearance: Normal appearance  HENT:      Head: Normocephalic  Right Ear: Tympanic membrane normal       Ears:      Comments: L TM erytheam     Nose: No congestion or rhinorrhea  Mouth/Throat:      Mouth: Mucous membranes are moist       Pharynx: Posterior oropharyngeal erythema present  No oropharyngeal exudate  Comments: Tender max sinus   Eyes:      General:         Right eye: No discharge  Left eye: No discharge  Conjunctiva/sclera: Conjunctivae normal    Cardiovascular:      Rate and Rhythm: Normal rate and regular rhythm  Pulses: Normal pulses  Pulmonary:      Effort: Pulmonary effort is normal  No respiratory distress  Abdominal:      General: Abdomen is flat  There is no distension  Palpations: Abdomen is soft  Tenderness: There is no abdominal tenderness  Musculoskeletal:      Cervical back: Neck supple  Skin:     General: Skin is warm  Capillary Refill: Capillary refill takes less than 2 seconds  Neurological:      Mental Status: She is alert and oriented to person, place, and time

## 2022-06-05 ENCOUNTER — OFFICE VISIT (OUTPATIENT)
Dept: URGENT CARE | Age: 32
End: 2022-06-05
Payer: COMMERCIAL

## 2022-06-05 ENCOUNTER — APPOINTMENT (OUTPATIENT)
Dept: RADIOLOGY | Age: 32
End: 2022-06-05
Payer: COMMERCIAL

## 2022-06-05 VITALS
HEART RATE: 68 BPM | BODY MASS INDEX: 35.85 KG/M2 | TEMPERATURE: 97.4 F | OXYGEN SATURATION: 99 % | RESPIRATION RATE: 16 BRPM | WEIGHT: 210 LBS | SYSTOLIC BLOOD PRESSURE: 122 MMHG | HEIGHT: 64 IN | DIASTOLIC BLOOD PRESSURE: 86 MMHG

## 2022-06-05 DIAGNOSIS — S93.401A SPRAIN OF RIGHT ANKLE, UNSPECIFIED LIGAMENT, INITIAL ENCOUNTER: Primary | ICD-10-CM

## 2022-06-05 DIAGNOSIS — T14.90XA INJURY: ICD-10-CM

## 2022-06-05 PROCEDURE — 73610 X-RAY EXAM OF ANKLE: CPT

## 2022-06-05 PROCEDURE — 99213 OFFICE O/P EST LOW 20 MIN: CPT

## 2022-06-05 RX ORDER — IBUPROFEN 400 MG/1
200 TABLET ORAL EVERY 6 HOURS PRN
COMMUNITY

## 2022-06-05 NOTE — PROGRESS NOTES
[facility]        NAME: Marly Copeland is a 28 y o  female  : 1990    MRN: [id number]  DATE: 2022  TIME: 3:28 PM    Assessment and Plan   Sprain of right ankle, unspecified ligament, initial encounter [S93 401A]  1  Sprain of right ankle, unspecified ligament, initial encounter  XR ankle 3+ vw right   2  Injury  XR ankle 3+ vw right     X-ray of right ankle reviewed  No acute osseous abnormality noted  Awaiting final read per radiology department  Patient Instructions     Rest, ice, compression and elevation  Ibuprofen or Tylenol as needed for pain  Follow up with PCP in 3-5 days  Proceed to  ER if symptoms worsen  Chief Complaint     Chief Complaint   Patient presents with    Ankle Injury     Right ankle pain and swelling    had a minor fall yesterday    slipped on some water and landed on right knee    woke this am with continued pain  History of Present Illness       Patient presenting for evaluation of a right-sided ankle injury  Patient states that she slipped on water twisted her right ankle and fell on her right knee  She states the injury happened 1 day ago  She denies any knee pain at this time, but states that she is having severe right-sided ankle pain  She rates that pain 7/10, and describes it as a throbbing pain  She states that is exacerbated with applying pressure to the affected area  She states that she is having mild numbness and tingling over the right ankle  Patient denies any recent right ankle injuries  She states that she has been taking ibuprofen, and it is providing minimal relief of the pain  Ankle Injury         Review of Systems   Review of Systems   Constitutional: Negative for chills and fever  HENT: Negative for ear pain and sore throat  Eyes: Negative for pain and visual disturbance  Respiratory: Negative for cough and shortness of breath  Cardiovascular: Negative for chest pain and palpitations     Gastrointestinal: Negative for abdominal pain and vomiting  Genitourinary: Negative for dysuria and hematuria  Musculoskeletal: Positive for arthralgias (Right ankle), gait problem (Related to ankle pain) and joint swelling  Negative for back pain  Skin: Negative for color change and rash  Neurological: Negative for seizures and syncope  All other systems reviewed and are negative          Current Medications       Current Outpatient Medications:     ALPRAZolam (XANAX PO), Take by mouth as needed, Disp: , Rfl:     busPIRone (BUSPAR) 5 mg tablet, Take 5 mg by mouth 3 (three) times a day, Disp: , Rfl:     cyclobenzaprine (FEXMID) 7 5 MG tablet, Take 1 tablet (7 5 mg total) by mouth daily at bedtime as needed for muscle spasms, Disp: 30 tablet, Rfl: 0    Flovent  MCG/ACT inhaler, TAKE 1 PUFF BY MOUTH TWICE A DAY, Disp: , Rfl:     ibuprofen (MOTRIN) 400 mg tablet, Take 200 mg by mouth every 6 (six) hours as needed for mild pain, Disp: , Rfl:     Junel 1/20 1-20 MG-MCG per tablet, Take 1 tablet by mouth daily, Disp: , Rfl:     sertraline (ZOLOFT) 100 mg tablet, TAKE ONE TABLET (100 MG) BY MOUTH DAILY, Disp: , Rfl:     Clenpiq 10-3 5-12 MG-GM -GM/160ML SOLN, FOLLOW DIRECTIONS ON SHEET GIVEN (Patient not taking: Reported on 6/5/2022), Disp: , Rfl:     dicyclomine (BENTYL) 20 mg tablet, TAKE 1 TABLET (20 MG TOTAL) BY MOUTH EVERY 6 (SIX) HOURS AS NEEDED (ABDOMINAL CRAMPING) (Patient not taking: Reported on 6/5/2022), Disp: 90 tablet, Rfl: 3    docusate sodium (COLACE) 100 mg capsule, Take 1 capsule (100 mg total) by mouth every 12 (twelve) hours (Patient not taking: No sig reported), Disp: 60 capsule, Rfl: 0    norethindrone-ethinyl estradiol (JUNEL FE 1/20) 1-20 MG-MCG per tablet, Take 1 tablet by mouth daily   (Patient not taking: Reported on 6/5/2022), Disp: , Rfl:     ondansetron (Zofran ODT) 4 mg disintegrating tablet, Take 1 tablet (4 mg total) by mouth every 6 (six) hours as needed for nausea or vomiting (Patient not taking: Reported on 6/5/2022), Disp: 20 tablet, Rfl: 0    pantoprazole (PROTONIX) 40 mg tablet, Take 1 tablet (40 mg total) by mouth daily, Disp: 30 tablet, Rfl: 0    Sertraline HCl (ZOLOFT PO), Take 100 mg by mouth daily , Disp: , Rfl:     Current Allergies     Allergies as of 06/05/2022 - Reviewed 06/05/2022   Allergen Reaction Noted    Amoxicillin-pot clavulanate Vomiting and GI Intolerance 08/09/2019    Bee pollen Itching 04/25/2017    Pollen extract Itching 04/25/2017            The following portions of the patient's history were reviewed and updated as appropriate: allergies, current medications, past family history, past medical history, past social history, past surgical history and problem list      Past Medical History:   Diagnosis Date    Anemia     Anxiety     Asthma     Depression     Disease of thyroid gland     GERD (gastroesophageal reflux disease)     Varicella        Past Surgical History:   Procedure Laterality Date    COLONOSCOPY  2015    EGD  04/06/2021    THYROIDECTOMY, PARTIAL  2017    UPPER GASTROINTESTINAL ENDOSCOPY         Family History   Problem Relation Age of Onset    Hypertension Father     Arthritis Father     Anxiety disorder Father     Hyperlipidemia Father     Diabetes Maternal Grandfather     Breast cancer Paternal Grandmother     Cancer Paternal Grandmother         thyroid    Anxiety disorder Mother     Diabetes Mother     Depression Mother     Fibroids Mother     Infertility Mother     Sickle cell trait Brother     Heart murmur Brother     Heart murmur Son     Autism Brother     Bleeding Disorder Brother     Seizures Brother          Medications have been verified  Objective   /86   Pulse 68   Temp (!) 97 4 °F (36 3 °C)   Resp 16   Ht 5' 4" (1 626 m)   Wt 95 3 kg (210 lb)   SpO2 99%   BMI 36 05 kg/m²        Physical Exam     Physical Exam  Vitals and nursing note reviewed     Constitutional:       General: She is not in acute distress  Appearance: Normal appearance  She is not ill-appearing, toxic-appearing or diaphoretic  HENT:      Head: Normocephalic and atraumatic  Right Ear: Tympanic membrane normal       Left Ear: Tympanic membrane normal       Nose: Nose normal  No congestion or rhinorrhea  Mouth/Throat:      Mouth: Mucous membranes are moist       Pharynx: Oropharynx is clear  No oropharyngeal exudate or posterior oropharyngeal erythema  Eyes:      Extraocular Movements: Extraocular movements intact  Conjunctiva/sclera: Conjunctivae normal       Pupils: Pupils are equal, round, and reactive to light  Cardiovascular:      Rate and Rhythm: Normal rate and regular rhythm  Pulses: Normal pulses  Heart sounds: Normal heart sounds  No murmur heard  No friction rub  No gallop  Pulmonary:      Effort: Pulmonary effort is normal  No respiratory distress  Breath sounds: Normal breath sounds  No stridor  No wheezing, rhonchi or rales  Chest:      Chest wall: No tenderness  Abdominal:      General: Bowel sounds are normal       Palpations: Abdomen is soft  Tenderness: There is no abdominal tenderness  There is no guarding or rebound  Musculoskeletal:      Cervical back: Normal range of motion and neck supple  Right ankle: Swelling (Minimal swelling to the lateral malleolus) present  No deformity, ecchymosis or lacerations  Tenderness present over the lateral malleolus and posterior TF ligament  No medial malleolus, ATF ligament, AITF ligament, CF ligament, base of 5th metatarsal or proximal fibula tenderness  Decreased range of motion (Limited by pain)  Anterior drawer test negative  Normal pulse  Skin:     General: Skin is warm and dry  Capillary Refill: Capillary refill takes less than 2 seconds  Neurological:      General: No focal deficit present  Mental Status: She is alert and oriented to person, place, and time     Psychiatric:         Mood and Affect: Mood normal          Behavior: Behavior normal

## 2022-06-05 NOTE — PATIENT INSTRUCTIONS
There are no fractures noted on your x-ray  If there are any changes based on the radiologist's read, we will give you a call  In the meantime, we recommend rest, ice, compression and elevation  You may alternate ibuprofen and Tylenol as needed

## 2022-08-27 ENCOUNTER — APPOINTMENT (EMERGENCY)
Dept: CT IMAGING | Facility: HOSPITAL | Age: 32
End: 2022-08-27
Payer: COMMERCIAL

## 2022-08-27 ENCOUNTER — HOSPITAL ENCOUNTER (EMERGENCY)
Facility: HOSPITAL | Age: 32
Discharge: HOME | End: 2022-08-28
Attending: EMERGENCY MEDICINE
Payer: COMMERCIAL

## 2022-08-27 VITALS
OXYGEN SATURATION: 100 % | TEMPERATURE: 99.2 F | SYSTOLIC BLOOD PRESSURE: 138 MMHG | RESPIRATION RATE: 16 BRPM | HEART RATE: 81 BPM | DIASTOLIC BLOOD PRESSURE: 76 MMHG

## 2022-08-27 DIAGNOSIS — N39.0 UTI (URINARY TRACT INFECTION): ICD-10-CM

## 2022-08-27 DIAGNOSIS — R10.9 RIGHT FLANK PAIN: Primary | ICD-10-CM

## 2022-08-27 LAB
BASOPHILS # BLD AUTO: 0.11 THOUSANDS/ΜL (ref 0–0.1)
BASOPHILS NFR BLD AUTO: 1 % (ref 0–1)
EOSINOPHIL # BLD AUTO: 0.72 THOUSAND/ΜL (ref 0–0.61)
EOSINOPHIL NFR BLD AUTO: 5 % (ref 0–6)
ERYTHROCYTE [DISTWIDTH] IN BLOOD BY AUTOMATED COUNT: 13.2 % (ref 11.6–15.1)
EXT PREG TEST URINE: NEGATIVE
EXT. CONTROL ED NAV: NORMAL
HCT VFR BLD AUTO: 39.8 % (ref 34.8–46.1)
HGB BLD-MCNC: 13.7 G/DL (ref 11.5–15.4)
IMM GRANULOCYTES # BLD AUTO: 0.09 THOUSAND/UL (ref 0–0.2)
IMM GRANULOCYTES NFR BLD AUTO: 1 % (ref 0–2)
LYMPHOCYTES # BLD AUTO: 3.76 THOUSANDS/ΜL (ref 0.6–4.47)
LYMPHOCYTES NFR BLD AUTO: 25 % (ref 14–44)
MCH RBC QN AUTO: 29.9 PG (ref 26.8–34.3)
MCHC RBC AUTO-ENTMCNC: 34.4 G/DL (ref 31.4–37.4)
MCV RBC AUTO: 87 FL (ref 82–98)
MONOCYTES # BLD AUTO: 1.24 THOUSAND/ΜL (ref 0.17–1.22)
MONOCYTES NFR BLD AUTO: 8 % (ref 4–12)
NEUTROPHILS # BLD AUTO: 8.97 THOUSANDS/ΜL (ref 1.85–7.62)
NEUTS SEG NFR BLD AUTO: 60 % (ref 43–75)
NRBC BLD AUTO-RTO: 0 /100 WBCS
PLATELET # BLD AUTO: 343 THOUSANDS/UL (ref 149–390)
PMV BLD AUTO: 9.7 FL (ref 8.9–12.7)
RBC # BLD AUTO: 4.58 MILLION/UL (ref 3.81–5.12)
WBC # BLD AUTO: 14.89 THOUSAND/UL (ref 4.31–10.16)

## 2022-08-27 PROCEDURE — 96374 THER/PROPH/DIAG INJ IV PUSH: CPT

## 2022-08-27 PROCEDURE — 36415 COLL VENOUS BLD VENIPUNCTURE: CPT

## 2022-08-27 PROCEDURE — 99284 EMERGENCY DEPT VISIT MOD MDM: CPT | Performed by: EMERGENCY MEDICINE

## 2022-08-27 PROCEDURE — 96361 HYDRATE IV INFUSION ADD-ON: CPT

## 2022-08-27 PROCEDURE — 82550 ASSAY OF CK (CPK): CPT | Performed by: EMERGENCY MEDICINE

## 2022-08-27 PROCEDURE — 74176 CT ABD & PELVIS W/O CONTRAST: CPT

## 2022-08-27 PROCEDURE — 83690 ASSAY OF LIPASE: CPT | Performed by: EMERGENCY MEDICINE

## 2022-08-27 PROCEDURE — 81001 URINALYSIS AUTO W/SCOPE: CPT | Performed by: EMERGENCY MEDICINE

## 2022-08-27 PROCEDURE — 81025 URINE PREGNANCY TEST: CPT | Performed by: EMERGENCY MEDICINE

## 2022-08-27 PROCEDURE — 85025 COMPLETE CBC W/AUTO DIFF WBC: CPT | Performed by: EMERGENCY MEDICINE

## 2022-08-27 PROCEDURE — 80053 COMPREHEN METABOLIC PANEL: CPT | Performed by: EMERGENCY MEDICINE

## 2022-08-27 PROCEDURE — 87086 URINE CULTURE/COLONY COUNT: CPT | Performed by: EMERGENCY MEDICINE

## 2022-08-27 PROCEDURE — G1004 CDSM NDSC: HCPCS

## 2022-08-27 PROCEDURE — 99284 EMERGENCY DEPT VISIT MOD MDM: CPT

## 2022-08-27 RX ORDER — KETOROLAC TROMETHAMINE 30 MG/ML
30 INJECTION, SOLUTION INTRAMUSCULAR; INTRAVENOUS ONCE
Status: COMPLETED | OUTPATIENT
Start: 2022-08-27 | End: 2022-08-27

## 2022-08-27 RX ADMIN — SODIUM CHLORIDE 1000 ML: 0.9 INJECTION, SOLUTION INTRAVENOUS at 23:46

## 2022-08-27 RX ADMIN — KETOROLAC TROMETHAMINE 30 MG: 30 INJECTION, SOLUTION INTRAMUSCULAR at 23:48

## 2022-08-28 LAB
ALBUMIN SERPL BCP-MCNC: 4.5 G/DL (ref 3.5–5)
ALP SERPL-CCNC: 80 U/L (ref 34–104)
ALT SERPL W P-5'-P-CCNC: 13 U/L (ref 7–52)
ANION GAP SERPL CALCULATED.3IONS-SCNC: 10 MMOL/L (ref 4–13)
AST SERPL W P-5'-P-CCNC: 11 U/L (ref 13–39)
BACTERIA UR QL AUTO: ABNORMAL /HPF
BILIRUB SERPL-MCNC: 0.23 MG/DL (ref 0.2–1)
BILIRUB UR QL STRIP: NEGATIVE
BUN SERPL-MCNC: 16 MG/DL (ref 5–25)
CALCIUM SERPL-MCNC: 9.4 MG/DL (ref 8.4–10.2)
CHLORIDE SERPL-SCNC: 105 MMOL/L (ref 96–108)
CK SERPL-CCNC: 37 U/L (ref 26–192)
CLARITY UR: CLEAR
CO2 SERPL-SCNC: 24 MMOL/L (ref 21–32)
COLOR UR: ABNORMAL
CREAT SERPL-MCNC: 0.77 MG/DL (ref 0.6–1.3)
GFR SERPL CREATININE-BSD FRML MDRD: 103 ML/MIN/1.73SQ M
GLUCOSE SERPL-MCNC: 82 MG/DL (ref 65–140)
GLUCOSE UR STRIP-MCNC: NEGATIVE MG/DL
HGB UR QL STRIP.AUTO: ABNORMAL
HOLD SPECIMEN: NORMAL
KETONES UR STRIP-MCNC: NEGATIVE MG/DL
LEUKOCYTE ESTERASE UR QL STRIP: ABNORMAL
LIPASE SERPL-CCNC: 39 U/L (ref 11–82)
NITRITE UR QL STRIP: NEGATIVE
NON-SQ EPI CELLS URNS QL MICRO: ABNORMAL /HPF
PH UR STRIP.AUTO: 6 [PH]
POTASSIUM SERPL-SCNC: 3.6 MMOL/L (ref 3.5–5.3)
PROT SERPL-MCNC: 8.1 G/DL (ref 6.4–8.4)
PROT UR STRIP-MCNC: NEGATIVE MG/DL
RBC #/AREA URNS AUTO: ABNORMAL /HPF
SODIUM SERPL-SCNC: 139 MMOL/L (ref 135–147)
SP GR UR STRIP.AUTO: 1.02 (ref 1–1.03)
UROBILINOGEN UR STRIP-ACNC: <2 MG/DL
WBC #/AREA URNS AUTO: ABNORMAL /HPF

## 2022-08-28 RX ORDER — SULFAMETHOXAZOLE AND TRIMETHOPRIM 800; 160 MG/1; MG/1
1 TABLET ORAL 2 TIMES DAILY
Qty: 14 TABLET | Refills: 0 | Status: SHIPPED | OUTPATIENT
Start: 2022-08-28 | End: 2022-09-04

## 2022-08-28 RX ORDER — HYDROCODONE BITARTRATE AND ACETAMINOPHEN 5; 325 MG/1; MG/1
1 TABLET ORAL EVERY 6 HOURS PRN
Qty: 20 TABLET | Refills: 0 | Status: SHIPPED | OUTPATIENT
Start: 2022-08-28 | End: 2022-09-02

## 2022-08-28 RX ORDER — SULFAMETHOXAZOLE AND TRIMETHOPRIM 800; 160 MG/1; MG/1
1 TABLET ORAL ONCE
Status: COMPLETED | OUTPATIENT
Start: 2022-08-28 | End: 2022-08-28

## 2022-08-28 RX ADMIN — SULFAMETHOXAZOLE AND TRIMETHOPRIM 1 TABLET: 800; 160 TABLET ORAL at 02:03

## 2022-08-28 NOTE — ED PROVIDER NOTES
History  Chief Complaint   Patient presents with    Back Pain     Back pain since Thursday that is radiating to leg  Making it hard to eat/sleep/breathe  Patient is a 28year old female with constant worsening right flank pain with radiation to abdomen and down R leg for past few days  Patient states she drove here  No trauma  No numbness or weakness  No N/V/D  No urinary sx  Has not had a menstrual period in a long time since she has been on birth control  No fever  Was last seen at [facility] ED on 9/6/21 for chest pain  [facility] website checked on this patient and no Rx found  History provided by:  Patient   used: No    Back Pain  Associated symptoms: abdominal pain    Associated symptoms: no fever, no numbness and no weakness        Prior to Admission Medications   Prescriptions Last Dose Informant Patient Reported? Taking?    ALPRAZolam (XANAX PO)  Self Yes No   Sig: Take by mouth as needed   Clenpiq 10-3 5-12 MG-GM -GM/160ML SOLN  Self Yes No   Sig: FOLLOW DIRECTIONS ON SHEET GIVEN   Patient not taking: Reported on 6/5/2022   Flovent  MCG/ACT inhaler  Self Yes No   Sig: TAKE 1 PUFF BY MOUTH TWICE A DAY   Junel 1/20 1-20 MG-MCG per tablet  Self Yes No   Sig: Take 1 tablet by mouth daily   Sertraline HCl (ZOLOFT PO)  Self Yes No   Sig: Take 100 mg by mouth daily    busPIRone (BUSPAR) 5 mg tablet  Self Yes No   Sig: Take 5 mg by mouth 3 (three) times a day   cyclobenzaprine (FEXMID) 7 5 MG tablet   No No   Sig: Take 1 tablet (7 5 mg total) by mouth daily at bedtime as needed for muscle spasms   dicyclomine (BENTYL) 20 mg tablet  Self No No   Sig: TAKE 1 TABLET (20 MG TOTAL) BY MOUTH EVERY 6 (SIX) HOURS AS NEEDED (ABDOMINAL CRAMPING)   Patient not taking: Reported on 6/5/2022   docusate sodium (COLACE) 100 mg capsule  Self No No   Sig: Take 1 capsule (100 mg total) by mouth every 12 (twelve) hours   Patient not taking: No sig reported   ibuprofen (MOTRIN) 400 mg tablet   Yes No Sig: Take 200 mg by mouth every 6 (six) hours as needed for mild pain   norethindrone-ethinyl estradiol (JUNEL FE 1/20) 1-20 MG-MCG per tablet  Self Yes No   Sig: Take 1 tablet by mouth daily     Patient not taking: Reported on 6/5/2022   ondansetron (Zofran ODT) 4 mg disintegrating tablet   No No   Sig: Take 1 tablet (4 mg total) by mouth every 6 (six) hours as needed for nausea or vomiting   Patient not taking: Reported on 6/5/2022   pantoprazole (PROTONIX) 40 mg tablet   No No   Sig: Take 1 tablet (40 mg total) by mouth daily   sertraline (ZOLOFT) 100 mg tablet  Self Yes No   Sig: TAKE ONE TABLET (100 MG) BY MOUTH DAILY      Facility-Administered Medications: None       Past Medical History:   Diagnosis Date    Anemia     Anxiety     Asthma     Depression     Disease of thyroid gland     GERD (gastroesophageal reflux disease)     Varicella        Past Surgical History:   Procedure Laterality Date    COLONOSCOPY  2015    EGD  04/06/2021    THYROIDECTOMY, PARTIAL  2017    UPPER GASTROINTESTINAL ENDOSCOPY         Family History   Problem Relation Age of Onset    Hypertension Father     Arthritis Father     Anxiety disorder Father     Hyperlipidemia Father     Diabetes Maternal Grandfather     Breast cancer Paternal Grandmother     Cancer Paternal Grandmother         thyroid    Anxiety disorder Mother     Diabetes Mother     Depression Mother     Fibroids Mother     Infertility Mother     Sickle cell trait Brother     Heart murmur Brother     Heart murmur Son     Autism Brother     Bleeding Disorder Brother     Seizures Brother      I have reviewed and agree with the history as documented      E-Cigarette/Vaping    E-Cigarette Use Never User      E-Cigarette/Vaping Substances    Nicotine No     THC No     CBD No     Flavoring No     Other No     Unknown No      Social History     Tobacco Use    Smoking status: Current Some Day Smoker     Types: Cigarettes    Smokeless tobacco: Never Used    Tobacco comment: socially   Vaping Use    Vaping Use: Never used   Substance Use Topics    Alcohol use: Yes     Comment: 1 drink daily    Drug use: Never       Review of Systems   Constitutional: Negative for fever  Gastrointestinal: Positive for abdominal pain  Negative for diarrhea, nausea and vomiting  Genitourinary: Positive for flank pain  Negative for difficulty urinating  Musculoskeletal: Positive for back pain  Neurological: Negative for weakness and numbness  All other systems reviewed and are negative  Physical Exam  Physical Exam  Vitals and nursing note reviewed  Constitutional:       General: She is in acute distress (moderate)  HENT:      Head: Normocephalic and atraumatic  Mouth/Throat:      Comments: Mask in place  Eyes:      General: No scleral icterus  Cardiovascular:      Rate and Rhythm: Normal rate and regular rhythm  Heart sounds: Normal heart sounds  No murmur heard  Pulmonary:      Effort: Pulmonary effort is normal  No respiratory distress  Breath sounds: Normal breath sounds  Abdominal:      General: Bowel sounds are normal  There is no distension  Palpations: Abdomen is soft  Tenderness: There is no abdominal tenderness  There is right CVA tenderness  There is no guarding  Musculoskeletal:         General: No deformity  Cervical back: Normal range of motion and neck supple  Right lower leg: No edema  Left lower leg: No edema  Skin:     General: Skin is warm and dry  Findings: No erythema or rash  Neurological:      General: No focal deficit present  Mental Status: She is alert and oriented to person, place, and time     Psychiatric:         Mood and Affect: Mood normal          Vital Signs  ED Triage Vitals   Temperature Pulse Respirations Blood Pressure SpO2   08/27/22 2218 08/27/22 2218 08/27/22 2218 08/27/22 2218 08/27/22 2218   99 2 °F (37 3 °C) 81 16 138/76 100 %      Temp Source Heart Rate Source Patient Position - Orthostatic VS BP Location FiO2 (%)   08/27/22 2218 08/27/22 2218 08/27/22 2218 -- --   Oral Monitor Sitting        Pain Score       08/27/22 2348       10 - Worst Possible Pain           Vitals:    08/27/22 2218   BP: 138/76   Pulse: 81   Patient Position - Orthostatic VS: Sitting         Visual Acuity      ED Medications  Medications   sulfamethoxazole-trimethoprim (BACTRIM DS) 800-160 mg per tablet 1 tablet (has no administration in time range)   sodium chloride 0 9 % bolus 1,000 mL (1,000 mL Intravenous New Bag 8/27/22 2346)   ketorolac (TORADOL) injection 30 mg (30 mg Intravenous Given 8/27/22 2348)       Diagnostic Studies  Results Reviewed     Procedure Component Value Units Date/Time    Urine culture [[id number]] Collected: 08/27/22 2328    Lab Status: In process Specimen: Urine, Clean Catch Updated: 08/28/22 0130    Urine Microscopic [[id number]]  (Abnormal) Collected: 08/27/22 2328    Lab Status: Final result Specimen: Urine, Clean Catch Updated: 08/28/22 0123     RBC, UA None Seen /hpf      WBC, UA 4-10 /hpf      Epithelial Cells Occasional /hpf      Bacteria, UA Occasional /hpf     [place] draw [[id number]] Collected: 08/27/22 2330    Lab Status: Final result Specimen: Blood from Arm, Right Updated: 08/28/22 0101    Narrative: The following orders were created for panel order [place] draw  Procedure                               Abnormality         Status                     ---------                               -----------         ------                     Akanksha [place] Top on ZBKP[[id number]]                           Final result               Green / Black tube on RHEI[[id number]]                       Final result                 Please view results for these tests on the individual orders      CK Total with Reflex CKMB [[id number]]  (Normal) Collected: 08/27/22 2328    Lab Status: Final result Specimen: Blood from Arm, Right Updated: 08/28/22 0059     Total CK 37 U/L     UA (URINE) with reflex to Scope [[id number]]  (Abnormal) Collected: 08/27/22 2328    Lab Status: Final result Specimen: Urine, Clean Catch Updated: 08/28/22 0014     Color, UA Light Yellow     Clarity, UA Clear     Specific Gravity, UA 1 018     pH, UA 6 0     Leukocytes, UA Small     Nitrite, UA Negative     Protein, UA Negative mg/dl      Glucose, UA Negative mg/dl      Ketones, UA Negative mg/dl      Urobilinogen, UA <2 0 mg/dl      Bilirubin, UA Negative     Occult Blood, UA Trace    Comprehensive metabolic panel [[id number]]  (Abnormal) Collected: 08/27/22 2328    Lab Status: Final result Specimen: Blood from Arm, Right Updated: 08/28/22 0004     Sodium 139 mmol/L      Potassium 3 6 mmol/L      Chloride 105 mmol/L      CO2 24 mmol/L      ANION GAP 10 mmol/L      BUN 16 mg/dL      Creatinine 0 77 mg/dL      Glucose 82 mg/dL      Calcium 9 4 mg/dL      AST 11 U/L      ALT 13 U/L      Alkaline Phosphatase 80 U/L      Total Protein 8 1 g/dL      Albumin 4 5 g/dL      Total Bilirubin 0 23 mg/dL      eGFR 103 ml/min/1 73sq m     Narrative:      [place] guidelines for Chronic Kidney Disease (CKD):     Stage 1 with normal or high GFR (GFR > 90 mL/min/1 73 square meters)    Stage 2 Mild CKD (GFR = 60-89 mL/min/1 73 square meters)    Stage 3A Moderate CKD (GFR = 45-59 mL/min/1 73 square meters)    Stage 3B Moderate CKD (GFR = 30-44 mL/min/1 73 square meters)    Stage 4 Severe CKD (GFR = 15-29 mL/min/1 73 square meters)    Stage 5 End Stage CKD (GFR <15 mL/min/1 73 square meters)  Note: GFR calculation is accurate only with a steady state creatinine    Lipase [[id number]]  (Normal) Collected: 08/27/22 2328    Lab Status: Final result Specimen: Blood from Arm, Right Updated: 08/28/22 0004     Lipase 39 u/L     CBC and differential [[id number]]  (Abnormal) Collected: 08/27/22 2328    Lab Status: Final result Specimen: Blood from Arm, Right Updated: 08/27/22 5077     WBC 14 89 Thousand/uL      RBC 4 58 Million/uL      Hemoglobin 13 7 g/dL      Hematocrit 39 8 %      MCV 87 fL      MCH 29 9 pg      MCHC 34 4 g/dL      RDW 13 2 %      MPV 9 7 fL      Platelets 409 Thousands/uL      nRBC 0 /100 WBCs      Neutrophils Relative 60 %      Immat GRANS % 1 %      Lymphocytes Relative 25 %      Monocytes Relative 8 %      Eosinophils Relative 5 %      Basophils Relative 1 %      Neutrophils Absolute 8 97 Thousands/µL      Immature Grans Absolute 0 09 Thousand/uL      Lymphocytes Absolute 3 76 Thousands/µL      Monocytes Absolute 1 24 Thousand/µL      Eosinophils Absolute 0 72 Thousand/µL      Basophils Absolute 0 11 Thousands/µL     POCT pregnancy, urine [[id number]]  (Normal) Resulted: 08/27/22 2337    Lab Status: Final result Specimen: Urine Updated: 08/27/22 2337     EXT PREG TEST UR (Ref: Negative) negative     Control Valid                 CT renal stone study abdomen pelvis without contrast   ED Interpretation by Virginia Akbar MD (08/28 0055)      URINARY TRACT FINDINGS:     RIGHT KIDNEY AND URETER:  No urinary tract calculi  No hydronephrosis or hydroureter      LEFT KIDNEY AND URETER:  No urinary tract calculi  No hydronephrosis or hydroureter      URINARY BLADDER:  Unremarkable         ADDITIONAL FINDINGS:     LOWER CHEST:  No clinically significant abnormality identified in the visualized lower chest      SOLID VISCERA: Limited low radiation dose noncontrast CT evaluation demonstrates no clinically significant abnormality of the imaged portions of the liver, spleen, pancreas, or adrenal glands        GALLBLADDER/BILIARY TREE:  No calcified gallstones  No pericholecystic inflammatory change  No biliary dilatation      STOMACH AND BOWEL:  Unremarkable      APPENDIX:  No findings to suggest appendicitis      ABDOMINOPELVIC CAVITY:  No ascites  No pneumoperitoneum    No lymphadenopathy      REPRODUCTIVE ORGANS:  Unremarkable for patient's age      ABDOMINAL WALL/INGUINAL REGIONS: Unremarkable      OSSEOUS STRUCTURES:  No    acute fracture or destructive osseous lesion      IMPRESSION:     No acute intra-abdominal abnormality  No free air or free fluid      No hydronephrosis or intrarenal calculus      Workstation performed: [id number]      Final Result by Wesley Joiner MD (08/28 0832)      No acute intra-abdominal abnormality  No free air or free fluid  No hydronephrosis or intrarenal calculus  Workstation performed: [id number]                    Procedures  Procedures         ED Course  ED Course as of 08/28/22 0155   Sun Aug 28, 2022   0150 Labs and CT d/w patient  SBIRT 20yo+    Flowsheet Row Most Recent Value   SBIRT (25 yo +)    In order to provide better care to our patients, we are screening all of our patients for alcohol and drug use  Would it be okay to ask you these screening questions? Unable to answer at this time Filed at: 08/27/2022 2324                    MDM  Number of Diagnoses or Management Options  Diagnosis management comments: DDx including but not limited to: renal colic, pyelonephritis, UTI, GI etiology, appendicitis, diverticulitis, pancreatitis, cholecystitis, biliary colic, rhabdomyolysis, tumor, zoster, sciatica, arthritis, herniated disc; doubt cauda equina syndrome or epidural abscess or fx          Amount and/or Complexity of Data Reviewed  Clinical lab tests: ordered and reviewed  Tests in the radiology section of CPT®: ordered and reviewed  Decide to obtain previous medical records or to obtain history from someone other than the patient: yes  Review and summarize past medical records: yes  Independent visualization of images, tracings, or specimens: yes        Disposition  Final diagnoses:   Right flank pain   UTI (urinary tract infection)     Time reflects when diagnosis was documented in both MDM as applicable and the Disposition within this note     Time User Action Codes Description Comment    8/28/2022 1:51 AM Cherl Postal Add [R10 9] Right flank pain     8/28/2022  1:51 AM Cherl Postal Add [N39 0] UTI (urinary tract infection)       ED Disposition     ED Disposition   Discharge    Condition   Stable    Date/Time   Sun Aug 28, 2022  1:51 AM    Comment   Adrienne Waller discharge to home/self care  Follow-up Information     Follow up With Specialties Details Why Contact Info    Apoorva Nixon MD Family Medicine Call in 1 day Return sooner if increased pain, fever, vomiting, diarrhea, rash, difficulty urinating  No driving with norco  Do not use acetaminophen with norco  [address]            Patient's Medications   Discharge Prescriptions    HYDROCODONE-ACETAMINOPHEN (NORCO) 5-325 MG PER TABLET    Take 1 tablet by mouth every 6 (six) hours as needed for pain for up to 5 days Max Daily Amount: 4 tablets       Start Date: 8/28/2022 End Date: 9/2/2022       Order Dose: 1 tablet       Quantity: 20 tablet    Refills: 0    SULFAMETHOXAZOLE-TRIMETHOPRIM (BACTRIM DS) 800-160 MG PER TABLET    Take 1 tablet by mouth 2 (two) times a day for 7 days       Start Date: 8/28/2022 End Date: 9/4/2022       Order Dose: 1 tablet       Quantity: 14 tablet    Refills: 0       No discharge procedures on file      PDMP Review       Value Time User    PDMP Reviewed  Yes 8/27/2022 11:34 PM Nehemias Espinal MD          ED Provider  Electronically Signed by           Nehemias Espinal MD  08/28/22 6821

## 2022-08-29 LAB — BACTERIA UR CULT: NORMAL

## 2022-12-31 ENCOUNTER — OFFICE VISIT (OUTPATIENT)
Dept: URGENT CARE | Age: 32
End: 2022-12-31

## 2022-12-31 VITALS
SYSTOLIC BLOOD PRESSURE: 110 MMHG | RESPIRATION RATE: 18 BRPM | DIASTOLIC BLOOD PRESSURE: 76 MMHG | HEART RATE: 84 BPM | OXYGEN SATURATION: 98 % | TEMPERATURE: 98.2 F

## 2022-12-31 DIAGNOSIS — R68.89 FLU-LIKE SYMPTOMS: Primary | ICD-10-CM

## 2022-12-31 RX ORDER — BENZONATATE 200 MG/1
200 CAPSULE ORAL 3 TIMES DAILY PRN
Qty: 20 CAPSULE | Refills: 0 | Status: SHIPPED | OUTPATIENT
Start: 2022-12-31

## 2022-12-31 NOTE — PROGRESS NOTES
[facility]        NAME: Dorinda Her is a 33 y o  female  : 1990    MRN: [id number]  DATE: 2022  TIME: 1:13 PM    Assessment and Plan   Flu-like symptoms [R68 89]  1  Flu-like symptoms  Cov/Flu-Collected at [facility] or [facility]    benzonatate (TESSALON) 200 MG capsule            Patient Instructions     Flu and covid tested  Advil or Tylenol OTC prn   Peptobismuth OTC prn for diarrhea  Follow up with PCP in 3-5 days  Proceed to  ER if symptoms worsen  Chief Complaint     Chief Complaint   Patient presents with   • Cold Like Symptoms   • Cough   • Dizziness   • Chills     Patient states that Thursday she started with a cough and yesterday with all the other symptoms         History of Present Illness       HPI   Reports chills, diarrhea starting yesterday  Cough x 2 days  Review of Systems   Review of Systems   Constitutional: Positive for chills  Negative for fever  HENT: Positive for congestion and rhinorrhea  Negative for sore throat  Respiratory: Positive for cough  Negative for shortness of breath and wheezing  Cardiovascular: Negative for chest pain  Gastrointestinal: Positive for diarrhea  Neurological: Positive for light-headedness           Current Medications       Current Outpatient Medications:   •  benzonatate (TESSALON) 200 MG capsule, Take 1 capsule (200 mg total) by mouth 3 (three) times a day as needed for cough, Disp: 20 capsule, Rfl: 0  •  ALPRAZolam (XANAX PO), Take by mouth as needed, Disp: , Rfl:   •  busPIRone (BUSPAR) 5 mg tablet, Take 5 mg by mouth 3 (three) times a day, Disp: , Rfl:   •  Clenpiq 10-3 5-12 MG-GM -GM/160ML SOLN, FOLLOW DIRECTIONS ON SHEET GIVEN (Patient not taking: Reported on 2022), Disp: , Rfl:   •  cyclobenzaprine (FEXMID) 7 5 MG tablet, Take 1 tablet (7 5 mg total) by mouth daily at bedtime as needed for muscle spasms, Disp: 30 tablet, Rfl: 0  •  dicyclomine (BENTYL) 20 mg tablet, TAKE 1 TABLET (20 MG TOTAL) BY MOUTH EVERY 6 (SIX) HOURS AS NEEDED (ABDOMINAL CRAMPING) (Patient not taking: Reported on 6/5/2022), Disp: 90 tablet, Rfl: 3  •  docusate sodium (COLACE) 100 mg capsule, Take 1 capsule (100 mg total) by mouth every 12 (twelve) hours (Patient not taking: No sig reported), Disp: 60 capsule, Rfl: 0  •  Flovent  MCG/ACT inhaler, TAKE 1 PUFF BY MOUTH TWICE A DAY, Disp: , Rfl:   •  ibuprofen (MOTRIN) 400 mg tablet, Take 200 mg by mouth every 6 (six) hours as needed for mild pain, Disp: , Rfl:   •  Junel 1/20 1-20 MG-MCG per tablet, Take 1 tablet by mouth daily, Disp: , Rfl:   •  norethindrone-ethinyl estradiol (JUNEL FE 1/20) 1-20 MG-MCG per tablet, Take 1 tablet by mouth daily   (Patient not taking: Reported on 6/5/2022), Disp: , Rfl:   •  ondansetron (Zofran ODT) 4 mg disintegrating tablet, Take 1 tablet (4 mg total) by mouth every 6 (six) hours as needed for nausea or vomiting (Patient not taking: Reported on 6/5/2022), Disp: 20 tablet, Rfl: 0  •  pantoprazole (PROTONIX) 40 mg tablet, Take 1 tablet (40 mg total) by mouth daily, Disp: 30 tablet, Rfl: 0  •  sertraline (ZOLOFT) 100 mg tablet, TAKE ONE TABLET (100 MG) BY MOUTH DAILY, Disp: , Rfl:   •  Sertraline HCl (ZOLOFT PO), Take 100 mg by mouth daily , Disp: , Rfl:     Current Allergies     Allergies as of 12/31/2022 - Reviewed 12/31/2022   Allergen Reaction Noted   • Amoxicillin-pot clavulanate Vomiting and GI Intolerance 08/09/2019   • Bee pollen Itching 04/25/2017   • Pollen extract Itching 04/25/2017            The following portions of the patient's history were reviewed and updated as appropriate: allergies, current medications, past family history, past medical history, past social history, past surgical history and problem list      Past Medical History:   Diagnosis Date   • Anemia    • Anxiety    • Asthma    • Depression    • Disease of thyroid gland    • GERD (gastroesophageal reflux disease)    • Varicella        Past Surgical History:   Procedure Laterality Date   • COLONOSCOPY  2015   • EGD  04/06/2021   • THYROIDECTOMY, PARTIAL  2017   • UPPER GASTROINTESTINAL ENDOSCOPY         Family History   Problem Relation Age of Onset   • Hypertension Father    • Arthritis Father    • Anxiety disorder Father    • Hyperlipidemia Father    • Diabetes Maternal Grandfather    • Breast cancer Paternal Grandmother    • Cancer Paternal Grandmother         thyroid   • Anxiety disorder Mother    • Diabetes Mother    • Depression Mother    • Fibroids Mother    • Infertility Mother    • Sickle cell trait Brother    • Heart murmur Brother    • Heart murmur Son    • Autism Brother    • Bleeding Disorder Brother    • Seizures Brother          Medications have been verified  Objective   /76 (BP Location: Right arm, Patient Position: Sitting, Cuff Size: Standard)   Pulse 84   Temp 98 2 °F (36 8 °C)   Resp 18   SpO2 98%   No LMP recorded  Physical Exam     Physical Exam  Constitutional:       Appearance: She is not ill-appearing or diaphoretic  HENT:      Right Ear: Tympanic membrane normal       Left Ear: Tympanic membrane normal       Nose: Rhinorrhea present  Mouth/Throat:      Mouth: Mucous membranes are moist       Pharynx: No posterior oropharyngeal erythema  Cardiovascular:      Rate and Rhythm: Regular rhythm  Heart sounds: Normal heart sounds  Pulmonary:      Effort: Pulmonary effort is normal       Breath sounds: Normal breath sounds  No wheezing

## 2023-01-01 LAB
FLUAV RNA RESP QL NAA+PROBE: NEGATIVE
FLUBV RNA RESP QL NAA+PROBE: NEGATIVE
SARS-COV-2 RNA RESP QL NAA+PROBE: POSITIVE

## 2023-05-14 NOTE — PROGRESS NOTES
Assessment/Plan:  Pap with high risk HPV testing every 5 years, if normal  Collected today  Hydrocortisone to breast skin changed twice daily x 1 weeks  Left breast US ordered  Check insurance coverage first    Sexually transmitted infection testing as indicated  Declined  Exercise most days of week-minimum of 150 minutes per week  Obtain appropriate diet and hydration  Consider start of prenatal vitamin to prevent neural tube defect  Calcium 1000 mg (in divided doses-max 600 mg at one time) + 600 vit D daily  Colonoscopy due 2026  Birth control refilled as requested and sent to pharmacy on file  Take as directed (ACHES reviewed)  Benefits, risks and alternatives discussed/reviewed  Discontinue when pregnancy is desired  HPV 9 vaccine series completed  Annual mammogram starting at age 37, monthly breast self exam recommended  Kegels 20 times twice daily  Treatment for menstrual cramps/bleeding- Ibuprofen 600 mg by mouth with onset of bleeding or cramping, whichever is first  Take second dose of ibuprofen  400 mg by mouth with food and repeat every 6 hours x 3 days  Return to office in one year or sooner, if needed  1  Encntr for gyn exam (general) (routine) w/o abn findings  -     Liquid-based pap, screening    2  Encounter for Papanicolaou smear for cervical cancer screening    3  Encounter for surveillance of contraceptive pills  -     norethindrone-ethinyl estradiol (JUNEL FE 1/20) 1-20 MG-MCG per tablet; Take one active pill po daily x 9 weeks then one week of inactive pills x one week  4  Breast skin changes  -     US breast left limited (diagnostic); Future    5  BMI 35 0-35 9,adult               Subjective:      Patient ID: Marii Taylor is a 33 y o  female  HPI    Marii Taylor is a 33 y o  Makayla Peed female who is here today for her annual visit  No gynecologic health concerns  Hx of benign thyroid tumor  Partial thyroidectomy  Extended cycles on her BIANKA   Bleeds Q 3 months "x 7 days with mod to heavy flow  Menses is not acceptable due to lower pelvic cramping that starts menses day one x 3 days  Motrin taken and is slightly effective  Exercise- not regularly  Smokes \"a couple puffs\" 5 times per month  Isak Anguiano is sexually active with male partner/ boyfriend of 13 years  Monogamous and feels safe in this relationship  Denies vaginal pain,bleeding or dryness  She uses BIANKA  for contraception  She is not interested in STD screening today  She denies vaginal discharge, itching or pelvic pain  She has no urinary concerns, does not have incontinence  No bowel concerns  Admits to left breast itching x 1 5 months     Last pap: 11/19/19 normal  DEXA scan: N/A  Mammogram: N/A;  Mom has hx of breast cancer x 1 year; dx at age 61   Colonoscopy: 4/6/21 normal with polyp; Recall in 5 years  HPV vaccine series: States completed  The following portions of the patient's history were reviewed and updated as appropriate: allergies, current medications, past family history, past medical history, past social history, past surgical history and problem list     Review of Systems   Constitutional: Negative  Negative for activity change, appetite change, chills, diaphoresis, fatigue, fever and unexpected weight change  HENT: Negative for congestion, dental problem, sneezing, sore throat and trouble swallowing  Eyes: Negative for visual disturbance  Respiratory: Negative for chest tightness and shortness of breath  Cardiovascular: Negative for chest pain and leg swelling  Gastrointestinal: Negative for abdominal pain, constipation, diarrhea, nausea and vomiting  Genitourinary: Positive for menstrual problem  Negative for difficulty urinating, dyspareunia, dysuria, frequency, hematuria, pelvic pain, urgency, vaginal bleeding, vaginal discharge and vaginal pain  Musculoskeletal: Negative for back pain and neck pain  Skin: Negative           Left breast pruritis " "  Allergic/Immunologic: Negative  Neurological: Negative for weakness and headaches  Hematological: Negative for adenopathy  Psychiatric/Behavioral: Negative  Objective:      /78 (BP Location: Left arm, Patient Position: Sitting, Cuff Size: Large)   Ht 5' 5 5\" (1 664 m)   Wt 98 kg (216 lb)   BMI 35 40 kg/m²          Physical Exam  Vitals and nursing note reviewed  Constitutional:       Appearance: Normal appearance  She is well-developed  She is obese  HENT:      Head: Normocephalic and atraumatic  Eyes:      General:         Right eye: No discharge  Left eye: No discharge  Neck:      Thyroid: No thyromegaly  Trachea: Trachea normal    Cardiovascular:      Rate and Rhythm: Normal rate and regular rhythm  Heart sounds: Normal heart sounds  Pulmonary:      Effort: Pulmonary effort is normal       Breath sounds: Normal breath sounds  Chest:   Breasts:     Breasts are symmetrical       Right: Normal  No inverted nipple, mass, nipple discharge, skin change or tenderness  Left: Skin change present  No inverted nipple, mass, nipple discharge or tenderness  Comments: Left nipple skin change; 5 mm round area slightly raised  Abdominal:      Palpations: Abdomen is soft  Genitourinary:     General: Normal vulva  Exam position: Lithotomy position  Labia:         Right: No rash, tenderness, lesion or injury  Left: No rash, tenderness, lesion or injury  Urethra: No prolapse, urethral pain, urethral swelling or urethral lesion  Vagina: Normal  No signs of injury and foreign body  No vaginal discharge, erythema, tenderness or bleeding  Cervix: Normal       Uterus: Normal        Adnexa:         Right: No mass, tenderness or fullness  Left: No mass, tenderness or fullness  Rectum: No external hemorrhoid  Comments: Mod eversion  Vaginal tone 2/5  Musculoskeletal:         General: Normal range of motion   " Cervical back: Normal range of motion and neck supple  Lymphadenopathy:      Head:      Right side of head: No submental, submandibular or tonsillar adenopathy  Left side of head: No submental, submandibular or tonsillar adenopathy  Cervical: No cervical adenopathy  Upper Body:      Right upper body: No supraclavicular or axillary adenopathy  Left upper body: No supraclavicular or axillary adenopathy  Lower Body: No right inguinal adenopathy  No left inguinal adenopathy  Skin:     General: Skin is warm and dry  Neurological:      Mental Status: She is alert and oriented to person, place, and time     Psychiatric:         Mood and Affect: Mood normal          Behavior: Behavior normal

## 2023-05-15 ENCOUNTER — ANNUAL EXAM (OUTPATIENT)
Dept: OBGYN CLINIC | Facility: CLINIC | Age: 33
End: 2023-05-15

## 2023-05-15 VITALS
DIASTOLIC BLOOD PRESSURE: 78 MMHG | BODY MASS INDEX: 34.72 KG/M2 | SYSTOLIC BLOOD PRESSURE: 112 MMHG | HEIGHT: 66 IN | WEIGHT: 216 LBS

## 2023-05-15 DIAGNOSIS — Z12.4 ENCOUNTER FOR PAPANICOLAOU SMEAR FOR CERVICAL CANCER SCREENING: ICD-10-CM

## 2023-05-15 DIAGNOSIS — R23.4 BREAST SKIN CHANGES: ICD-10-CM

## 2023-05-15 DIAGNOSIS — Z30.41 ENCOUNTER FOR SURVEILLANCE OF CONTRACEPTIVE PILLS: ICD-10-CM

## 2023-05-15 DIAGNOSIS — Z01.419 ENCNTR FOR GYN EXAM (GENERAL) (ROUTINE) W/O ABN FINDINGS: Primary | ICD-10-CM

## 2023-05-15 RX ORDER — DESLORATADINE 5 MG/1
TABLET ORAL
COMMUNITY
Start: 2023-05-02

## 2023-05-15 RX ORDER — ERGOCALCIFEROL 1.25 MG/1
CAPSULE ORAL
COMMUNITY
Start: 2023-03-28

## 2023-05-15 RX ORDER — NORETHINDRONE ACETATE AND ETHINYL ESTRADIOL 1MG-20(21)
KIT ORAL
Qty: 84 TABLET | Refills: 3 | Status: SHIPPED | OUTPATIENT
Start: 2023-05-15

## 2023-05-15 RX ORDER — FLUTICASONE PROPIONATE 50 MCG
SPRAY, SUSPENSION (ML) NASAL
COMMUNITY
Start: 2023-02-07

## 2023-05-15 NOTE — PATIENT INSTRUCTIONS
Pap with high risk HPV testing every 5 years, if normal  Collected today  Hydrocortisone to breast skin changed twice daily x 1 weeks  Left breast US ordered  Check insurance coverage first    Sexually transmitted infection testing as indicated  Declined  Exercise most days of week-minimum of 150 minutes per week  Obtain appropriate diet and hydration  Consider start of prenatal vitamin to prevent neural tube defect  Calcium 1000 mg (in divided doses-max 600 mg at one time) + 600 vit D daily  Colonoscopy due 2026  Birth control refilled as requested and sent to pharmacy on file  Take as directed (ACHES reviewed)  Benefits, risks and alternatives discussed/reviewed  Discontinue when pregnancy is desired  HPV 9 vaccine series completed  Annual mammogram starting at age 37, monthly breast self exam recommended  Kegels 20 times twice daily  Treatment for menstrual cramps/bleeding- Ibuprofen 600 mg by mouth with onset of bleeding or cramping, whichever is first  Take second dose of ibuprofen  400 mg by mouth with food and repeat every 6 hours x 3 days  Return to office in one year or sooner, if needed

## 2023-05-17 LAB
HPV HR 12 DNA CVX QL NAA+PROBE: NEGATIVE
HPV16 DNA CVX QL NAA+PROBE: NEGATIVE
HPV18 DNA CVX QL NAA+PROBE: NEGATIVE

## 2023-05-19 LAB
LAB AP GYN PRIMARY INTERPRETATION: NORMAL
Lab: NORMAL

## 2023-09-05 ENCOUNTER — APPOINTMENT (EMERGENCY)
Dept: RADIOLOGY | Facility: HOSPITAL | Age: 33
End: 2023-09-05
Payer: COMMERCIAL

## 2023-09-05 ENCOUNTER — HOSPITAL ENCOUNTER (EMERGENCY)
Facility: HOSPITAL | Age: 33
Discharge: HOME | End: 2023-09-06
Attending: EMERGENCY MEDICINE
Payer: COMMERCIAL

## 2023-09-05 VITALS
SYSTOLIC BLOOD PRESSURE: 120 MMHG | TEMPERATURE: 98.5 F | DIASTOLIC BLOOD PRESSURE: 78 MMHG | OXYGEN SATURATION: 100 % | HEART RATE: 88 BPM | RESPIRATION RATE: 18 BRPM

## 2023-09-05 DIAGNOSIS — M54.50 ACUTE LEFT-SIDED LOW BACK PAIN WITHOUT SCIATICA: ICD-10-CM

## 2023-09-05 DIAGNOSIS — S89.92XA INJURY OF LEFT KNEE, INITIAL ENCOUNTER: Primary | ICD-10-CM

## 2023-09-05 PROCEDURE — 72100 X-RAY EXAM L-S SPINE 2/3 VWS: CPT

## 2023-09-05 PROCEDURE — 73502 X-RAY EXAM HIP UNI 2-3 VIEWS: CPT

## 2023-09-05 PROCEDURE — 99284 EMERGENCY DEPT VISIT MOD MDM: CPT | Performed by: EMERGENCY MEDICINE

## 2023-09-05 PROCEDURE — 73564 X-RAY EXAM KNEE 4 OR MORE: CPT

## 2023-09-05 PROCEDURE — 99283 EMERGENCY DEPT VISIT LOW MDM: CPT

## 2023-09-05 RX ORDER — LIDOCAINE 50 MG/G
1 PATCH TOPICAL ONCE
Status: DISCONTINUED | OUTPATIENT
Start: 2023-09-05 | End: 2023-09-06 | Stop reason: HOSPADM

## 2023-09-05 RX ORDER — ACETAMINOPHEN 325 MG/1
975 TABLET ORAL ONCE
Status: COMPLETED | OUTPATIENT
Start: 2023-09-05 | End: 2023-09-06

## 2023-09-05 NOTE — Clinical Note
Gabi Horne was seen and treated in our emergency department on 9/5/2023. Limited use of left leg    Diagnosis:     Celi  . She may return on this date: 09/08/2023    Michelle Stubbs was seen in the ED on 09/06/23. Please excuse her from work for today and tomorrow as she gets better       If you have any questions or concerns, please don't hesitate to call.       Isis Reeves MD    ______________________________           _______________          _______________  Hospital Representative                              Date                                Time

## 2023-09-06 RX ADMIN — ACETAMINOPHEN 975 MG: 325 TABLET, FILM COATED ORAL at 00:16

## 2023-09-06 RX ADMIN — LIDOCAINE 1 PATCH: 700 PATCH TOPICAL at 00:17

## 2023-09-06 NOTE — DISCHARGE INSTRUCTIONS
Ibuprofen (brand name Advil) and Acetaminophen (brand name Tylenol) work through slightly different mechanisms and work well together. They can be taken together for a better effect. The recommendation is to take up to 400mg ibuprofen then 4 hours later take up to 1,000mg Tylenol. The goal is that when the ibuprofen starts to fade, the acetaminophen is still working. The ibuprofen can be taken every 6 hours (up to 4 times per day), the acetaminophen every 8 hours (up to 3 times per day). Ibuprofen should not be taken on an empty stomach, so try to have at least something small to eat before taking it. Tylenol can be taken on an empty stomach.   Medications such as NyQuil, DayQuil, Percocet all contain Tylenol and should not be taken with additional Tylenol

## 2023-09-06 NOTE — ED ATTENDING ATTESTATION
9/5/2023  I, Mandy Finney MD, saw and evaluated the patient. I have discussed the patient with the resident/non-physician practitioner and agree with the resident's/non-physician practitioner's findings, Plan of Care, and MDM as documented in the resident's/non-physician practitioner's note, except where noted. All available labs and Radiology studies were reviewed. I was present for key portions of any procedure(s) performed by the resident/non-physician practitioner and I was immediately available to provide assistance. At this point I agree with the current assessment done in the Emergency Department. I have conducted an independent evaluation of this patient a history and physical is as follows: This is a 33 y.o. old female who presents to the ED for evaluation of Knee pain. L knee injury. Hx congenital L hip abnormality. Did not fall. No pain meds. Did twist it trying to catch herself. VS and nursing notes reviewed  General: Appears in NAD, awake, alert, speaking normally in full sentences. Well-nourished, well-developed. Appears stated age. Head: Normocephalic, atraumatic. Eyes: EOMI. Vision grossly normal. No subconjunctival hemorrhages or occular discharge noted. Symmetrical lids. ENT: Atraumatic external nose and ears. No stridor. Normal phonation. No drooling. Normal swallowing. Neck: No JVD. FROM. No goiter  CV: No pallor. Normal rate. Lungs: No tachypnea. No respiratory distress. MSK: Moving all extremities equally, no peripheral edema. Tenderness superior margin of the patella. No joint laxity. FROM L hip, does have Lspine tenderness from the event. Skin: Dry, intact. No cyanosis  Neuro: Awake, alert, GCS15. CN II-XII grossly intact. Grossly normal gait. Psychiatric/Behavioral: Appropriate mood and affect. A/P: This is a 33 y.o. female who presents to the ED for evaluation of knee pain. XR knee, hip, lspine. Reevaluate and dispo accordingly.     ED Course       Critical Care Time  Procedures

## 2023-09-06 NOTE — ED PROVIDER NOTES
History  Chief Complaint   Patient presents with   • Knee Injury     Pt reports slipping on dogs weewee pad, injuring left knee. Reports pain and swelling to left knee, inability to ambulate. 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 Patient states that she was walking, slipped on her right foot when stopping on a repeat pad, tried to catch herself on her left leg and ended up twisting and putting weight on it in a weird way. Did not actually fall. Did not hit head/neck/back. Has been able to bear weight but difficult to walk. Having swelling and pain over her left knee and pain on left back. Not radiating all the way down to her foot. No numbness or tingling. No foot drop. Prior to Admission Medications   Prescriptions Last Dose Informant Patient Reported? Taking?    ALPRAZolam (XANAX PO)  Self Yes No   Sig: Take by mouth as needed   Clenpiq 10-3.5-12 MG-GM -GM/160ML SOLN  Self Yes No   Sig: FOLLOW DIRECTIONS ON SHEET GIVEN   Patient not taking: Reported on 6/5/2022   Flovent  MCG/ACT inhaler  Self Yes No   Sig: TAKE 1 PUFF BY MOUTH TWICE A DAY   Sertraline HCl (ZOLOFT PO)  Self Yes No   Sig: Take 100 mg by mouth daily    Patient not taking: Reported on 5/15/2023   benzonatate (TESSALON) 200 MG capsule   No No   Sig: Take 1 capsule (200 mg total) by mouth 3 (three) times a day as needed for cough   Patient not taking: Reported on 5/15/2023   busPIRone (BUSPAR) 5 mg tablet  Self Yes No   Sig: Take 5 mg by mouth 3 (three) times a day   cyclobenzaprine (FEXMID) 7.5 MG tablet   No No   Sig: Take 1 tablet (7.5 mg total) by mouth daily at bedtime as needed for muscle spasms   Patient not taking: Reported on 5/15/2023   desloratadine (CLARINEX) 5 MG tablet   Yes No   Sig: TAKE 1 TABLET (5 MG) BY ORAL ROUTE ONCE DAILY AS NEEDED FOR RHINORRHEA   dicyclomine (BENTYL) 20 mg tablet  Self No No   Sig: TAKE 1 TABLET (20 MG TOTAL) BY MOUTH EVERY 6 (SIX) HOURS AS NEEDED (ABDOMINAL CRAMPING)   Patient not taking: Reported on 6/5/2022   docusate sodium (COLACE) 100 mg capsule  Self No No   Sig: Take 1 capsule (100 mg total) by mouth every 12 (twelve) hours   Patient not taking: Reported on 5/6/2022   ergocalciferol (VITAMIN D2) 50,000 units   Yes No   Sig: TAKE 1 CAPSULE BY MOUTH ONE TIME PER WEEK   fluticasone (FLONASE) 50 mcg/act nasal spray   Yes No   Sig: SPRAY 1 - 2 SPRAYS (50 - 100 MCG) IN EACH NOSTRIL BY INTRANASAL ROUTE ONCE DAILY AS NEEDED   norethindrone-ethinyl estradiol (JUNEL FE 1/20) 1-20 MG-MCG per tablet   No No   Sig: Take one active pill po daily x 9 weeks then one week of inactive pills x one week.    ondansetron (Zofran ODT) 4 mg disintegrating tablet   No No   Sig: Take 1 tablet (4 mg total) by mouth every 6 (six) hours as needed for nausea or vomiting   Patient not taking: Reported on 6/5/2022   pantoprazole (PROTONIX) 40 mg tablet   No No   Sig: Take 1 tablet (40 mg total) by mouth daily   sertraline (ZOLOFT) 100 mg tablet  Self Yes No   Sig: TAKE ONE TABLET (100 MG) BY MOUTH DAILY      Facility-Administered Medications: None       Past Medical History:   Diagnosis Date   • Anemia    • Anxiety    • Asthma    • Depression    • Disease of thyroid gland    • GERD (gastroesophageal reflux disease)    • Hypothyroidism    • Varicella        Past Surgical History:   Procedure Laterality Date   • COLONOSCOPY  2015   • EGD  04/06/2021   • THYROIDECTOMY, PARTIAL  2017   • UPPER GASTROINTESTINAL ENDOSCOPY     • US GUIDED THYROID BIOPSY  3/23/2016       Family History   Problem Relation Age of Onset   • Hypertension Father    • Arthritis Father    • Anxiety disorder Father    • Hyperlipidemia Father    • Diabetes Maternal Grandfather    • Breast cancer Paternal Grandmother    • Cancer Paternal Grandmother         thyroid   • Thyroid disease Paternal Grandmother    • Anxiety disorder Mother    • Diabetes Mother    • Depression Mother    • Fibroids Mother    • Infertility Mother    • Asthma Mother    • Breast cancer Mother    • Sickle cell trait Brother    • Heart murmur Brother    • Heart murmur Son    • Autism Brother    • Bleeding Disorder Brother    • Seizures Brother      I have reviewed and agree with the history as documented. E-Cigarette/Vaping   • E-Cigarette Use Never User      E-Cigarette/Vaping Substances   • Nicotine No    • THC Yes    • CBD No    • Flavoring No    • Other No    • Unknown No      Social History     Tobacco Use   • Smoking status: Some Days     Packs/day: 0.50     Types: Cigarettes   • Smokeless tobacco: Never   • Tobacco comments:     socially   Vaping Use   • Vaping Use: Never used   Substance Use Topics   • Alcohol use: Yes     Alcohol/week: 9.0 standard drinks of alcohol     Types: 2 Glasses of wine, 4 Shots of liquor, 3 Standard drinks or equivalent per week     Comment: 1 drink daily   • Drug use: No        Review of Systems   Constitutional: Negative for activity change and fever. Musculoskeletal: Positive for arthralgias, back pain, gait problem and joint swelling. Skin: Negative for wound. Allergic/Immunologic: Negative for immunocompromised state. Neurological: Negative for syncope, weakness and numbness. Psychiatric/Behavioral: Negative for self-injury. All other systems reviewed and are negative.       Physical Exam  ED Triage Vitals   Temperature Pulse Respirations Blood Pressure SpO2   09/05/23 2158 09/05/23 2158 09/05/23 2158 09/05/23 2158 09/05/23 2158   98.5 °F (36.9 °C) 88 18 120/78 100 %      Temp Source Heart Rate Source Patient Position - Orthostatic VS BP Location FiO2 (%)   09/05/23 2158 09/05/23 2158 09/05/23 2158 09/05/23 2158 --   Oral Monitor Sitting Left arm       Pain Score       09/06/23 0016       8             Orthostatic Vital Signs  Vitals:    09/05/23 2158   BP: 120/78   Pulse: 88   Patient Position - Orthostatic VS: Sitting       Physical Exam  Vitals and nursing note reviewed. Constitutional:       Appearance: She is not ill-appearing, toxic-appearing or diaphoretic. HENT:      Head: Normocephalic and atraumatic. Right Ear: External ear normal.      Left Ear: External ear normal.      Nose: Nose normal.   Eyes:      General: No scleral icterus. Right eye: No discharge. Left eye: No discharge. Pupils: Pupils are equal, round, and reactive to light. Cardiovascular:      Rate and Rhythm: Normal rate. Pulses: Normal pulses. Pulmonary:      Effort: Pulmonary effort is normal. No respiratory distress. Breath sounds: No stridor. Abdominal:      General: Abdomen is flat. There is no distension. Hernia: No hernia is present. Musculoskeletal:         General: Normal range of motion. Cervical back: Normal range of motion. Comments: Swelling over left knee especially superior aspect. No ligamentous laxity. Able to flex and extend knee with pain but no decrease in ROM. Some tenderness in left lower back. Able to ROM hip without issue. Good distal pulse/motor/sensation. Skin:     General: Skin is warm and dry. Neurological:      General: No focal deficit present. Mental Status: She is alert. Mental status is at baseline. Psychiatric:         Mood and Affect: Mood normal.         Behavior: Behavior normal.         ED Medications  Medications   acetaminophen (TYLENOL) tablet 975 mg (975 mg Oral Given 9/6/23 0016)       Diagnostic Studies  Results Reviewed     None                 XR lumbar spine 2 or 3 views   Final Result by Lakshmi Chua MD (09/06 2561)      No acute lumbar spinal abnormalities         Workstation performed: [id number]         XR hip/pelv 2-3 vws left   Final Result by Lakshmi Chua MD (09/06 7067)      No acute osseous abnormality. Workstation performed: [id number]         XR knee 4+ views LEFT   ED Interpretation by Hayder Kincaid MD (09/05 2320)   X-ray independently interpreted by me. No fracture or dislocation. No Foreign bodies. Final Result by Lesly Richard MD (09/06 1014)      No acute osseous abnormality. Workstation performed: [id number]               Procedures  Procedures      ED Course  ED Course as of 09/06/23 2154   Tue Sep 05, 2023   2320 XR knee 4+ views LEFT  X-ray independently interpreted by me. No fracture or dislocation. No Foreign bodies. Wed Sep 06, 2023   0150 XR lumbar spine 2 or 3 views  X-ray independently interpreted by me. No fracture or dislocation. No Foreign bodies. 0150 XR hip/pelv 2-3 vws left  X-ray independently interpreted by me. No fracture or dislocation. No Foreign bodies. Medical Decision Making  No acute fracture or dislocation on evaluation. Patient able to ambulate with knee immobilizer applied to patient's left knee. Did not require crutches. Patient stable for discharge. Patient given [facility]. Given instructions for OTC pain medications. Given work note    Acute left-sided low back pain without sciatica: acute illness or injury  Injury of left knee, initial encounter: acute illness or injury  Amount and/or Complexity of Data Reviewed  Radiology: ordered and independent interpretation performed. Decision-making details documented in ED Course. Risk  OTC drugs.             Disposition  Final diagnoses:   Injury of left knee, initial encounter   Acute left-sided low back pain without sciatica     Time reflects when diagnosis was documented in both MDM as applicable and the Disposition within this note     Time User Action Codes Description Comment    9/6/2023  1:52 AM Hayder Kincaid Add [M26.51NV] Injury of left knee, initial encounter     9/6/2023  1:52 AM Hayder Kincaid Add [M54.50] Acute left-sided low back pain without sciatica       ED Disposition     ED Disposition   Discharge    Condition   Stable    Date/Time   Wed Sep 6, 2023  1:52 AM    Comment   Shahnaz Waller discharge to home/self care.                Follow-up Information    None         Discharge Medication List as of 9/6/2023  1:54 AM      CONTINUE these medications which have NOT CHANGED    Details   ALPRAZolam (XANAX PO) Take by mouth as needed, Historical Med      benzonatate (TESSALON) 200 MG capsule Take 1 capsule (200 mg total) by mouth 3 (three) times a day as needed for cough, Starting Sat 12/31/2022, Normal      busPIRone (BUSPAR) 5 mg tablet Take 5 mg by mouth 3 (three) times a day, Historical Med      Clenpiq 10-3.5-12 MG-GM -GM/160ML SOLN FOLLOW DIRECTIONS ON SHEET GIVEN, Historical Med      cyclobenzaprine (FEXMID) 7.5 MG tablet Take 1 tablet (7.5 mg total) by mouth daily at bedtime as needed for muscle spasms, Starting Thu 12/9/2021, Normal      desloratadine (CLARINEX) 5 MG tablet TAKE 1 TABLET (5 MG) BY ORAL ROUTE ONCE DAILY AS NEEDED FOR RHINORRHEA, Historical Med      dicyclomine (BENTYL) 20 mg tablet TAKE 1 TABLET (20 MG TOTAL) BY MOUTH EVERY 6 (SIX) HOURS AS NEEDED (ABDOMINAL CRAMPING), Starting Tue 5/4/2021, Normal      docusate sodium (COLACE) 100 mg capsule Take 1 capsule (100 mg total) by mouth every 12 (twelve) hours, Starting Fri 3/5/2021, Normal      ergocalciferol (VITAMIN D2) 50,000 units TAKE 1 CAPSULE BY MOUTH ONE TIME PER WEEK, Historical Med      Flovent  MCG/ACT inhaler TAKE 1 PUFF BY MOUTH TWICE A DAY, Historical Med      fluticasone (FLONASE) 50 mcg/act nasal spray SPRAY 1 - 2 SPRAYS (50 - 100 MCG) IN EACH NOSTRIL BY INTRANASAL ROUTE ONCE DAILY AS NEEDED, Historical Med      norethindrone-ethinyl estradiol (JUNEL FE 1/20) 1-20 MG-MCG per tablet Take one active pill po daily x 9 weeks then one week of inactive pills x one week., Normal      ondansetron (Zofran ODT) 4 mg disintegrating tablet Take 1 tablet (4 mg total) by mouth every 6 (six) hours as needed for nausea or vomiting, Starting Fri 5/6/2022, Normal      pantoprazole (PROTONIX) 40 mg tablet Take 1 tablet (40 mg total) by mouth daily, Starting Fri 3/5/2021, Until Mon 5/15/2023, Normal      !! sertraline (ZOLOFT) 100 mg tablet TAKE ONE TABLET (100 MG) BY MOUTH DAILY, Historical Med      !! Sertraline HCl (ZOLOFT PO) Take 100 mg by mouth daily , Historical Med       !! - Potential duplicate medications found. Please discuss with provider. PDMP Review       Value Time User    PDMP Reviewed  Yes 8/27/2022 11:34 PM Deborah Mcclendon MD           ED Provider  Attending physically available and evaluated Daphne Garza. I managed the patient along with the ED Attending.     Electronically Signed by         Lily Figueroa MD  09/06/23 8283

## 2023-09-07 ENCOUNTER — TELEPHONE (OUTPATIENT)
Dept: PHYSICAL THERAPY | Facility: OTHER | Age: 33
End: 2023-09-07

## 2023-09-07 NOTE — TELEPHONE ENCOUNTER
Call placed to the patient per [facility] referral.    Spoke with the patient she would like to wait on triage and call us back. Will assist patient if she calls back.      [facility] ref closed

## 2024-05-20 ENCOUNTER — ANNUAL EXAM (OUTPATIENT)
Dept: OBGYN CLINIC | Facility: CLINIC | Age: 34
End: 2024-05-20
Payer: COMMERCIAL

## 2024-05-20 VITALS
DIASTOLIC BLOOD PRESSURE: 90 MMHG | BODY MASS INDEX: 34.26 KG/M2 | HEIGHT: 65 IN | SYSTOLIC BLOOD PRESSURE: 124 MMHG | WEIGHT: 205.6 LBS

## 2024-05-20 DIAGNOSIS — Z30.41 ENCOUNTER FOR SURVEILLANCE OF CONTRACEPTIVE PILLS: ICD-10-CM

## 2024-05-20 DIAGNOSIS — Z01.419 ENCNTR FOR GYN EXAM (GENERAL) (ROUTINE) W/O ABN FINDINGS: Primary | ICD-10-CM

## 2024-05-20 DIAGNOSIS — D22.9 NEVUS: ICD-10-CM

## 2024-05-20 PROCEDURE — S0612 ANNUAL GYNECOLOGICAL EXAMINA: HCPCS | Performed by: NURSE PRACTITIONER

## 2024-05-20 RX ORDER — NORETHINDRONE ACETATE AND ETHINYL ESTRADIOL 1MG-20(21)
KIT ORAL
Qty: 84 TABLET | Refills: 3 | Status: SHIPPED | OUTPATIENT
Start: 2024-05-20

## 2024-05-20 RX ORDER — TIRZEPATIDE 2.5 MG/.5ML
INJECTION, SOLUTION SUBCUTANEOUS
COMMUNITY

## 2024-05-20 NOTE — PATIENT INSTRUCTIONS
Treatment for menstrual cramps/bleeding- Ibuprofen 600 mg by mouth with onset of bleeding or cramping, whichever is first. Take second dose of ibuprofen  400 mg by mouth with food and repeat every 6 hours x 3 days.  Pap with high risk HPV testing every 5 years, if normal. Due 2028  Sexually transmitted infection testing as indicated.Decliined.   Exercise most days of week-minimum of 150-300 minutes per week.   Obtain appropriate diet and hydration.   Calcium 1000 mg (in divided doses-max 600 mg at one time) + 600 vit D daily.  Birth control refilled as requested and sent to pharmacy on file. Take as directed (ACHES reviewed). Benefits, risks and alternatives discussed/reviewed.   HPV 9 vaccine series completed.   Annual mammogram starting at age 40, monthly breast self exam recommended.   Kegels 20 times twice daily.   Call your insurance company to verify coverage prior to completing any ordered tests.   Referred to dermatology.   Return to office in one year or sooner, if needed.

## 2024-05-20 NOTE — PROGRESS NOTES
Assessment/Plan:  Treatment for menstrual cramps/bleeding- Ibuprofen 600 mg by mouth with onset of bleeding or cramping, whichever is first. Take second dose of ibuprofen  400 mg by mouth with food and repeat every 6 hours x 3 days.  Pap with high risk HPV testing every 5 years, if normal. Due   Sexually transmitted infection testing as indicated.Decliined.   Exercise most days of week-minimum of 150-300 minutes per week.   Obtain appropriate diet and hydration.   Calcium 1000 mg (in divided doses-max 600 mg at one time) + 600 vit D daily.  Birth control refilled as requested and sent to pharmacy on file. Take as directed (ACHES reviewed). Benefits, risks and alternatives discussed/reviewed.   HPV 9 vaccine series completed.   Annual mammogram starting at age 40, monthly breast self exam recommended.   Kegels 20 times twice daily.   Call your insurance company to verify coverage prior to completing any ordered tests.   Referred to dermatology.   Return to office in one year or sooner, if needed.        1. Encntr for gyn exam (general) (routine) w/o abn findings  2. Encounter for surveillance of contraceptive pills  -     norethindrone-ethinyl estradiol (JUNEL FE 1/20) 1-20 MG-MCG per tablet; Take one active pill po daily x 9 weeks then one week of inactive pills x one week.  3. Nevus  -     Ambulatory Referral to Dermatology; Future             Subjective:      Patient ID: Celi Waller is a 33 y.o. female.    HPI    Celi Waller is a 33 y.o.  ( 11 yo boy ) female who is here today for her annual visit. No gynecologic health concerns. /90. Asymptomatic.   Hx of benign thyroid tumor. Partial thyroidectomy.   Extended cycles on her BIANKA. Bleeds Q 3 months x 7 days with mod to heavy flow. Menses is not acceptable due to lower pelvic cramping that starts menses day one x 3 days. Tylenol taken and is slightly effective.   Exercise- not regularly.   Quit smoking since her last visit.   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 Monogamous and feels safe in this relationship. Denies vaginal pain,bleeding or dryness.    She uses BIANKA  for contraception.    She is not interested in STD screening today.   She denies vaginal discharge, itching or pelvic pain.   She has no urinary concerns, does not have incontinence.  Admits to constipation. Admits to breast itching. Does have urticaria.     Last pap:   11/19/19 normal   05/15/2023 normal with negative HR HPV   DEXA scan: Not on file  Mammogram: Not on file   Colonoscopy: Not on file  HPV vaccine series:completed    Family history of cancer:   Cancer-related family history includes Breast cancer in her mother and paternal grandmother; Cancer in her paternal grandmother.      The following portions of the patient's history were reviewed and updated as appropriate: allergies, current medications, past family history, past medical history, past social history, past surgical history, and problem list.    Review of Systems   Constitutional: Negative.  Negative for activity change, appetite change, chills, diaphoresis, fatigue, fever and unexpected weight change.   HENT:  Negative for congestion, dental problem, sneezing, sore throat and trouble swallowing.    Eyes:  Negative for visual disturbance.   Respiratory:  Negative for chest tightness and shortness of breath.    Cardiovascular:  Negative for chest pain and leg swelling.   Gastrointestinal:  Negative for abdominal pain, constipation, diarrhea, nausea and vomiting.   Genitourinary:  Positive for menstrual problem. Negative for difficulty urinating, dyspareunia, dysuria, frequency, hematuria, pelvic pain, urgency, vaginal bleeding, vaginal discharge and vaginal pain.   Musculoskeletal:  Negative for back pain and neck pain.   Skin: Negative.    Allergic/Immunologic: Negative.    Neurological:  Negative for weakness and headaches.   Hematological:   "Negative for adenopathy.   Psychiatric/Behavioral: Negative.           Objective:      /90 (BP Location: Left arm, Patient Position: Sitting, Cuff Size: Standard)   Ht 5' 4.75\" (1.645 m)   Wt 93.3 kg (205 lb 9.6 oz)   LMP 05/19/2024   Breastfeeding No   BMI 34.48 kg/m²          Physical Exam  Vitals and nursing note reviewed.   Constitutional:       Appearance: Normal appearance. She is well-developed.   HENT:      Head: Normocephalic and atraumatic.   Eyes:      General:         Right eye: No discharge.         Left eye: No discharge.   Neck:      Thyroid: No thyromegaly.      Trachea: Trachea normal.   Cardiovascular:      Rate and Rhythm: Normal rate and regular rhythm.      Heart sounds: Normal heart sounds.   Pulmonary:      Effort: Pulmonary effort is normal.      Breath sounds: Normal breath sounds.   Chest:   Breasts:     Breasts are symmetrical.      Right: Normal. No inverted nipple, mass, nipple discharge, skin change or tenderness.      Left: Normal. No inverted nipple, mass, nipple discharge, skin change or tenderness.          Comments: 4 mm round raided black nevus on right lateral breast  Abdominal:      Palpations: Abdomen is soft.   Genitourinary:     General: Normal vulva.      Exam position: Lithotomy position.      Labia:         Right: No rash, tenderness, lesion or injury.         Left: No rash, tenderness, lesion or injury.       Urethra: No prolapse, urethral pain, urethral swelling or urethral lesion.      Vagina: Normal. No signs of injury and foreign body. No vaginal discharge, erythema, tenderness or bleeding.      Cervix: Normal.      Uterus: Normal.       Adnexa:         Right: No mass, tenderness or fullness.          Left: No mass, tenderness or fullness.        Rectum: No external hemorrhoid.   Musculoskeletal:         General: Normal range of motion.      Cervical back: Normal range of motion and neck supple.   Lymphadenopathy:      Head:      Right side of head: No " submental, submandibular or tonsillar adenopathy.      Left side of head: No submental, submandibular or tonsillar adenopathy.      Cervical: No cervical adenopathy.      Upper Body:      Right upper body: No supraclavicular or axillary adenopathy.      Left upper body: No supraclavicular or axillary adenopathy.      Lower Body: No right inguinal adenopathy. No left inguinal adenopathy.   Skin:     General: Skin is warm and dry.   Neurological:      Mental Status: She is alert and oriented to person, place, and time.   Psychiatric:         Mood and Affect: Mood normal.         Behavior: Behavior normal.

## 2025-01-21 NOTE — PROGRESS NOTES
Refill Decision Note   Kristie Dobbins  is requesting a refill authorization.  Brief Assessment and Rationale for Refill:  Approve     Medication Therapy Plan:         Comments:     Note composed:4:35 AM 01/21/2025             [facility] - Outpatient Follow-up Note  Nory Waller 35 y o  female MRN: [id number]  Encounter: [id number]          ASSESSMENT AND PLAN:      1  Nausea  2  Pain of upper abdomen  3  Irritable bowel syndrome with constipation     Her GI symptoms are secondary to irritable bowel syndrome with constipation and dyspepsia  She will continue taking Protonix 40 mg daily  I also recommend high-fiber diet and MiraLax to improve her bowel movement  Zofran as needed for nausea  Right upper quadrant sonogram to rule out gallstones and gastric emptying study to rule out gastroparesis  Bentyl as needed for crampy abdominal pain    Follow-up in 6 months    ______________________________________________________________________    SUBJECTIVE:   35 year female here for follow-up visit for multiple GI complaints  She has chronic nausea, constipation and abdominal bloating  She had  Extensive workup including EGD and colonoscopy which were unremarkable except gastritis  Random biopsy from the colon was also unremarkable  She reports frequent nausea but no vomiting  She also reports intermittent  Upper and lower abdominal pain  She has chronic constipation currently having bowel movement 2 to 3 times a day  MiraLax was recommended but she did not start yet  She has intermittent blood in the stool secondary to hemorrhoids      REVIEW OF SYSTEMS IS OTHERWISE NEGATIVE        Historical Information   Past Medical History:   Diagnosis Date    Anemia     Anxiety     Asthma     Depression     Disease of thyroid gland     GERD (gastroesophageal reflux disease)     Varicella      Past Surgical History:   Procedure Laterality Date    COLONOSCOPY  2015    EGD  04/06/2021    THYROIDECTOMY, PARTIAL  2017    UPPER GASTROINTESTINAL ENDOSCOPY       Social History   Social History     Substance and Sexual Activity   Alcohol Use Yes    Comment: socially      Social History     Substance and Sexual Activity   Drug Use No     Social History     Tobacco Use   Smoking Status Never Smoker   Smokeless Tobacco Never Used     Family History   Problem Relation Age of Onset    Hypertension Father     Arthritis Father     Anxiety disorder Father     Hyperlipidemia Father     Diabetes Maternal Grandfather     Breast cancer Paternal Grandmother     Cancer Paternal Grandmother         thyroid    Anxiety disorder Mother     Diabetes Mother     Depression Mother     Fibroids Mother     Infertility Mother     Sickle cell trait Brother     Heart murmur Brother     Heart murmur Son     Autism Brother     Bleeding Disorder Brother     Seizures Brother        Meds/Allergies       Current Outpatient Medications:     ALPRAZolam (XANAX PO)    busPIRone (BUSPAR) 5 mg tablet    Clenpiq 10-3 5-12 MG-GM -GM/160ML SOLN    dicyclomine (BENTYL) 20 mg tablet    docusate sodium (COLACE) 100 mg capsule    Flovent  MCG/ACT inhaler    Junel 1/20 1-20 MG-MCG per tablet    norethindrone-ethinyl estradiol (JUNEL FE 1/20) 1-20 MG-MCG per tablet    ondansetron (ZOFRAN-ODT) 4 mg disintegrating tablet    sertraline (ZOLOFT) 100 mg tablet    Sertraline HCl (ZOLOFT PO)    pantoprazole (PROTONIX) 40 mg tablet    Allergies   Allergen Reactions    Amoxicillin-Pot Clavulanate Vomiting and GI Intolerance    Bee Pollen Itching    Pollen Extract Itching           Objective     Blood pressure 110/70, pulse 73, temperature 98 8 °F (37 1 °C), temperature source Tympanic, height 5' 4" (1 626 m), weight 94 6 kg (208 lb 9 6 oz), unknown if currently breastfeeding  Body mass index is 35 81 kg/m²        PHYSICAL EXAM:      General Appearance:   Alert, cooperative, no distress   HEENT:   Normocephalic, atraumatic, anicteric      Neck:  Supple, symmetrical, trachea midline   Lungs:   Clear to auscultation bilaterally; no rales, rhonchi or wheezing; respirations unlabored    Heart[de-identified]   Regular rate and rhythm; no murmur, rub, or gallop  Abdomen:   Soft, non-tender, non-distended; normal bowel sounds; no masses, no organomegaly    Genitalia:   Deferred    Rectal:   Deferred    Extremities:  No cyanosis, clubbing or edema    Pulses:  2+ and symmetric    Skin:  No jaundice, rashes, or lesions    Lymph nodes:  No palpable cervical lymphadenopathy        Lab Results:   No visits with results within 1 Day(s) from this visit  Latest known visit with results is:   Orders Only on 04/21/2021   Component Date Value    Interpretation 04/21/2021      TISSUE TRANSGLUTAMINASE * 04/21/2021 1     IgA 04/21/2021 198          Radiology Results:   CT ABDOMEN AND PELVIS WITH IV CONTRAST     INDICATION:   lower abdominal pain, bloody stool      COMPARISON:  7/20/2018     TECHNIQUE:  CT examination of the abdomen and pelvis was performed  Axial, sagittal, and coronal 2D reformatted images were created from the source data and submitted for interpretation      Radiation dose length product (DLP) for this visit:  578 mGy-cm   This examination, like all CT scans performed in the [facility], was performed utilizing techniques to minimize radiation dose exposure, including the use of iterative   reconstruction and automated exposure control      IV Contrast:  100 mL of iohexol (OMNIPAQUE)  Enteric Contrast:  Enteric contrast was not administered      FINDINGS:     ABDOMEN     LOWER CHEST:  No clinically significant abnormality identified in the visualized lower chest      LIVER/BILIARY TREE:  Unremarkable      GALLBLADDER:  No calcified gallstones  No pericholecystic inflammatory change      SPLEEN:  Unremarkable      PANCREAS:  Unremarkable      ADRENAL GLANDS:  Unremarkable      KIDNEYS/URETERS:  Unremarkable  No hydronephrosis      STOMACH AND BOWEL:  Unremarkable      APPENDIX:  No findings to suggest appendicitis      ABDOMINOPELVIC CAVITY:  No ascites  No pneumoperitoneum    No lymphadenopathy      VESSELS:  Unremarkable for patient's age      PELVIS     REPRODUCTIVE ORGANS:  Unremarkable for patient's age      URINARY BLADDER:  Unremarkable      ABDOMINAL WALL/INGUINAL REGIONS:  Unremarkable      OSSEOUS STRUCTURES:  No acute fracture or destructive osseous lesion      IMPRESSION:     No acute inflammatory process detected in the abdomen or pelvis        Answers for HPI/ROS submitted by the patient on 6/23/2021  Chronicity: recurrent  Onset: more than 1 year ago  Onset quality: gradual  Frequency: every several days  Progression since onset: unchanged  Pain location: epigastric region, suprapubic region  Pain - numeric: 5/10  Pain quality: aching, cramping  Radiates to: chest  anorexia: No  arthralgias: Yes  belching: No  constipation: Yes  diarrhea: Yes  dysuria: No  fever: No  flatus: No  frequency: No  headaches: Yes  hematochezia: Yes  hematuria: No  melena: No  myalgias: Yes  nausea: Yes  weight loss: No  vomiting: No  Aggravated by: being still, certain positions, drinking alcohol  Relieved by: activity, liquids, recumbency  Diagnostic workup: CT scan, GI consult, lower endoscopy, upper endoscopy

## 2025-03-17 ENCOUNTER — HOSPITAL ENCOUNTER (EMERGENCY)
Facility: HOSPITAL | Age: 35
Discharge: HOME | End: 2025-03-17
Attending: EMERGENCY MEDICINE
Payer: COMMERCIAL

## 2025-03-17 ENCOUNTER — APPOINTMENT (EMERGENCY)
Dept: CT IMAGING | Facility: HOSPITAL | Age: 35
End: 2025-03-17
Payer: COMMERCIAL

## 2025-03-17 VITALS
TEMPERATURE: 98.5 F | WEIGHT: 184 LBS | BODY MASS INDEX: 30.86 KG/M2 | DIASTOLIC BLOOD PRESSURE: 83 MMHG | RESPIRATION RATE: 16 BRPM | HEART RATE: 83 BPM | OXYGEN SATURATION: 100 % | SYSTOLIC BLOOD PRESSURE: 135 MMHG

## 2025-03-17 DIAGNOSIS — K57.90 DIVERTICULOSIS: ICD-10-CM

## 2025-03-17 DIAGNOSIS — D21.9 FIBROIDS: ICD-10-CM

## 2025-03-17 DIAGNOSIS — R11.2 NAUSEA & VOMITING: Primary | ICD-10-CM

## 2025-03-17 DIAGNOSIS — K38.9 APPENDICOLITH: ICD-10-CM

## 2025-03-17 LAB
ALBUMIN SERPL BCG-MCNC: 4.3 G/DL (ref 3.5–5)
ALP SERPL-CCNC: 66 U/L (ref 34–104)
ALT SERPL W P-5'-P-CCNC: 11 U/L (ref 7–52)
ANION GAP SERPL CALCULATED.3IONS-SCNC: 9 MMOL/L (ref 4–13)
AST SERPL W P-5'-P-CCNC: 12 U/L (ref 13–39)
BACTERIA UR QL AUTO: NORMAL /HPF
BASOPHILS # BLD AUTO: 0.07 THOUSANDS/ÂΜL (ref 0–0.1)
BASOPHILS NFR BLD AUTO: 1 % (ref 0–1)
BILIRUB SERPL-MCNC: 0.56 MG/DL (ref 0.2–1)
BILIRUB UR QL STRIP: NEGATIVE
BUN SERPL-MCNC: 16 MG/DL (ref 5–25)
CALCIUM SERPL-MCNC: 9.4 MG/DL (ref 8.4–10.2)
CHLORIDE SERPL-SCNC: 105 MMOL/L (ref 96–108)
CLARITY UR: CLEAR
CO2 SERPL-SCNC: 22 MMOL/L (ref 21–32)
COLOR UR: YELLOW
CREAT SERPL-MCNC: 0.78 MG/DL (ref 0.6–1.3)
EOSINOPHIL # BLD AUTO: 0.35 THOUSAND/ÂΜL (ref 0–0.61)
EOSINOPHIL NFR BLD AUTO: 3 % (ref 0–6)
ERYTHROCYTE [DISTWIDTH] IN BLOOD BY AUTOMATED COUNT: 12.5 % (ref 11.6–15.1)
EXT PREGNANCY TEST URINE: NEGATIVE
EXT. CONTROL: NORMAL
GFR SERPL CREATININE-BSD FRML MDRD: 100 ML/MIN/1.73SQ M
GLUCOSE SERPL-MCNC: 76 MG/DL (ref 65–140)
GLUCOSE UR STRIP-MCNC: NEGATIVE MG/DL
HCT VFR BLD AUTO: 38.4 % (ref 34.8–46.1)
HGB BLD-MCNC: 13.6 G/DL (ref 11.5–15.4)
HGB UR QL STRIP.AUTO: NEGATIVE
IMM GRANULOCYTES # BLD AUTO: 0.03 THOUSAND/UL (ref 0–0.2)
IMM GRANULOCYTES NFR BLD AUTO: 0 % (ref 0–2)
KETONES UR STRIP-MCNC: ABNORMAL MG/DL
LEUKOCYTE ESTERASE UR QL STRIP: NEGATIVE
LIPASE SERPL-CCNC: 26 U/L (ref 11–82)
LYMPHOCYTES # BLD AUTO: 1.85 THOUSANDS/ÂΜL (ref 0.6–4.47)
LYMPHOCYTES NFR BLD AUTO: 17 % (ref 14–44)
MCH RBC QN AUTO: 31.5 PG (ref 26.8–34.3)
MCHC RBC AUTO-ENTMCNC: 35.4 G/DL (ref 31.4–37.4)
MCV RBC AUTO: 89 FL (ref 82–98)
MONOCYTES # BLD AUTO: 0.86 THOUSAND/ÂΜL (ref 0.17–1.22)
MONOCYTES NFR BLD AUTO: 8 % (ref 4–12)
NEUTROPHILS # BLD AUTO: 7.96 THOUSANDS/ÂΜL (ref 1.85–7.62)
NEUTS SEG NFR BLD AUTO: 71 % (ref 43–75)
NITRITE UR QL STRIP: NEGATIVE
NON-SQ EPI CELLS URNS QL MICRO: NORMAL /HPF
NRBC BLD AUTO-RTO: 0 /100 WBCS
PH UR STRIP.AUTO: 7 [PH]
PLATELET # BLD AUTO: 314 THOUSANDS/UL (ref 149–390)
PMV BLD AUTO: 9.2 FL (ref 8.9–12.7)
POTASSIUM SERPL-SCNC: 3.6 MMOL/L (ref 3.5–5.3)
PROT SERPL-MCNC: 7.7 G/DL (ref 6.4–8.4)
PROT UR STRIP-MCNC: ABNORMAL MG/DL
RBC # BLD AUTO: 4.32 MILLION/UL (ref 3.81–5.12)
RBC #/AREA URNS AUTO: NORMAL /HPF
SODIUM SERPL-SCNC: 136 MMOL/L (ref 135–147)
SP GR UR STRIP.AUTO: 1.02 (ref 1–1.03)
UROBILINOGEN UR STRIP-ACNC: 2 MG/DL
WBC # BLD AUTO: 11.12 THOUSAND/UL (ref 4.31–10.16)
WBC #/AREA URNS AUTO: NORMAL /HPF

## 2025-03-17 PROCEDURE — 99285 EMERGENCY DEPT VISIT HI MDM: CPT | Performed by: EMERGENCY MEDICINE

## 2025-03-17 PROCEDURE — 99284 EMERGENCY DEPT VISIT MOD MDM: CPT

## 2025-03-17 PROCEDURE — 96375 TX/PRO/DX INJ NEW DRUG ADDON: CPT

## 2025-03-17 PROCEDURE — 74177 CT ABD & PELVIS W/CONTRAST: CPT

## 2025-03-17 PROCEDURE — 96361 HYDRATE IV INFUSION ADD-ON: CPT

## 2025-03-17 PROCEDURE — 85025 COMPLETE CBC W/AUTO DIFF WBC: CPT

## 2025-03-17 PROCEDURE — 80053 COMPREHEN METABOLIC PANEL: CPT

## 2025-03-17 PROCEDURE — 83690 ASSAY OF LIPASE: CPT

## 2025-03-17 PROCEDURE — 36415 COLL VENOUS BLD VENIPUNCTURE: CPT

## 2025-03-17 PROCEDURE — 81001 URINALYSIS AUTO W/SCOPE: CPT | Performed by: STUDENT IN AN ORGANIZED HEALTH CARE EDUCATION/TRAINING PROGRAM

## 2025-03-17 PROCEDURE — 96374 THER/PROPH/DIAG INJ IV PUSH: CPT

## 2025-03-17 PROCEDURE — 81025 URINE PREGNANCY TEST: CPT | Performed by: STUDENT IN AN ORGANIZED HEALTH CARE EDUCATION/TRAINING PROGRAM

## 2025-03-17 RX ORDER — KETOROLAC TROMETHAMINE 30 MG/ML
15 INJECTION, SOLUTION INTRAMUSCULAR; INTRAVENOUS ONCE
Status: COMPLETED | OUTPATIENT
Start: 2025-03-17 | End: 2025-03-17

## 2025-03-17 RX ORDER — ONDANSETRON 4 MG/1
4 TABLET, ORALLY DISINTEGRATING ORAL EVERY 6 HOURS PRN
Qty: 20 TABLET | Refills: 0 | Status: SHIPPED | OUTPATIENT
Start: 2025-03-17

## 2025-03-17 RX ORDER — ONDANSETRON 2 MG/ML
4 INJECTION INTRAMUSCULAR; INTRAVENOUS ONCE
Status: COMPLETED | OUTPATIENT
Start: 2025-03-17 | End: 2025-03-17

## 2025-03-17 RX ADMIN — KETOROLAC TROMETHAMINE 15 MG: 30 INJECTION, SOLUTION INTRAMUSCULAR; INTRAVENOUS at 12:39

## 2025-03-17 RX ADMIN — ONDANSETRON 4 MG: 2 INJECTION INTRAMUSCULAR; INTRAVENOUS at 10:14

## 2025-03-17 RX ADMIN — SODIUM CHLORIDE 1000 ML: 0.9 INJECTION, SOLUTION INTRAVENOUS at 12:12

## 2025-03-17 RX ADMIN — IOHEXOL 85 ML: 350 INJECTION, SOLUTION INTRAVENOUS at 12:41

## 2025-03-17 NOTE — Clinical Note
Celi Waller was seen and treated in our emergency department on 3/17/2025.    No restrictions            Diagnosis:     Celi  may return to work on return date.    She may return on this date: 03/19/2025         If you have any questions or concerns, please don't hesitate to call.      Satnam Pink MD    ______________________________           _______________          _______________  Hospital Representative                              Date                                Time

## 2025-03-17 NOTE — DISCHARGE INSTRUCTIONS
Please follow-up with your endocrinologist regarding injection treatments and ED presentation.    Incidental findings on CT Abdomen/Pelvis noted below:   STOMACH AND BOWEL: Colonic diverticulosis without findings of acute diverticulitis. Ingested hyperdense contents throughout the colon and to a lesser extent small bowel.     APPENDIX: No findings to suggest appendicitis. Appendicolith noted.    REPRODUCTIVE ORGANS: Uterine fibroids

## 2025-03-17 NOTE — ED ATTENDING ATTESTATION
3/17/2025  I, Natalie Young MD, saw and evaluated the patient. I have discussed the patient with the resident/non-physician practitioner and agree with the resident's/non-physician practitioner's findings, Plan of Care, and MDM as documented in the resident's/non-physician practitioner's note, except where noted. All available labs and Radiology studies were reviewed.  I was present for key portions of any procedure(s) performed by the resident/non-physician practitioner and I was immediately available to provide assistance.       At this point I agree with the current assessment done in the Emergency Department.  I have conducted an independent evaluation of this patient a history and physical is as follows:    Celi is a 34-year-old female presents to the emergency department for evaluation with abdominal discomfort, nausea, vomiting and diarrhea.  Onset 2 days ago.  She has not been able to keep anything down and also has appreciated loose nonbloody stools.  She did restart Zepbound which she had lapse in use of for a couple of weeks.  She has had some nausea and decreased appetite with this in the past and accompanying occasional emesis though never anything to this frequency of emesis or accompanying diarrhea.  No specific known sick contacts or bad food ingestions.  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    No fever or URI symptoms.  No urinary symptoms.    On exam she appears malaised.  Mucous membranes moist.  Heart sounds regular lungs clear to auscultation bilaterally.  Abdomen is soft with normoactive bowel sounds.  Diffuse tenderness-maximal in the right upper and lower abdomen.  No CVA tenderness.    Differential diagnosis includes but is not limited to viral gastroenteritis, side effect from GLP-1 agonist, gastritis, colitis, diverticulitis.  With right-sided predominance additionally with concern for possible appendicitis.  Doubt UTI,  ureterolithiasis or GYN pathology.    ED Course         Critical Care Time  Procedures

## 2025-04-15 NOTE — ED PROVIDER NOTES
Time reflects when diagnosis was documented in both MDM as applicable and the Disposition within this note       Time User Action Codes Description Comment    3/17/2025  2:27 PM Satnam Pink [R11.2] Nausea & vomiting     3/17/2025  2:28 PM Satnam Pink [D21.9] Fibroids     3/17/2025  2:29 PM Satnam Pink [K57.90] Diverticulosis     3/17/2025  2:29 PM Satnam Pink [K38.9] Appendicolith           ED Disposition       ED Disposition   Discharge    Condition   Stable    Date/Time   Mon Mar 17, 2025  2:26 PM    Comment   Celi Waller discharge to home/self care.                   Assessment & Plan       Medical Decision Making  Patient presents with:  Vomiting: Pt reports vomiting since Saturday. Pt took zetbound injection on saturday  DDx includes medication side effect, DKA, appendicitis, gallbladder disease, UTI , obstructing calculus, pregnancy versus ectopic ACS, PE, or other unknown process  Workup per ED course: most consistent with medication side effect setting of restarting her Diabetic injection. Labs, imaging otherwise reassuring   Discharged with PCP follow-up , antiemetics for symptomatic managment  Dispo: Workup and return precautions reviewed. Patient expresses understanding, is comfortable with discharge, aggress to follow-up as advised and return to ED if symptoms recur.       Amount and/or Complexity of Data Reviewed  Labs: ordered. Decision-making details documented in ED Course.  Radiology: ordered. Decision-making details documented in ED Course.    Risk  Prescription drug management.        ED Course as of 04/15/25 0925   Mon Mar 17, 2025   1206 PREGNANCY TEST URINE: Negative   1207 WBC(!): 11.12   1207 POCT URINE PROTEIN(!): Trace   1207 Ketones, UA(!): 20 (1+)   1207 Urobilinogen, UA(!): 2.0   1208 3 days abdominal pain with associated nausea/vomiting after restarting injection treatment for diabetes.  Denies injection site  pain/swelling/discharge.  No fever/chills, redness of breath/chest pain . Smptoms significantly worse than when when initiating medication however notes has not taken in several weeks and newly increased dose.  LMP approximately 2 months ago.  Takes OCP.     Resting comfortably without acute distress  Diffuse abdominal tenderness with marked right lower quadrant without rebound  Cardiopulmonary exam reassuring    C/f medication side effect, DKA, appendicitis, gallbladder disease, UTI , obstructing calculus, pregnancy versus ectopic ACS, PE, or other unknown process    Plan blood work, UA, pregnancy, abdominal imaging, antiemetics/analgesics     1419 CT abdomen pelvis with contrast   1420 No acute abnormality in the abdomen or pelvis.    Incidental findings noted below:   STOMACH AND BOWEL: Colonic diverticulosis without findings of acute diverticulitis. Ingested hyperdense contents throughout the colon and to a lesser extent small bowel.     APPENDIX: No findings to suggest appendicitis. Appendicolith noted.    REPRODUCTIVE ORGANS: Uterine fibroids   1425 Patient notes marked symptomatic improvement, tolerating p.o..      Will plan discharge with Zofran  and work note       Medications   ondansetron (ZOFRAN) injection 4 mg (4 mg Intravenous Given 3/17/25 1014)   sodium chloride 0.9 % bolus 1,000 mL (0 mL Intravenous Stopped 3/17/25 1408)   ketorolac (TORADOL) injection 15 mg (15 mg Intravenous Given 3/17/25 1239)   iohexol (OMNIPAQUE) 350 MG/ML injection (MULTI-DOSE) 85 mL (85 mL Intravenous Given 3/17/25 1241)       ED Risk Strat Scores                    No data recorded                            History of Present Illness       Chief Complaint   Patient presents with    Vomiting     Pt reports vomiting since Saturday. Pt took zetbound injection on saturday       Past Medical History:   Diagnosis Date    Anemia     Anxiety     Asthma     Depression     Disease of thyroid gland     GERD (gastroesophageal reflux  disease)     Hypothyroidism     Varicella       Past Surgical History:   Procedure Laterality Date    COLONOSCOPY  2015    EGD  04/06/2021    THYROIDECTOMY, PARTIAL  2017    UPPER GASTROINTESTINAL ENDOSCOPY      US GUIDED THYROID BIOPSY  3/23/2016      Family History   Problem Relation Age of Onset    Anxiety disorder Mother     Diabetes Mother     Depression Mother     Fibroids Mother     Infertility Mother     Asthma Mother     Breast cancer Mother     Allergies Mother     Asthma Father     Hypertension Father     Arthritis Father     Anxiety disorder Father     Hyperlipidemia Father     Allergies Brother     Asthma Brother     Sickle cell trait Brother     Heart murmur Brother     Asthma Brother     Autism Brother     Bleeding Disorder Brother     Seizures Brother     Diabetes Maternal Grandfather     Breast cancer Paternal Grandmother     Cancer Paternal Grandmother         thyroid    Thyroid disease Paternal Grandmother     Heart murmur Son       Social History     Tobacco Use    Smoking status: Former     Current packs/day: 0.50     Types: Cigarettes    Smokeless tobacco: Never    Tobacco comments:     socially   Vaping Use    Vaping status: Former    Substances: THC   Substance Use Topics    Alcohol use: Yes     Alcohol/week: 9.0 standard drinks of alcohol     Types: 2 Glasses of wine, 4 Shots of liquor, 3 Standard drinks or equivalent per week     Comment: 1 drink daily    Drug use: Not Currently     Types: Marijuana     Comment: Medical card      E-Cigarette/Vaping    E-Cigarette Use Former User       E-Cigarette/Vaping Substances    Nicotine No     THC Yes     CBD No     Flavoring No     Other No     Unknown No       I have reviewed and agree with the history as documented.     Celi Waller is a 34 y.o. female p/w 3 days abdominal pain with associated nausea/vomiting after restarting injection treatment for diabetes.  Denies injection site pain/swelling/discharge.  No fever/chills, redness of breath/chest  pain . Smptoms significantly worse than when when initiating medication however notes has not taken in several weeks and newly increased dose.  LMP approximately 2 months ago.  Takes OCP.        All other systems reviewed and are negative      Vomiting      Review of Systems   Gastrointestinal:  Positive for vomiting.           Objective       ED Triage Vitals [03/17/25 1011]   Temperature Pulse Blood Pressure Respirations SpO2 Patient Position - Orthostatic VS   98.5 °F (36.9 °C) 83 135/83 16 100 % --      Temp src Heart Rate Source BP Location FiO2 (%) Pain Score    -- Monitor Left arm -- --      Vitals      Date and Time Temp Pulse SpO2 Resp BP Pain Score FACES Pain Rating User   03/17/25 1011 98.5 °F (36.9 °C) 83 100 % 16 135/83 -- -- LD            Physical Exam    General appearance: resting comfortably, no acute distress   HENT: Normocephalic, atraumatic, hearing grossly intact, and mucous membranes moist   Eyes: Conjunctiva normal, PERRL, EOM intact   Lungs/Chest: Respirations even and unlabored, breath sounds normal  Cardiovascular: Normal rate, regular rhythm  Abdomen: soft, non-distended, Diffuse abdominal tenderness with marked right lower quadrant without rebound  Musculoskeletal: extremities normal, atraumatic, no cyanosis or edema   Skin: warm and dry   Neurologic: Awake and alert, no apparent focal deficit  Psych: Mood consistent with affect     Results Reviewed       Procedure Component Value Units Date/Time    Urine Microscopic [[id number]]  (Normal) Collected: 03/17/25 1153    Lab Status: Final result Specimen: Urine, Clean Catch Updated: 03/17/25 1200     RBC, UA 1-2 /hpf      WBC, UA 1-2 /hpf      Epithelial Cells None Seen /hpf      Bacteria, UA Occasional /hpf     UA (URINE) with reflex to Scope [[id number]]  (Abnormal) Collected: 03/17/25 1153    Lab Status: Final result Specimen: Urine, Clean Catch Updated: 03/17/25 1200     Color, UA Yellow     Clarity, UA Clear     Specific Gravity, UA 1.019      pH, UA 7.0     Leukocytes, UA Negative     Nitrite, UA Negative     Protein, UA Trace mg/dl      Glucose, UA Negative mg/dl      Ketones, UA 20 (1+) mg/dl      Urobilinogen, UA 2.0 mg/dl      Bilirubin, UA Negative     Occult Blood, UA Negative    POCT pregnancy, urine [[id number]]  (Normal) Collected: 03/17/25 1151    Lab Status: Final result Updated: 03/17/25 1151     EXT Preg Test, Ur Negative     Control Valid    Lipase [[id number]]  (Normal) Collected: 03/17/25 1013    Lab Status: Final result Specimen: Blood from Arm, Right Updated: 03/17/25 1039     Lipase 26 u/L     Comprehensive metabolic panel [[id number]]  (Abnormal) Collected: 03/17/25 1013    Lab Status: Final result Specimen: Blood from Arm, Right Updated: 03/17/25 1039     Sodium 136 mmol/L      Potassium 3.6 mmol/L      Chloride 105 mmol/L      CO2 22 mmol/L      ANION GAP 9 mmol/L      BUN 16 mg/dL      Creatinine 0.78 mg/dL      Glucose 76 mg/dL      Calcium 9.4 mg/dL      AST 12 U/L      ALT 11 U/L      Alkaline Phosphatase 66 U/L      Total Protein 7.7 g/dL      Albumin 4.3 g/dL      Total Bilirubin 0.56 mg/dL      eGFR 100 ml/min/1.73sq m     Narrative:      [employer] Kid[employer] guidelines for Chronic Kidney Disease (CKD):     Stage 1 with normal or high GFR (GFR > 90 mL/min/1.73 square meters)    Stage 2 Mild CKD (GFR = 60-89 mL/min/1.73 square meters)    Stage 3A Moderate CKD (GFR = 45-59 mL/min/1.73 square meters)    Stage 3B Moderate CKD (GFR = 30-44 mL/min/1.73 square meters)    Stage 4 Severe CKD (GFR = 15-29 mL/min/1.73 square meters)    Stage 5 End Stage CKD (GFR <15 mL/min/1.73 square meters)  Note: GFR calculation is accurate only with a steady state creatinine    CBC and differential [[id number]]  (Abnormal) Collected: 03/17/25 1013    Lab Status: Final result Specimen: Blood from Arm, Right Updated: 03/17/25 1022     WBC 11.12 Thousand/uL      RBC 4.32 Million/uL      Hemoglobin 13.6 g/dL      Hematocrit 38.4 %      MCV  89 fL      MCH 31.5 pg      MCHC 35.4 g/dL      RDW 12.5 %      MPV 9.2 fL      Platelets 314 Thousands/uL      nRBC 0 /100 WBCs      Segmented % 71 %      Immature Grans % 0 %      Lymphocytes % 17 %      Monocytes % 8 %      Eosinophils Relative 3 %      Basophils Relative 1 %      Absolute Neutrophils 7.96 Thousands/µL      Absolute Immature Grans 0.03 Thousand/uL      Absolute Lymphocytes 1.85 Thousands/µL      Absolute Monocytes 0.86 Thousand/µL      Eosinophils Absolute 0.35 Thousand/µL      Basophils Absolute 0.07 Thousands/µL             CT abdomen pelvis with contrast   Final Interpretation by Mitul Crain MD (03/17 9407)      No acute abnormality in the abdomen or pelvis.         Workstation performed: [id number]             Procedures    ED Medication and Procedure Management   Prior to Admission Medications   Prescriptions Last Dose Informant Patient Reported? Taking?   ALPRAZolam (XANAX PO)  Self Yes No   Sig: Take by mouth as needed   Qvar RediHaler 80 MCG/ACT inhaler  Self No No   Sig: Inhale 2 puffs 2 (two) times a day as needed (cough) Rinse mouth after use.   Zepbound 2.5 MG/0.5ML auto-injector  Self Yes No   Sig: INJECT 2.5 MG SUBCUTANEOUSLY EVERY 7 DAYS   albuterol (PROVENTIL HFA,VENTOLIN HFA) 90 mcg/act inhaler  Self Yes No   Sig: Inhale 2 puffs every 4 (four) hours as needed for wheezing   busPIRone (BUSPAR) 5 mg tablet  Self Yes No   Sig: Take 5 mg by mouth 3 (three) times a day   ergocalciferol (VITAMIN D2) 50,000 units  Self Yes No   fluticasone (FLONASE) 50 mcg/act nasal spray  Self Yes No   montelukast (SINGULAIR) 10 mg tablet   No No   Sig: TAKE ONE TABLET ( 10MG) BY MOUTH DAILY STARTING 2 DAYS BEFORE AND THE DAY OF [facility] IMMUNOTHERAPY.   norethindrone-ethinyl estradiol (JUNEL FE 1/20) 1-20 MG-MCG per tablet   No No   Sig: Take one active pill po daily x 9 weeks then one week of inactive pills x one week.   ondansetron (Zofran ODT) 4 mg disintegrating tablet  Self No No   Sig: Take  1 tablet (4 mg total) by mouth every 6 (six) hours as needed for nausea or vomiting   sertraline (ZOLOFT) 100 mg tablet  Self Yes No   Sig: TAKE ONE TABLET (100 MG) BY MOUTH DAILY      Facility-Administered Medications: None     Discharge Medication List as of 3/17/2025  2:32 PM        CONTINUE these medications which have NOT CHANGED    Details   albuterol (PROVENTIL HFA,VENTOLIN HFA) 90 mcg/act inhaler Inhale 2 puffs every 4 (four) hours as needed for wheezing, Historical Med      ALPRAZolam (XANAX PO) Take by mouth as needed, Historical Med      busPIRone (BUSPAR) 5 mg tablet Take 5 mg by mouth 3 (three) times a day, Historical Med      ergocalciferol (VITAMIN D2) 50,000 units Historical Med      fluticasone (FLONASE) 50 mcg/act nasal spray Historical Med      montelukast (SINGULAIR) 10 mg tablet TAKE ONE TABLET ( 10MG) BY MOUTH DAILY STARTING 2 DAYS BEFORE AND THE DAY OF [facility] IMMUN[facility]APY., Normal      norethindrone-ethinyl estradiol (JUNEL FE 1/20) 1-20 MG-MCG per tablet Take one active pill po daily x 9 weeks then one week of inactive pills x one week., Normal      ondansetron (Zofran ODT) 4 mg disintegrating tablet Take 1 tablet (4 mg total) by mouth every 6 (six) hours as needed for nausea or vomiting, Starting Fri 5/6/2022, Normal      Qvar RediHaler 80 MCG/ACT inhaler Inhale 2 puffs 2 (two) times a day as needed (cough) Rinse mouth after use., Starting Thu 2/29/2024, Normal      sertraline (ZOLOFT) 100 mg tablet TAKE ONE TABLET (100 MG) BY MOUTH DAILY, Historical Med      Zepbound 2.5 MG/0.5ML auto-injector INJECT 2.5 MG SUBCUTANEOUSLY EVERY 7 DAYS, Historical Med           No discharge procedures on file.  ED SEPSIS DOCUMENTATION   Time reflects when diagnosis was documented in both MDM as applicable and the Disposition within this note       Time User Action Codes Description Comment    3/17/2025  2:27 PM Satnam Pink Add [R11.2] Nausea & vomiting     3/17/2025  2:28 PM Satnam Pink  Rai Ford [D21.9] Fibroids     3/17/2025  2:29 PM Satnam Pink [K57.90] Diverticulosis     3/17/2025  2:29 PM Satnam Pink [K38.9] Appendicolith                  Satnam Pink MD  04/15/25 0925

## 2025-05-01 ENCOUNTER — TELEMEDICINE (OUTPATIENT)
Dept: OTHER | Facility: HOSPITAL | Age: 35
End: 2025-05-01
Payer: COMMERCIAL

## 2025-05-01 DIAGNOSIS — J06.9 VIRAL URI WITH COUGH: Primary | ICD-10-CM

## 2025-05-01 DIAGNOSIS — J02.9 SORE THROAT: ICD-10-CM

## 2025-05-01 PROCEDURE — 99213 OFFICE O/P EST LOW 20 MIN: CPT | Performed by: PHYSICIAN ASSISTANT

## 2025-05-01 NOTE — PROGRESS NOTES
Virtual Regular Visit  Name: Celi Waller      : 1990      MRN: [id number]  Encounter Provider: Your Video Visit Provider  Encounter Date: 2025   Encounter department: VIRTUAL CARE       Verification of patient location:  Patient is located at Home in the following state in which I hold an active license [place] :  Assessment & Plan  Viral URI with cough         Sore throat    Orders:    Strep A PCR; Future        Encounter provider Your Video Visit Provider    The patient was identified by name and date of birth. Celi Waller was informed that this is a telemedicine visit and that the visit is being conducted through the Epic Embedded platform. She agrees to proceed..  My office door was closed. No one else was in the room. She acknowledged consent and understanding of privacy and security of the video platform. The patient has agreed to participate and understands they can discontinue the visit at any time.    Patient is aware this is a billable service.     History obtained from: patient  History of Present Illness     34 y.o. female presents with cough, congestion and sore throat onset yesterdays, denies fever, chills, N/V/D, SOB, CP, HA, blurry vision, dizziness or fainting. Notes is a , so + sick contacts.       Review of Systems   Constitutional:  Negative for chills and fever.   HENT:  Positive for congestion, sinus pressure and sore throat. Negative for ear pain.    Respiratory:  Positive for cough.    All other systems reviewed and are negative.      Objective   There were no vitals taken for this visit.    Physical Exam  Constitutional:       General: She is not in acute distress.     Appearance: Normal appearance. She is not ill-appearing or toxic-appearing.   HENT:      Head: Normocephalic and atraumatic.      Right Ear: External ear normal.      Left Ear: External ear normal.      Nose: Congestion present.   Eyes:      Extraocular Movements: Extraocular movements intact.       Conjunctiva/sclera: Conjunctivae normal.   Neurological:      Mental Status: She is alert and oriented to person, place, and time. Mental status is at baseline.   Psychiatric:         Mood and Affect: Mood normal.         Behavior: Behavior normal.         Thought Content: Thought content normal.         Judgment: Judgment normal.         Visit Time  Total Visit Duration: 7 minutes not including the time spent for establishing the audio/video connection.

## 2025-05-07 ENCOUNTER — CONSULT (OUTPATIENT)
Dept: DERMATOLOGY | Facility: CLINIC | Age: 35
End: 2025-05-07
Payer: COMMERCIAL

## 2025-05-07 VITALS — TEMPERATURE: 97.8 F

## 2025-05-07 DIAGNOSIS — L82.1 SEBORRHEIC KERATOSIS: ICD-10-CM

## 2025-05-07 DIAGNOSIS — L21.9 SEBORRHEIC DERMATITIS: Primary | ICD-10-CM

## 2025-05-07 DIAGNOSIS — D23.9 DERMATOFIBROMA: ICD-10-CM

## 2025-05-07 DIAGNOSIS — L81.3 CAFÃ© AU LAIT SPOT: ICD-10-CM

## 2025-05-07 PROCEDURE — 99204 OFFICE O/P NEW MOD 45 MIN: CPT | Performed by: DERMATOLOGY

## 2025-05-07 RX ORDER — KETOCONAZOLE 20 MG/ML
SHAMPOO, SUSPENSION TOPICAL
Qty: 100 ML | Refills: 10 | Status: SHIPPED | OUTPATIENT
Start: 2025-05-07

## 2025-05-07 NOTE — PATIENT INSTRUCTIONS
"ACNE VULGARIS (\"COMMON ACNE\")      Treatment plan:Based on a thorough discussion of this condition and the management approach to it (including a comprehensive discussion of the known risks, side effects and potential benefits of treatment), the patient (family) agrees to implement the following specific plan:    Topical medications:   Start a Benzoyl- Peroxide or Salicylic acid face wash daily. Can use Differin gel to help with ance scarring. Use a daily moisturizer like CeraVe or Cetaphil, for the morning use a moisturizer with SPF in it.        SEBORRHEIC DERMATITIS    Assessment and Plan:  History and physical exam most consistent with seborrheic dermatitis. The chronic nature of this condition and association with normal skin yeast were reviewed. Educated that the goal of therapy is to control symptoms, but this is not typically something we cure and intermittent flares can be expected. Based on a thorough discussion of this condition and the management approach to it (including a comprehensive discussion of the known risks, side effects and potential benefits of treatment), the patient (family) agrees to implement the following specific plan:           SCALP  - Start ketoconazole 2% shampoo 2x/week to damp scalp, let sit 5-10 minutes then rinse (may use normal shampoo/conditioner thereafter as desired)    DERMATOFIBROMA      Assessment and Plan:  Based on a thorough discussion of this condition and the management approach to it (including a comprehensive discussion of the known risks, side effects and potential benefits of treatment), the patient (family) agrees to implement the following specific plan:  Benign, reassurance provided to patient  No features concerning for malignancy on both clinical and dermatoscopic exam today     CAFE AU LAIT MACULE    Assessment and Plan:  Based on a thorough discussion of this condition and the management approach to it (including a comprehensive discussion of the known " risks, side effects and potential benefits of treatment), the patient (family) agrees to implement the following specific plan:  Reassured benign    What is a café-au-lait macule?  A café-au-lait macule is a common birthmark, presenting as a hyperpigmented skin patch with a sharp border and diameter of > 0.5 cm. It is also known as circumscribed café-au-lait hypermelanosis, von Recklinghausen spot, or abbreviated as 'CALM'.    Who gets café-au-lait macules?  Café-au-lait macules are usually present at birth (congenital) or appear in early infancy. They sometimes become apparent later in infancy, especially after exposure to the sun, which darkens the color.    They may be isolated or associated with systemic diseases such as neurofibromatosis (NF), Marina Gravity syndrome, Legius syndrome, and Cullen syndrome with multiple lentigines syndrome.    The overall prevalence of café-au-lait macules varies with race.  0.3% of Caucasians  0.4% of Chinese  3% of Hispanics  18% of  Americans  Isolated café-au-lait macules are invariably solitary. More than 3 in a  or more than 5 in an  are uncommon and should lead to systemic evaluation, referral and close follow-up.    What causes café-au-lait macules?  The brown color of a café-au-lait macule is due to a pigment called melanin, which is produced in the skin by cells called melanocytes.   The epidermal melanocytes of an isolated café-au-lait macule have excessive numbers of melanosomes (intracellular pigment granules). This is known as epidermal melanotic hypermelanosis.  The café-au-lait macules associated with NF type 1 and Leopard syndrome have increased proliferation of epidermal melanocytes (epidermal melanocytic hyperplasia).   A café-au-lait macules is not classified as a congenital melanocytic naevus.     Multiple café-au-lait macules are related to several genetic syndromes.  Neurofibromatosis type 1  About half of those with  neurofibromatosis type 1 (NF1) have an inherited mutation of the NF1 gene on chromosome 17. NF1 codes for neurofibromin, a tumor suppressor gene. Others have a sporadic mutation of the same gene.    Neurofibromatosis type 2  Like NF1, autosomal dominant and sporadic mutations of the NF2 gene are equally common. NF2 gene codes for Merlin protein, whose physiologic function is still under investigation.    Legius syndrome  Legius syndrome is caused by SPRED gene mutation, which generally controls the MICHAEL pathway and interacts with neurofibromin.    Marina Florida syndrome  Marina Dallas syndrome is caused by mutation of Gs protein, activating adenylate cyclase.    Marshallberg syndrome with multiple lentigines  Marshallberg syndrome with multiple lentigines is due to the autosomal dominant inheritance of mutated PTPN11 gene on chromosome 12. The gene codes protein tyrosine phosphatase SHP-2.    The next three syndromes are much rarer than those described above.  Corona syndrome  Corona syndrome is linked to mutation of NF1 gene, or is at least allelic to NF1, or is caused by mutation of contiguous genes to NF1.    Meza syndrome  Meza syndrome is due to the autosomal recessive mutation in BLM gene on chromosome 15. The gene product is DNA helicase, an enzyme essential to DNA repair to prevent chromosomal breakage.    Silver-Zack syndrome  There are several genetic abnormalities associated with Silver-Zack syndrome. The 2 most common are:  The absence of methylation in the genetic imprinting process of H19 and IGF2 genes on chromosome 11. They are responsible for normal cell growth. This abnormality is found in 30% of cases of Silver-Zack syndrome.  Inheritance of both chromosome 7s from mother (maternal uniparental disomy).    What are the clinical features of café-au-lait macules?  Café-au-lait macules:  Are light brown in color.  The pigment is evenly distributed.  They are well demarcated with a smooth or  irregular border   Their shape is either round or oval.    The distribution and configuration of café-au-lait macules can be a clue to an underlying syndrome.    NF1  NF1 is highly variable in appearance. The main 2 National [place] of [employer] (NIH) Consensus criteria for the diagnosis of NF1 are:  6 or more café-au-lait macules with diameter > 5 mm in children and > 15 mm in adults. They may be on the trunk or extremities.  Axillary or inguinal freckling. These are small café-au-lait macules and have the same microscopic appearance.  There are 5 other NIH criteria:  Cutaneous neurofibromas (> 2) or a plexiform neurofibroma (> 1).   Cutaneous neurofibromas are present in > 90% of adults with NF1. They are soft tumors that move with the skin, are not painful and do not have malignant potential.  Plexiform neurofibromas are found in 25% of NF1 patients. They are soft, painful, hyper pigmented plaques and sometimes have excessive hair (hypertrichosis). If large enough, they can cause distortion of surrounding structures. Plexiform neurofibromas have the potential for malignant transformation.  Lisch nodules on iris (> 2)  Optic pathway glioma  Osseous dysplasia: sphenoid dysplasia; thinning and bowing of long bone; pseudoarthrosis  First degree relatives diagnosed with NF 1 using these criteria    At least two criteria are required to make a working diagnosis of neurofibromatosis. The definitive diagnosis is made by confirming the presence of a genetic mutation.    NF type 2  NF2 presents with unilateral or bilateral acoustic schwannoma (vestibular schwannoma). Patients develop hearing problems, ringing in the ears (tinnitus), and dizziness. By the age of 30, nearly all patients with NF2 have bilateral vestibular schwannoma.  Other nervous system tumors in NF2 include cranial and peripheral nerve schwannomas, meningiomas, ependymomas, and astrocytomas.   60-80% of patients with NF2 suffer from presenile posterior  subcapsular lenticular opacities (cataracts).  The main skin lesion arising in NF2 is an elastic, firm, well-demarcated subcutaneous neurilemmoma. Café-au-lait macules are less common.    Legius syndrome  Patients with Legius syndrome have multiple café-au-lait macules (> 5mm in children and > 15 mm in adults). Axillary freckling less common  They may rarely have macrocephaly, cognitive disabilities, and several congenital malformations such as Cullen-like facies, pectus excavatum/carinatum, and lipomas.    Marina Ravensdale syndrome  Café-au-lait macules in Marina Ravensdale syndrome are fewer than in NF1, with more irregular borders. They are classically found on the midline.  The clinical diagnosis of Marina Florida syndrome is established by a triad of abnormalities:  Polyostotic or monostotic fibrous dysplasia  Café-au-lait macules  Hyperfunctioning hormonal disorders such as precocious puberty, hyperthyroidism, hypercortisolism, hyperomatotropism, and hypophosphataemic rickets.    Cullen syndrome with multiple lentigines  [place] syndrome with multiple lentigines is also known as LEOPARD syndrome; the L of LEOPARD syndrome refers to prominent lentigines. These are multiple < 5 mm, well-demarcated, brown macules presenting on the whole skin without mucous membrane involvement. They appear at birth and continue increasing in number until puberty.    LEOPARD is an acronym referring to the clinical findings required to make the diagnosis:  Lentigines  Electrocardiogram conduction defects  Ocular hypertelorism  Pulmonary stenosis  Abnormalities of genitalia  Retardation of growth  Sensorineural deafness  Patients with Kearny syndrome with multiple lentigines may also develop café-au-lait macules, nail malformation, and hyperelastic skin.    Corona syndrome  Corona syndrome is extremely rare with only 4 families described between the 1960s to early 1990s. Their café-au-lait macules in Corona syndrome had similar  characteristics to NF1. Other features of Corona syndrome are:  Stenosis of the pulmonary valve  Mental retardation  Short stature  Relative macrocephaly  Lisch nodules on the iris  Neurofibromas  Bloom syndrome  Café-au-lait macules are not the main clinical finding in Bloom syndrome. Key characteristics of Bloom syndrome are:  Growth deficiency  Immunodeficiency  Malignancies  Predilection to diabetes  Distinctive narrow facies  High-pitched voice  Hypogonadism and/or infertility    Silver-Zack syndrome  Even though café-au-lait macules are not essential for diagnosis, they are common in children with Silver-Zack syndrome. They are mainly located on chest, stomach, and extremities.  The main features of Silver-Zack syndrome are:  Severe retardation of intrauterine and  growth  Relative macrocephaly  Small, triangular facies and prominent forehead  Other congenital malformations, including clinodactyly V, hemihypoplasia, micrognathia, and ear anomalies    How is a café-au-lait macule diagnosed?  Café-au-lait macules are diagnosed clinically. If significant in number and size, a complete clinical examination should be undertaken to determine whether an associated syndrome may be present.  Syndromes may be diagnosed from their clinical manifestations or by genetic testing.    What is the treatment for café-au-lait macules?  No medical care is required to treat café-au-lait macules. Lasers reported to have successfully faded café-au-lait macules include:  Pulsed-dye laser  Er:YAG laser  Q-switched Nd:YAG laser  Q-switched elsy or alexandrite laser  Results are inconsistent. One group has found lesions with an irregular margin respond better than those with a smooth, well-defined border. Risks for laser surgery include transient/permanent hyperpigmentation, hypopigmentation, and scarring.  Treatment of underlying syndromes may be complex and require multidisciplinary care.    What is the outcome for  "café-au-lait macules?  Without treatment, café-au-lait macules persist lifelong. Results from lasers are not consistent. However, for those who responded to initial treatment, recurrence rates are reported to be low.    EDMOND ANGIOMAS     Assessment and Plan:  Based on a thorough discussion of this condition and the management approach to it (including a comprehensive discussion of the known risks, side effects and potential benefits of treatment), the patient (family) agrees to implement the following specific plan:  Reassure benign        SEBORRHEIC KERATOSIS; NON-INFLAMED     Assessment and Plan:  Based on a thorough discussion of this condition and the management approach to it (including a comprehensive discussion of the known risks, side effects and potential benefits of treatment), the patient (family) agrees to implement the following specific plan:  Reassure benign  Use sun protection.  Apply SPF 30 or higher at least three times a day.  Wear sun protecting clothing and hats.        SOLAR LENTIGINES   OTHER SKIN CHANGES DUE TO CHRONIC EXPOSURE TO NONIONIZING RADIATION     Assessment and Plan:  Based on a thorough discussion of this condition and the management approach to it (including a comprehensive discussion of the known risks, side effects and potential benefits of treatment), the patient (family) agrees to implement the following specific plan:  Reassure benign  Use sun protection.  Apply SPF 30 or higher at least three times a day.  Wear sun protecting clothing and hats.         MULTIPLE MELANOCYTIC NEVI (\"Moles\")     Assessment and Plan:  Based on a thorough discussion of this condition and the management approach to it (including a comprehensive discussion of the known risks, side effects and potential benefits of treatment), the patient (family) agrees to implement the following specific plan:  Reassure benign  Monitor for changes  Use sun protection.  Apply SPF 30 or higher at least three times " a day.  Wear sun protecting clothing and hats.       Worrisome signs of skin malignancy discussed, questions answered. Regular self-skin check discussed. Advised to call or return to office if patient notices any spots of concern, rapidly growing/changing lesions, bleeding lesions, non-healing lesions. Advised regular SPF use.

## 2025-05-07 NOTE — PROGRESS NOTES
"[facility] Note     Patient Name: Celi Waller  Encounter Date: 5/7/25       Have you been cared for by a [facility] Dermatologist in the last 3 years and, if so, which description applies to you? NO. I am considered a \"new\" patient and must complete all patient intake questions. I am of child-bearing potential.     REVIEW OF SYSTEMS:  Have you recently had or currently have any of the following? Recent fever or chills? No  Any non-healing wound? No  Are you pregnant or planning to become pregnant? No  Are you currently or planning to be nursing or breast feeding? No   PAST MEDICAL HISTORY:  Have you personally ever had or currently have any of the following?  If \"YES,\" then please provide more detail. Skin cancer (such as Melanoma, Basal Cell Carcinoma, Squamous Cell Carcinoma?  No  Tuberculosis, HIV/AIDS, Hepatitis B or C: No  Radiation Treatment No   HISTORY OF IMMUNOSUPPRESSION:   Do you have a history of any of the following:  Systemic Immunosuppression such as Diabetes, Biologic or Immunotherapy, Chemotherapy, Organ Transplantation, Bone Marrow Transplantation or Prednsione?  No    Answering \"YES\" requires the addition of the dotphrase \"IMMUNOSUPPRESSED\" as the first diagnosis of the patient's visit.   FAMILY HISTORY:  Any \"first degree relatives\" (parent, brother, sister, or child) with the following?    Skin Cancer, Pancreatic or Other Cancer? No   PATIENT EXPERIENCE:    Do you want the Dermatologist to perform a COMPLETE skin exam today including a clinical examination under the \"bra and underwear\" areas?  Yes  If necessary, do we have your permission to call and leave a detailed message on your Preferred Phone number that includes your specific medical information?  Yes      Allergies   Allergen Reactions    Amoxicillin-Pot Clavulanate GI Intolerance and Vomiting     Just Augmentin    Bee Pollen Itching    Pollen Extract Itching      Current Outpatient Medications:     ketoconazole " "(NIZORAL) 2 % shampoo, Use as shampoo at least 3 times per week to scalp. Lather into scalp and let sit for 5 minutes before rinsing., Disp: 100 mL, Rfl: 10    albuterol (PROVENTIL HFA,VENTOLIN HFA) 90 mcg/act inhaler, Inhale 2 puffs every 4 (four) hours as needed for wheezing, Disp: , Rfl:     ALPRAZolam (XANAX PO), Take by mouth as needed, Disp: , Rfl:     busPIRone (BUSPAR) 5 mg tablet, Take 5 mg by mouth 3 (three) times a day, Disp: , Rfl:     ergocalciferol (VITAMIN D2) 50,000 units, , Disp: , Rfl:     fluticasone (FLONASE) 50 mcg/act nasal spray, , Disp: , Rfl:     montelukast (SINGULAIR) 10 mg tablet, TAKE ONE TABLET ( 10MG) BY MOUTH DAILY STARTING 2 DAYS BEFORE AND THE DAY OF [facility] IMMUNOTHERAPY., Disp: 30 tablet, Rfl: 0    norethindrone-ethinyl estradiol (JUNEL FE 1/20) 1-20 MG-MCG per tablet, Take one active pill po daily x 9 weeks then one week of inactive pills x one week., Disp: 84 tablet, Rfl: 3    ondansetron (Zofran ODT) 4 mg disintegrating tablet, Take 1 tablet (4 mg total) by mouth every 6 (six) hours as needed for nausea or vomiting, Disp: 20 tablet, Rfl: 0    ondansetron (ZOFRAN-ODT) 4 mg disintegrating tablet, Take 1 tablet (4 mg total) by mouth every 6 (six) hours as needed for nausea or vomiting, Disp: 20 tablet, Rfl: 0    Qvar RediHaler 80 MCG/ACT inhaler, Inhale 2 puffs 2 (two) times a day as needed (cough) Rinse mouth after use., Disp: 10.6 g, Rfl: 5    sertraline (ZOLOFT) 100 mg tablet, TAKE ONE TABLET (100 MG) BY MOUTH DAILY, Disp: , Rfl:     Zepbound 2.5 MG/0.5ML auto-injector, INJECT 2.5 MG SUBCUTANEOUSLY EVERY 7 DAYS, Disp: , Rfl:               Whom besides the patient is providing clinical information about today's encounter?   NO ADDITIONAL HISTORIAN (patient alone provided history)    Physical Exam and Assessment/Plan by Diagnosis:    ACNE VULGARIS (\"COMMON ACNE\")    Physical Exam:  Anatomic Location Affected:  face  Morphological Description: few scattered papules, PIH, and mild " scarring      Additional History of Present Condition:  pt has a history of acne and is wondering what she could use. Currently using OTC acne wash     Treatment plan:Based on a thorough discussion of this condition and the management approach to it (including a comprehensive discussion of the known risks, side effects and potential benefits of treatment), the patient (family) agrees to implement the following specific plan:    Topical medications:    Start a Benzoyl- Peroxide or Salicylic acid face wash daily. Can use Differin gel to help with scarring and comedones. Use a daily moisturizer like CeraVe or Cetaphil, for the morning use a moisturizer with SPF in it.        SEBORRHEIC DERMATITIS     Physical Exam:  Anatomic Location Affected &  Morphological Description:  Greasy adherent scale/scaling plaques on the scalp    Additional History of Present Condition:    Duration: many months  Attempted treatments: none     Assessment and Plan:  History and physical exam most consistent with seborrheic dermatitis. The chronic nature of this condition and association with normal skin yeast were reviewed. Educated that the goal of therapy is to control symptoms, but this is not typically something we cure and intermittent flares can be expected. Based on a thorough discussion of this condition and the management approach to it (including a comprehensive discussion of the known risks, side effects and potential benefits of treatment), the patient (family) agrees to implement the following specific plan:           SCALP  - Start ketoconazole 2% shampoo 2x/week to damp scalp, let sit 5-10 minutes then rinse (may use normal shampoo/conditioner thereafter as desired)    DERMATOFIBROMA    Physical Exam:  Anatomic Location Affected:  left lower leg  Morphological Description: firm dark brown papule      Additional History of Present Condition:  Denies pain, itch, bleeding. No treatments tried.     Assessment and Plan:  Based on a  thorough discussion of this condition and the management approach to it (including a comprehensive discussion of the known risks, side effects and potential benefits of treatment), the patient (family) agrees to implement the following specific plan:  Benign, reassurance provided to patient  No features concerning for malignancy on both clinical and dermatoscopic exam today     CAFE AU LAIT MACULE    Physical Exam:  Anatomic Location Affected:  left thigh  Morphological Description:  light brown irregular patch     Additional History of Present Condition:  noted on exam    Assessment and Plan:  Based on a thorough discussion of this condition and the management approach to it (including a comprehensive discussion of the known risks, side effects and potential benefits of treatment), the patient (family) agrees to implement the following specific plan:  Reassured benign    SEBORRHEIC KERATOSIS   Physical Exam:  Anatomic Location Affected:  right lateral breast  Morphological Description:  black verrucous papule     Additional History of Present Condition: Denies pain, itch, bleeding. No treatments tried.         Assessment and Plan:  Based on a thorough discussion of this condition and the management approach to it (including a comprehensive discussion of the known risks, side effects and potential benefits of treatment), the patient (family) agrees to implement the following specific plan:   Benign, reassurance provided to patient  No features concerning for malignancy on both clinical and dermatoscopic exam today     Scribe Attestation      I,:  Grace Flowers MA am acting as a scribe while in the presence of the attending physician.:       I,:  Harley Bianchi MD personally performed the services described in this documentation    as scribed in my presence.:           Patrizia Abbott MD  Dermatology, PGY-2

## 2025-05-27 ENCOUNTER — APPOINTMENT (EMERGENCY)
Dept: CT IMAGING | Facility: HOSPITAL | Age: 35
End: 2025-05-27
Payer: COMMERCIAL

## 2025-05-27 ENCOUNTER — HOSPITAL ENCOUNTER (EMERGENCY)
Facility: HOSPITAL | Age: 35
Discharge: HOME | End: 2025-05-27
Attending: EMERGENCY MEDICINE | Admitting: EMERGENCY MEDICINE
Payer: COMMERCIAL

## 2025-05-27 VITALS
HEART RATE: 86 BPM | SYSTOLIC BLOOD PRESSURE: 155 MMHG | OXYGEN SATURATION: 100 % | DIASTOLIC BLOOD PRESSURE: 99 MMHG | RESPIRATION RATE: 16 BRPM | TEMPERATURE: 98.7 F

## 2025-05-27 DIAGNOSIS — R11.2 NAUSEA AND VOMITING: ICD-10-CM

## 2025-05-27 DIAGNOSIS — R10.31 RLQ ABDOMINAL PAIN: Primary | ICD-10-CM

## 2025-05-27 DIAGNOSIS — R31.9 HEMATURIA: ICD-10-CM

## 2025-05-27 DIAGNOSIS — D21.9 FIBROIDS: ICD-10-CM

## 2025-05-27 LAB
ALBUMIN SERPL BCG-MCNC: 4.5 G/DL (ref 3.5–5)
ALP SERPL-CCNC: 70 U/L (ref 34–104)
ALT SERPL W P-5'-P-CCNC: 17 U/L (ref 7–52)
ANION GAP SERPL CALCULATED.3IONS-SCNC: 12 MMOL/L (ref 4–13)
AST SERPL W P-5'-P-CCNC: 29 U/L (ref 13–39)
BACTERIA UR QL AUTO: ABNORMAL /HPF
BASOPHILS # BLD AUTO: 0.06 THOUSANDS/ÂΜL (ref 0–0.1)
BASOPHILS NFR BLD AUTO: 1 % (ref 0–1)
BILIRUB SERPL-MCNC: 1 MG/DL (ref 0.2–1)
BILIRUB UR QL STRIP: NEGATIVE
BUN SERPL-MCNC: 14 MG/DL (ref 5–25)
CALCIUM SERPL-MCNC: 9.2 MG/DL (ref 8.4–10.2)
CHLORIDE SERPL-SCNC: 106 MMOL/L (ref 96–108)
CLARITY UR: CLEAR
CO2 SERPL-SCNC: 21 MMOL/L (ref 21–32)
COLOR UR: ABNORMAL
CREAT SERPL-MCNC: 0.75 MG/DL (ref 0.6–1.3)
EOSINOPHIL # BLD AUTO: 0.31 THOUSAND/ÂΜL (ref 0–0.61)
EOSINOPHIL NFR BLD AUTO: 3 % (ref 0–6)
ERYTHROCYTE [DISTWIDTH] IN BLOOD BY AUTOMATED COUNT: 12.7 % (ref 11.6–15.1)
EXT PREGNANCY TEST URINE: NEGATIVE
EXT. CONTROL: NORMAL
GFR SERPL CREATININE-BSD FRML MDRD: 105 ML/MIN/1.73SQ M
GLUCOSE SERPL-MCNC: 85 MG/DL (ref 65–140)
GLUCOSE UR STRIP-MCNC: ABNORMAL MG/DL
HCT VFR BLD AUTO: 39 % (ref 34.8–46.1)
HGB BLD-MCNC: 13.8 G/DL (ref 11.5–15.4)
HGB UR QL STRIP.AUTO: ABNORMAL
IMM GRANULOCYTES # BLD AUTO: 0.03 THOUSAND/UL (ref 0–0.2)
IMM GRANULOCYTES NFR BLD AUTO: 0 % (ref 0–2)
KETONES UR STRIP-MCNC: ABNORMAL MG/DL
LEUKOCYTE ESTERASE UR QL STRIP: NEGATIVE
LIPASE SERPL-CCNC: 19 U/L (ref 11–82)
LYMPHOCYTES # BLD AUTO: 1.17 THOUSANDS/ÂΜL (ref 0.6–4.47)
LYMPHOCYTES NFR BLD AUTO: 10 % (ref 14–44)
MCH RBC QN AUTO: 31.9 PG (ref 26.8–34.3)
MCHC RBC AUTO-ENTMCNC: 35.4 G/DL (ref 31.4–37.4)
MCV RBC AUTO: 90 FL (ref 82–98)
MONOCYTES # BLD AUTO: 0.58 THOUSAND/ÂΜL (ref 0.17–1.22)
MONOCYTES NFR BLD AUTO: 5 % (ref 4–12)
MUCOUS THREADS UR QL AUTO: ABNORMAL
NEUTROPHILS # BLD AUTO: 9.27 THOUSANDS/ÂΜL (ref 1.85–7.62)
NEUTS SEG NFR BLD AUTO: 81 % (ref 43–75)
NITRITE UR QL STRIP: NEGATIVE
NON-SQ EPI CELLS URNS QL MICRO: ABNORMAL /HPF
NRBC BLD AUTO-RTO: 0 /100 WBCS
PH UR STRIP.AUTO: 5.5 [PH]
PLATELET # BLD AUTO: 251 THOUSANDS/UL (ref 149–390)
PMV BLD AUTO: 9.6 FL (ref 8.9–12.7)
POTASSIUM SERPL-SCNC: 3.3 MMOL/L (ref 3.5–5.3)
PROT SERPL-MCNC: 7.9 G/DL (ref 6.4–8.4)
PROT UR STRIP-MCNC: ABNORMAL MG/DL
RBC # BLD AUTO: 4.32 MILLION/UL (ref 3.81–5.12)
RBC #/AREA URNS AUTO: ABNORMAL /HPF
SODIUM SERPL-SCNC: 139 MMOL/L (ref 135–147)
SP GR UR STRIP.AUTO: 1.03 (ref 1–1.03)
UROBILINOGEN UR STRIP-ACNC: <2 MG/DL
WBC # BLD AUTO: 11.42 THOUSAND/UL (ref 4.31–10.16)
WBC #/AREA URNS AUTO: ABNORMAL /HPF

## 2025-05-27 PROCEDURE — 81025 URINE PREGNANCY TEST: CPT

## 2025-05-27 PROCEDURE — 96375 TX/PRO/DX INJ NEW DRUG ADDON: CPT

## 2025-05-27 PROCEDURE — 36415 COLL VENOUS BLD VENIPUNCTURE: CPT

## 2025-05-27 PROCEDURE — 96366 THER/PROPH/DIAG IV INF ADDON: CPT

## 2025-05-27 PROCEDURE — 81001 URINALYSIS AUTO W/SCOPE: CPT

## 2025-05-27 PROCEDURE — 74177 CT ABD & PELVIS W/CONTRAST: CPT

## 2025-05-27 PROCEDURE — 99285 EMERGENCY DEPT VISIT HI MDM: CPT | Performed by: EMERGENCY MEDICINE

## 2025-05-27 PROCEDURE — 80053 COMPREHEN METABOLIC PANEL: CPT

## 2025-05-27 PROCEDURE — 85025 COMPLETE CBC W/AUTO DIFF WBC: CPT

## 2025-05-27 PROCEDURE — 96365 THER/PROPH/DIAG IV INF INIT: CPT

## 2025-05-27 PROCEDURE — 96361 HYDRATE IV INFUSION ADD-ON: CPT

## 2025-05-27 PROCEDURE — 83690 ASSAY OF LIPASE: CPT

## 2025-05-27 PROCEDURE — 99284 EMERGENCY DEPT VISIT MOD MDM: CPT

## 2025-05-27 RX ORDER — METOCLOPRAMIDE 10 MG/1
10 TABLET ORAL EVERY 6 HOURS
Qty: 30 TABLET | Refills: 0 | Status: SHIPPED | OUTPATIENT
Start: 2025-05-27

## 2025-05-27 RX ORDER — ONDANSETRON 4 MG/1
4 TABLET, ORALLY DISINTEGRATING ORAL ONCE
Status: COMPLETED | OUTPATIENT
Start: 2025-05-27 | End: 2025-05-27

## 2025-05-27 RX ORDER — KETOROLAC TROMETHAMINE 30 MG/ML
15 INJECTION, SOLUTION INTRAMUSCULAR; INTRAVENOUS ONCE
Status: COMPLETED | OUTPATIENT
Start: 2025-05-27 | End: 2025-05-27

## 2025-05-27 RX ORDER — ACETAMINOPHEN 10 MG/ML
1000 INJECTION, SOLUTION INTRAVENOUS ONCE
Status: COMPLETED | OUTPATIENT
Start: 2025-05-27 | End: 2025-05-27

## 2025-05-27 RX ORDER — KETOROLAC TROMETHAMINE 10 MG/1
10 TABLET, FILM COATED ORAL EVERY 6 HOURS PRN
Qty: 20 TABLET | Refills: 0 | Status: SHIPPED | OUTPATIENT
Start: 2025-05-27 | End: 2025-06-01

## 2025-05-27 RX ORDER — METOCLOPRAMIDE HYDROCHLORIDE 5 MG/ML
10 INJECTION INTRAMUSCULAR; INTRAVENOUS ONCE
Status: COMPLETED | OUTPATIENT
Start: 2025-05-27 | End: 2025-05-27

## 2025-05-27 RX ORDER — KETOROLAC TROMETHAMINE 10 MG/1
10 TABLET, FILM COATED ORAL EVERY 6 HOURS PRN
Qty: 20 TABLET | Refills: 0 | Status: SHIPPED | OUTPATIENT
Start: 2025-05-27 | End: 2025-05-27

## 2025-05-27 RX ADMIN — SODIUM CHLORIDE 1000 ML: 0.9 INJECTION, SOLUTION INTRAVENOUS at 19:31

## 2025-05-27 RX ADMIN — ACETAMINOPHEN 1000 MG: 10 INJECTION INTRAVENOUS at 19:31

## 2025-05-27 RX ADMIN — IOHEXOL 100 ML: 350 INJECTION, SOLUTION INTRAVENOUS at 20:07

## 2025-05-27 RX ADMIN — METOCLOPRAMIDE 10 MG: 5 INJECTION, SOLUTION INTRAMUSCULAR; INTRAVENOUS at 19:31

## 2025-05-27 RX ADMIN — ONDANSETRON 4 MG: 4 TABLET, ORALLY DISINTEGRATING ORAL at 17:57

## 2025-05-27 RX ADMIN — KETOROLAC TROMETHAMINE 15 MG: 30 INJECTION, SOLUTION INTRAMUSCULAR; INTRAVENOUS at 19:31

## 2025-05-27 NOTE — Clinical Note
Celi Waller was seen and treated in our emergency department on 5/27/2025.                Diagnosis:     Celi  may return to work on return date, is off the rest of the shift today.    She may return on this date: 05/29/2025         If you have any questions or concerns, please don't hesitate to call.      Janna Stone MD    ______________________________           _______________          _______________  Hospital Representative                              Date                                Time

## 2025-05-27 NOTE — ED PROVIDER NOTES
Time reflects when diagnosis was documented in both MDM as applicable and the Disposition within this note       Time User Action Codes Description Comment    5/27/2025 10:00 PM Janna Stone Kacey Add [R10.31] RLQ abdominal pain     5/27/2025 10:00 PM Janna Stone Kacey Add [D21.9] Fibroids     5/27/2025 10:00 PM ChaseJanna Kacey Add [R11.2] Nausea and vomiting     5/27/2025 10:00 PM ChaseJanna Add [R31.9] Hematuria           ED Disposition       ED Disposition   Discharge    Condition   Stable    Date/Time   Tue May 27, 2025 10:00 PM    Comment   Celi Waller discharge to home/self care.                   Assessment & Plan       Medical Decision Making  34-year-old female with history of Hashimoto's thyroiditis status post partial thyroidectomy, IBS, GERD, anemia, asthma who presents to the Emergency Department for evaluation of right lower quadrant pain as well as nausea and vomiting that came on today.  She states the pain comes and goes and is at times very severe.  She had an episode of nonbilious nonbloody vomiting.  She has been having regular stools, no vaginal bleeding.  Denies any pain with urination, urgency or frequency.  She has not had any fevers, no flank pain.  No prior abdominal surgeries.  She is not aware of any ovarian cyst or prior ovarian problems that she has had.  She is not having any abnormal vaginal discharge.  She was here approximately 2 months ago with the same type of pain in the same place, she was diagnosed at the time with fibroids and an appendicolith.    Differential diagnosis includes is not limited to: Appendicitis, colitis, gastroenteritis, epiploic appendagitis, UTI, kidney stone    Here the department, the patient is hemodynamically stable and afebrile, nontachycardic with normal work of breathing satting on percent on room air.  She is alert and oriented x 3, speaking in full sentences.  On exam she has tenderness in the right lower quadrant, without rebound or  guarding.  No signs of elvin peritonitis.  The abdomen is otherwise soft and nondistended.  There is no CVA tenderness, no hernias.    UA here shows mild dehydration with 1+ ketones, negative nitrite no bacteria.  She does have very mild hematuria, discussed this with her and recommend that she call with her PCP for repeat UA to evaluate this.  Urine pregnancy is negative.  CT imaging shows fibroids, but no evidence of appendicitis or bowel pathology.  Discussed this with the patient.  She feels slightly better after fluids and pain control here, no repeated episodes of vomiting.  Did encourage her to follow-up with her GYN to discuss that this could be a fibroid pain related.  Also discussed very strict return precautions.  Encouraged her to follow-up with her PCP within the next week for reevaluation.    Amount and/or Complexity of Data Reviewed  Labs: ordered. Decision-making details documented in ED Course.  Radiology: ordered.    Risk  Prescription drug management.        ED Course as of 05/31/25 0646   Tue May 27, 2025   1945 PREGNANCY TEST URINE: Negative   2152 IMPRESSION:     No acute findings in the abdomen or pelvis.     Trace pelvic free fluid likely physiologic.     Normal appendix visualized.     2156 On reevaluation, patient has some improvement in pain and nausea.  No repeated vomiting.  Discussed the results of the scan, specifically let her know that she does have evidence of fibroids, not entirely sure if this is the cause of her pain.  Will send her home with some Toradol and some Reglan.  She does have [facility] follow-up in 2 weeks.  Discussed strict return precautions.  She is amenable with this plan and discharge home at this time.       Medications   ondansetron (ZOFRAN-ODT) dispersible tablet 4 mg (4 mg Oral Given 5/27/25 1757)   ketorolac (TORADOL) injection 15 mg (15 mg Intravenous Given 5/27/25 1931)   acetaminophen (Ofirmev) injection 1,000 mg (0 mg Intravenous Stopped 5/27/25 2132)    metoclopramide (REGLAN) injection 10 mg (10 mg Intravenous Given 5/27/25 1931)   sodium chloride 0.9 % bolus 1,000 mL (0 mL Intravenous Stopped 5/27/25 2215)   iohexol (OMNIPAQUE) 350 MG/ML injection (MULTI-DOSE) 100 mL (100 mL Intravenous Given 5/27/25 2007)       ED Risk Strat Scores                    No data recorded                            History of Present Illness       Chief Complaint   Patient presents with    Abdominal Pain     Pt reports RLQ abdominal pain since today. +N/V. Was evaluated a month ago for similar symptoms and told to come to ER if pain came back.       Past Medical History[1]   Past Surgical History[2]   Family History[3]   Social History[4]   E-Cigarette/Vaping    E-Cigarette Use Former User       E-Cigarette/Vaping Substances    Nicotine No     THC Yes     CBD No     Flavoring No     Other No     Unknown No       I have reviewed and agree with the history as documented.     34-year-old female with history of Hashimoto's thyroiditis status post partial thyroidectomy, IBS, GERD, anemia, asthma who presents to the Emergency Department for evaluation of right lower quadrant pain as well as nausea and vomiting that came on today.  She states the pain comes and goes and is at times very severe.  She had an episode of nonbilious nonbloody vomiting.  She has been having regular stools, no vaginal bleeding.  Denies any pain with urination, urgency or frequency.  She has not had any fevers, no flank pain.  No prior abdominal surgeries.  She is not aware of any ovarian cyst or prior ovarian problems that she has had.  She is not having any abnormal vaginal discharge.          Review of Systems   Constitutional:  Negative for chills and fever.   HENT:  Negative for ear pain and sore throat.    Eyes:  Negative for visual disturbance.   Respiratory:  Negative for cough and shortness of breath.    Cardiovascular:  Negative for chest pain and leg swelling.   Gastrointestinal:  Positive for  abdominal pain and nausea. Negative for blood in stool, constipation, diarrhea and vomiting.   Genitourinary:  Negative for dysuria and hematuria.   Musculoskeletal:  Negative for neck pain and neck stiffness.   Neurological:  Negative for dizziness, syncope, weakness and light-headedness.   All other systems reviewed and are negative.          Objective       ED Triage Vitals [05/27/25 1750]   Temperature Pulse Blood Pressure Respirations SpO2 Patient Position - Orthostatic VS   98.7 °F (37.1 °C) 86 155/99 16 100 % Sitting      Temp Source Heart Rate Source BP Location FiO2 (%) Pain Score    Oral Monitor Right arm -- 6      Vitals      Date and Time Temp Pulse SpO2 Resp BP Pain Score FACES Pain Rating User   05/27/25 1750 98.7 °F (37.1 °C) 86 100 % 16 155/99 6 -- RL            Physical Exam  Vitals and nursing note reviewed.   Constitutional:       General: She is not in acute distress.     Appearance: She is well-developed. She is not toxic-appearing or diaphoretic.   HENT:      Head: Normocephalic and atraumatic.     Eyes:      Conjunctiva/sclera: Conjunctivae normal.       Cardiovascular:      Rate and Rhythm: Normal rate and regular rhythm.      Heart sounds: No murmur heard.  Pulmonary:      Effort: Pulmonary effort is normal. No respiratory distress.      Breath sounds: Normal breath sounds. No wheezing or rales.   Abdominal:      General: Bowel sounds are normal. There is no distension.      Palpations: Abdomen is soft. There is no shifting dullness, fluid wave, hepatomegaly, splenomegaly, mass or pulsatile mass.      Tenderness: There is abdominal tenderness in the right lower quadrant. There is no right CVA tenderness, left CVA tenderness, guarding or rebound.      Hernia: No hernia is present.     Musculoskeletal:         General: No swelling.      Cervical back: Neck supple.     Skin:     General: Skin is warm and dry.      Capillary Refill: Capillary refill takes less than 2 seconds.     Neurological:       General: No focal deficit present.      Mental Status: She is alert and oriented to person, place, and time.     Psychiatric:         Mood and Affect: Mood normal.         Results Reviewed       Procedure Component Value Units Date/Time    Urine Microscopic [[id number]]  (Abnormal) Collected: 05/27/25 1924    Lab Status: Final result Specimen: Urine, Clean Catch Updated: 05/27/25 1955     RBC, UA 2-4 /hpf      WBC, UA 1-2 /hpf      Epithelial Cells Occasional /hpf      Bacteria, UA None Seen /hpf      MUCUS THREADS Occasional    UA w Reflex to Microscopic w Reflex to Culture [[id number]]  (Abnormal) Collected: 05/27/25 1924    Lab Status: Final result Specimen: Urine, Clean Catch Updated: 05/27/25 1954     Color, UA Light Yellow     Clarity, UA Clear     Specific Gravity, UA 1.027     pH, UA 5.5     Leukocytes, UA Negative     Nitrite, UA Negative     Protein, UA 30 (1+) mg/dl      Glucose, UA Trace mg/dl      Ketones, UA 20 (1+) mg/dl      Urobilinogen, UA <2.0 mg/dl      Bilirubin, UA Negative     Occult Blood, UA Large    POCT pregnancy, urine [[id number]]  (Normal) Collected: 05/27/25 1927    Lab Status: Final result Updated: 05/27/25 1927     EXT Preg Test, Ur Negative     Control Valid    Comprehensive metabolic panel [[id number]]  (Abnormal) Collected: 05/27/25 1757    Lab Status: Final result Specimen: Blood from Arm, Left Updated: 05/27/25 1827     Sodium 139 mmol/L      Potassium 3.3 mmol/L      Chloride 106 mmol/L      CO2 21 mmol/L      ANION GAP 12 mmol/L      BUN 14 mg/dL      Creatinine 0.75 mg/dL      Glucose 85 mg/dL      Calcium 9.2 mg/dL      AST 29 U/L      ALT 17 U/L      Alkaline Phosphatase 70 U/L      Total Protein 7.9 g/dL      Albumin 4.5 g/dL      Total Bilirubin 1.00 mg/dL      eGFR 105 ml/min/1.73sq m     Narrative:      [employer] guidelines for Chronic Kidney Disease (CKD):     Stage 1 with normal or high GFR (GFR > 90 mL/min/1.73 square meters)    Stage 2 Mild  CKD (GFR = 60-89 mL/min/1.73 square meters)    Stage 3A Moderate CKD (GFR = 45-59 mL/min/1.73 square meters)    Stage 3B Moderate CKD (GFR = 30-44 mL/min/1.73 square meters)    Stage 4 Severe CKD (GFR = 15-29 mL/min/1.73 square meters)    Stage 5 End Stage CKD (GFR <15 mL/min/1.73 square meters)  Note: GFR calculation is accurate only with a steady state creatinine    Lipase [[id number]]  (Normal) Collected: 05/27/25 1757    Lab Status: Final result Specimen: Blood from Arm, Left Updated: 05/27/25 1827     Lipase 19 u/L     CBC and differential [[id number]]  (Abnormal) Collected: 05/27/25 1757    Lab Status: Final result Specimen: Blood from Arm, Left Updated: 05/27/25 1804     WBC 11.42 Thousand/uL      RBC 4.32 Million/uL      Hemoglobin 13.8 g/dL      Hematocrit 39.0 %      MCV 90 fL      MCH 31.9 pg      MCHC 35.4 g/dL      RDW 12.7 %      MPV 9.6 fL      Platelets 251 Thousands/uL      nRBC 0 /100 WBCs      Segmented % 81 %      Immature Grans % 0 %      Lymphocytes % 10 %      Monocytes % 5 %      Eosinophils Relative 3 %      Basophils Relative 1 %      Absolute Neutrophils 9.27 Thousands/µL      Absolute Immature Grans 0.03 Thousand/uL      Absolute Lymphocytes 1.17 Thousands/µL      Absolute Monocytes 0.58 Thousand/µL      Eosinophils Absolute 0.31 Thousand/µL      Basophils Absolute 0.06 Thousands/µL             CT abdomen pelvis with contrast   Final Interpretation by Dominik Zuniga MD (05/27 2130)      No acute findings in the abdomen or pelvis.      Trace pelvic free fluid likely physiologic.      Normal appendix visualized.         Workstation performed: [id number]             Procedures    ED Medication and Procedure Management   Prior to Admission Medications   Prescriptions Last Dose Informant Patient Reported? Taking?   ALPRAZolam (XANAX PO)  Self Yes No   Sig: Take by mouth as needed   Qvar RediHaler 80 MCG/ACT inhaler  Self No No   Sig: Inhale 2 puffs 2 (two) times a day as needed (cough) Rinse  mouth after use.   Zepbound 2.5 MG/0.5ML auto-injector  Self Yes No   Sig: INJECT 2.5 MG SUBCUTANEOUSLY EVERY 7 DAYS   albuterol (PROVENTIL HFA,VENTOLIN HFA) 90 mcg/act inhaler  Self Yes No   Sig: Inhale 2 puffs every 4 (four) hours as needed for wheezing   busPIRone (BUSPAR) 5 mg tablet  Self Yes No   Sig: Take 5 mg by mouth 3 (three) times a day   ergocalciferol (VITAMIN D2) 50,000 units  Self Yes No   fluticasone (FLONASE) 50 mcg/act nasal spray  Self Yes No   ketoconazole (NIZORAL) 2 % shampoo   No No   Sig: Use as shampoo at least 3 times per week to scalp. Lather into scalp and let sit for 5 minutes before rinsing.   montelukast (SINGULAIR) 10 mg tablet   No No   Sig: TAKE ONE TABLET ( 10MG) BY MOUTH DAILY STARTING 2 DAYS BEFORE AND THE DAY OF [facility] IMMUNOTHERAPY.   norethindrone-ethinyl estradiol (JUNEL FE 1/20) 1-20 MG-MCG per tablet   No No   Sig: Take one active pill po daily x 9 weeks then one week of inactive pills x one week.   ondansetron (ZOFRAN-ODT) 4 mg disintegrating tablet   No No   Sig: Take 1 tablet (4 mg total) by mouth every 6 (six) hours as needed for nausea or vomiting   ondansetron (Zofran ODT) 4 mg disintegrating tablet  Self No No   Sig: Take 1 tablet (4 mg total) by mouth every 6 (six) hours as needed for nausea or vomiting   sertraline (ZOLOFT) 100 mg tablet  Self Yes No   Sig: TAKE ONE TABLET (100 MG) BY MOUTH DAILY      Facility-Administered Medications: None     Discharge Medication List as of 5/27/2025 10:03 PM        START taking these medications    Details   metoclopramide (Reglan) 10 mg tablet Take 1 tablet (10 mg total) by mouth every 6 (six) hours, Starting Tue 5/27/2025, Normal      ketorolac (TORADOL) 10 mg tablet Take 1 tablet (10 mg total) by mouth every 6 (six) hours as needed for moderate pain for up to 5 days, Starting Tue 5/27/2025, Until Sun 6/1/2025 at 2359, Print           CONTINUE these medications which have NOT CHANGED    Details   albuterol (PROVENTIL  HFA,VENTOLIN HFA) 90 mcg/act inhaler Inhale 2 puffs every 4 (four) hours as needed for wheezing, Historical Med      ALPRAZolam (XANAX PO) Take by mouth as needed, Historical Med      busPIRone (BUSPAR) 5 mg tablet Take 5 mg by mouth 3 (three) times a day, Historical Med      ergocalciferol (VITAMIN D2) 50,000 units Historical Med      fluticasone (FLONASE) 50 mcg/act nasal spray Historical Med      ketoconazole (NIZORAL) 2 % shampoo Use as shampoo at least 3 times per week to scalp. Lather into scalp and let sit for 5 minutes before rinsing., Normal      montelukast (SINGULAIR) 10 mg tablet TAKE ONE TABLET ( 10MG) BY MOUTH DAILY STARTING 2 DAYS BEFORE AND THE DAY OF [facility] IMMUNOTHERAPY., Normal      norethindrone-ethinyl estradiol (JUNEL FE 1/20) 1-20 MG-MCG per tablet Take one active pill po daily x 9 weeks then one week of inactive pills x one week., Normal      !! ondansetron (Zofran ODT) 4 mg disintegrating tablet Take 1 tablet (4 mg total) by mouth every 6 (six) hours as needed for nausea or vomiting, Starting Fri 5/6/2022, Normal      !! ondansetron (ZOFRAN-ODT) 4 mg disintegrating tablet Take 1 tablet (4 mg total) by mouth every 6 (six) hours as needed for nausea or vomiting, Starting Mon 3/17/2025, Normal      Qvar RediHaler 80 MCG/ACT inhaler Inhale 2 puffs 2 (two) times a day as needed (cough) Rinse mouth after use., Starting Thu 2/29/2024, Normal      sertraline (ZOLOFT) 100 mg tablet TAKE ONE TABLET (100 MG) BY MOUTH DAILY, Historical Med      Zepbound 2.5 MG/0.5ML auto-injector INJECT 2.5 MG SUBCUTANEOUSLY EVERY 7 DAYS, Historical Med       !! - Potential duplicate medications found. Please discuss with provider.        No discharge procedures on file.  ED SEPSIS DOCUMENTATION   Time reflects when diagnosis was documented in both MDM as applicable and the Disposition within this note       Time User Action Codes Description Comment    5/27/2025 10:00 PM Janna Stone Add [R10.31] RLQ abdominal pain      5/27/2025 10:00 PM Chase Janna Kacey Add [D21.9] Fibroids     5/27/2025 10:00 PM Janna Stone Add [R11.2] Nausea and vomiting     5/27/2025 10:00 PM Janna Stone Add [R31.9] Hematuria                    [1]   Past Medical History:  Diagnosis Date    Anemia     Anxiety     Asthma     Depression     Disease of thyroid gland     GERD (gastroesophageal reflux disease)     Hypothyroidism     Varicella    [2]   Past Surgical History:  Procedure Laterality Date    COLONOSCOPY  2015    EGD  04/06/2021    THYROIDECTOMY, PARTIAL  2017    UPPER GASTROINTESTINAL ENDOSCOPY      US GUIDED THYROID BIOPSY  3/23/2016   [3]   Family History  Problem Relation Name Age of Onset    Anxiety disorder Mother Verena     Diabetes Mother Verena     Depression Mother Verena     Fibroids Mother Verena     Infertility Mother Verena     Asthma Mother Verena     Breast cancer Mother Verena     Allergies Mother Veerna     Asthma Father Romain     Hypertension Father Romain     Arthritis Father Romain     Anxiety disorder Father Romain     Hyperlipidemia Father Romain     Allergies Brother      Asthma Brother      Sickle cell trait Brother      Heart murmur Brother      Asthma Brother      Autism Brother      Bleeding Disorder Brother      Seizures Brother      Diabetes Maternal Grandfather Simon     Breast cancer Paternal Grandmother Deep     Cancer Paternal Grandmother Deep         thyroid    Thyroid disease Paternal Grandmother Deep     Heart murmur Son     [4]   Social History  Tobacco Use    Smoking status: Former     Current packs/day: 0.50     Types: Cigarettes    Smokeless tobacco: Never    Tobacco comments:     socially   Vaping Use    Vaping status: Former    Substances: THC   Substance Use Topics    Alcohol use: Yes     Alcohol/week: 9.0 standard drinks of alcohol     Types: 2 Glasses of wine, 4 Shots of liquor, 3 Standard drinks or equivalent per week     Comment: 1 drink daily    Drug use: Not Currently     Types:  Marijuana     Comment: Medical card        Janna Stone MD  05/31/25 0600

## 2025-05-27 NOTE — ED ATTENDING ATTESTATION
5/27/2025  IRinku DO, saw and evaluated the patient. I have discussed the patient with the resident/non-physician practitioner and agree with the resident's/non-physician practitioner's findings, Plan of Care, and MDM as documented in the resident's/non-physician practitioner's note, except where noted. All available labs and Radiology studies were reviewed.  I was present for key portions of any procedure(s) performed by the resident/non-physician practitioner and I was immediately available to provide assistance.       At this point I agree with the current assessment done in the Emergency Department.  I have conducted an independent evaluation of this patient a history and physical is as follows:          1. RLQ abdominal pain    2. Fibroids    3. Nausea and vomiting    4. Hematuria            MDM  Number of Diagnoses or Management Options  Fibroids  Hematuria  Nausea and vomiting  RLQ abdominal pain  Diagnosis management comments:       Initial ED assessment:     34-year-old female, right lower quadrant abdominal pain and tenderness known appendicolith    Pathology at risk for includes but is not limited to:    High clinical concern for appendicitis.  Consider ovarian cyst ovarian torsion, epiploic appendagitis, mesenteric adenitis    Initial ED plan:   Blood work CT scan ultimate decision pending ED workup.        Final ED summary/disposition:   After evaluation and workup in the emergency department, ED workup unremarkable with known fibroids.  Unclear exact etiology of her discomfort.  Patient discharged.  Follow with outpatient providers               Time reflects when diagnosis was documented in both MDM as applicable and the Disposition within this note       Time User Action Codes Description Comment    5/27/2025 10:00 PM Janna Stone Add [R10.31] RLQ abdominal pain     5/27/2025 10:00 PM Janna Stnoe Add [D21.9] Fibroids     5/27/2025 10:00 PM Janna Stone Add [R11.2]  Nausea and vomiting     5/27/2025 10:00 PM Janna Stone Add [R31.9] Hematuria           ED Disposition       ED Disposition   Discharge    Condition   Stable    Date/Time   Tue May 27, 2025 10:00 PM    Comment   Celi Waller discharge to home/self care.                   Follow-up Information       Follow up With Specialties Details Why Contact Info    Eugenio Mantilla MD Family Medicine Schedule an appointment as soon as possible for a visit in 1 week for repeat UA to evaluate for hematuria [address] [place] [address]  [address] [place] [address]  [phone]      your gynecologist  Go to  to discuss fibroids                           Chief Complaint   Patient presents with    Abdominal Pain     Pt reports RLQ abdominal pain since today. +N/V. Was evaluated a month ago for similar symptoms and told to come to ER if pain came back.             34-year-old female presents with right lower quadrant abdominal pain.  Started around 9 AM this morning progressively worsening throughout the day associate with nausea no vomiting.  Worse with palpation.  Seen here recently had a CAT scan was told she had an appendicolith, to return if she had worsening pain.  No falls injury or trauma.  Decreased appetite today.                    Visit Vitals  /99 (BP Location: Right arm)   Pulse 86   Temp 98.7 °F (37.1 °C) (Oral)   Resp 16   LMP  (LMP Unknown)   SpO2 100%   OB Status Birth Control   Smoking Status Former        Physical Exam  Vitals reviewed.   Constitutional:       General: She is not in acute distress.     Appearance: She is well-developed. She is not diaphoretic.   HENT:      Head: Normocephalic and atraumatic.      Right Ear: External ear normal.      Left Ear: External ear normal.      Nose: Nose normal.     Eyes:      General:         Right eye: No discharge.         Left eye: No discharge.      Pupils: Pupils are equal, round, and reactive to light.     Neck:      Trachea: No tracheal deviation.      Cardiovascular:      Rate and Rhythm: Normal rate and regular rhythm.      Heart sounds: Normal heart sounds. No murmur heard.  Pulmonary:      Effort: Pulmonary effort is normal. No respiratory distress.      Breath sounds: Normal breath sounds. No stridor.   Abdominal:      General: There is no distension.      Palpations: Abdomen is soft.      Tenderness: There is abdominal tenderness in the right lower quadrant. There is no guarding or rebound. Positive signs include McBurney's sign.     Musculoskeletal:         General: Normal range of motion.      Cervical back: Normal range of motion and neck supple.     Skin:     General: Skin is warm and dry.      Coloration: Skin is not pale.      Findings: No erythema.     Neurological:      General: No focal deficit present.      Mental Status: She is alert and oriented to person, place, and time.                       Medications   ondansetron (ZOFRAN-ODT) dispersible tablet 4 mg (4 mg Oral Given 5/27/25 1757)   ketorolac (TORADOL) injection 15 mg (15 mg Intravenous Given 5/27/25 1931)   acetaminophen (Ofirmev) injection 1,000 mg (0 mg Intravenous Stopped 5/27/25 2132)   metoclopramide (REGLAN) injection 10 mg (10 mg Intravenous Given 5/27/25 1931)   sodium chloride 0.9 % bolus 1,000 mL (0 mL Intravenous Stopped 5/27/25 2215)   iohexol (OMNIPAQUE) 350 MG/ML injection (MULTI-DOSE) 100 mL (100 mL Intravenous Given 5/27/25 2007)             Labs Reviewed   CBC AND DIFFERENTIAL - Abnormal       Result Value Ref Range Status    WBC 11.42 (*) 4.31 - 10.16 Thousand/uL Final    RBC 4.32  3.81 - 5.12 Million/uL Final    Hemoglobin 13.8  11.5 - 15.4 g/dL Final    Hematocrit 39.0  34.8 - 46.1 % Final    MCV 90  82 - 98 fL Final    MCH 31.9  26.8 - 34.3 pg Final    MCHC 35.4  31.4 - 37.4 g/dL Final    RDW 12.7  11.6 - 15.1 % Final    MPV 9.6  8.9 - 12.7 fL Final    Platelets 251  149 - 390 Thousands/uL Final    nRBC 0  /100 WBCs Final    Segmented % 81 (*) 43 - 75 % Final     Immature Grans % 0  0 - 2 % Final    Lymphocytes % 10 (*) 14 - 44 % Final    Monocytes % 5  4 - 12 % Final    Eosinophils Relative 3  0 - 6 % Final    Basophils Relative 1  0 - 1 % Final    Absolute Neutrophils 9.27 (*) 1.85 - 7.62 Thousands/µL Final    Absolute Immature Grans 0.03  0.00 - 0.20 Thousand/uL Final    Absolute Lymphocytes 1.17  0.60 - 4.47 Thousands/µL Final    Absolute Monocytes 0.58  0.17 - 1.22 Thousand/µL Final    Eosinophils Absolute 0.31  0.00 - 0.61 Thousand/µL Final    Basophils Absolute 0.06  0.00 - 0.10 Thousands/µL Final   COMPREHENSIVE METABOLIC PANEL - Abnormal    Sodium 139  135 - 147 mmol/L Final    Potassium 3.3 (*) 3.5 - 5.3 mmol/L Final    Chloride 106  96 - 108 mmol/L Final    CO2 21  21 - 32 mmol/L Final    ANION GAP 12  4 - 13 mmol/L Final    BUN 14  5 - 25 mg/dL Final    Creatinine 0.75  0.60 - 1.30 mg/dL Final    Comment: Standardized to IDMS reference method    Glucose 85  65 - 140 mg/dL Final    Comment: If the patient is fasting, the ADA then defines impaired fasting glucose as > 100 mg/dL and diabetes as > or equal to 123 mg/dL.    Calcium 9.2  8.4 - 10.2 mg/dL Final    AST 29  13 - 39 U/L Final    ALT 17  7 - 52 U/L Final    Comment: Specimen collection should occur prior to Sulfasalazine administration due to the potential for falsely depressed results.     Alkaline Phosphatase 70  34 - 104 U/L Final    Total Protein 7.9  6.4 - 8.4 g/dL Final    Albumin 4.5  3.5 - 5.0 g/dL Final    Total Bilirubin 1.00  0.20 - 1.00 mg/dL Final    Comment: Use of this assay is not recommended for patients undergoing treatment with eltrombopag due to the potential for falsely elevated results.  N-acetyl-p-benzoquinone imine (metabolite of Acetaminophen) will generate erroneously low results in samples for patients that have taken an overdose of Acetaminophen.    eGFR 105  ml/min/1.73sq m Final    Narrative:     National Kidney Disease Foundation guidelines for Chronic Kidney Disease (CKD):      Stage 1 with normal or high GFR (GFR > 90 mL/min/1.73 square meters)    Stage 2 Mild CKD (GFR = 60-89 mL/min/1.73 square meters)    Stage 3A Moderate CKD (GFR = 45-59 mL/min/1.73 square meters)    Stage 3B Moderate CKD (GFR = 30-44 mL/min/1.73 square meters)    Stage 4 Severe CKD (GFR = 15-29 mL/min/1.73 square meters)    Stage 5 End Stage CKD (GFR <15 mL/min/1.73 square meters)  Note: GFR calculation is accurate only with a steady state creatinine   UA W REFLEX TO MICROSCOPIC WITH REFLEX TO CULTURE - Abnormal    Color, UA Light Yellow   Final    Clarity, UA Clear   Final    Specific Gravity, UA 1.027  1.003 - 1.030 Final    pH, UA 5.5  4.5, 5.0, 5.5, 6.0, 6.5, 7.0, 7.5, 8.0 Final    Leukocytes, UA Negative  Negative Final    Nitrite, UA Negative  Negative Final    Protein, UA 30 (1+) (*) Negative mg/dl Final    Glucose, UA Trace (*) Negative mg/dl Final    Ketones, UA 20 (1+) (*) Negative mg/dl Final    Urobilinogen, UA <2.0  <2.0 mg/dl mg/dl Final    Bilirubin, UA Negative  Negative Final    Occult Blood, UA Large (*) Negative Final   URINE MICROSCOPIC - Abnormal    RBC, UA 2-4 (*) None Seen, 1-2 /hpf Final    WBC, UA 1-2  None Seen, 1-2 /hpf Final    Epithelial Cells Occasional  None Seen, Occasional /hpf Final    Bacteria, UA None Seen  None Seen, Occasional /hpf Final    MUCUS THREADS Occasional (*) None Seen Final   LIPASE - Normal    Lipase 19  11 - 82 u/L Final   POCT PREGNANCY, URINE - Normal    EXT Preg Test, Ur Negative  Negative Final    Control Valid  Valid Final         CT abdomen pelvis with contrast   Final Result      No acute findings in the abdomen or pelvis.      Trace pelvic free fluid likely physiologic.      Normal appendix visualized.         Workstation performed: [id number]                        Procedures

## 2025-05-28 NOTE — DISCHARGE INSTRUCTIONS
When do I need to get emergency help?   Call for an ambulance right away if:   You have sudden severe belly pain, or the pain is constant.  You have trouble breathing or chest pain along with your belly pain.  You start throwing up blood or pass a lot of blood in your stool.  Your belly becomes very hard or swollen.  You get a fever 102.2°F (39°C) or higher or shaking chills.  Return to the ED if:   You have signs of severe fluid loss, such as:  No urine for more than 8 hours.  You feel very light-headed or like you are going to pass out.  You feel weak, like you are going to fall.  Your pain gets worse, comes more often or moves to one area of the belly  You have an upset stomach or throwing up that isn’t getting better and are having trouble keeping down food and drink.  Your stools are black or tar colored.

## 2025-06-15 NOTE — PROGRESS NOTES
Name: Celi Waller      : 1990      MRN: [id number]  Encounter Provider: LILA Urena  Encounter Date: 2025   Encounter department: [facility]  :  Assessment & Plan  Encntr for gyn exam (general) (routine) w abnormal findings  Pap with high risk HPV testing every 5 years, if normal. Due   Sexually transmitted infection testing as indicated.Declined.   Exercise most days of week-minimum of 150-300 minutes per week.   Obtain appropriate diet and hydration.   Calcium 1000 mg (in divided doses-max 600 mg at one time) + 600 vit D daily.  Birth control refilled as requested and sent to pharmacy on file. Take as directed (ACHES reviewed). Benefits, risks and alternatives discussed/reviewed.   HPV 9 vaccine series completed.   Annual mammogram starting at age 40, monthly breast self exam recommended.   Kegels 20 times twice daily.   Call your insurance company to verify coverage prior to completing any ordered tests.   Treatment for menstrual cramps/bleeding- Ibuprofen 600 mg by mouth with onset of bleeding or cramping, whichever is first. Take second dose of ibuprofen  400 mg by mouth with food and repeat every 6 hours x 3 days.  Return to office in one year or sooner, if needed.        Pelvic pain  Pelvic ultrasound ordered. Call to schedule.   Orders:    US pelvis complete w transvaginal; Future    Cigarette nicotine dependence without complication  Smoking cessation recommended.        Encounter for surveillance of contraceptive pills  Take birth control as directed.   Rx sent to pharmacy on file.  ACHES reviewed.   Aware of benefits, risks and alternatives of birth control.   Exercise 150 minutes per week minimum.     Orders:    norethindrone-ethinyl estradiol (JUNEL FE 1/20) 1-20 MG-MCG per tablet; Take one active pill po daily x 9 weeks then one week of inactive pills x one week.    Constipation, unspecified constipation type  Exercise  150 minutes minimum per week  Fiber 25 gm per day.  Increase water intake to 64 oz per day.            History of Present Illness   HPI  Celi Waller is a 34 y.o.  ( 15 yo boy ) female who is here today for her annual visit. No gynecologic health concerns.   Hx of benign thyroid tumor. Partial thyroidectomy.   Extended cycles on her BIANKA. She gets a bleed Q 3 months x 7 days with heavy flow x 4 days then mod to light flow. Menses is not acceptable due to increased lower pelvic cramping and heavier flow.   Admits to lower intermittent pelvic pain x 2 months. Was evaluated in ED and was told her had a fibroid uterus.   25 CT of abdomen and pelvic showed no acuted abdomen or pelvic findings. .  Exercise- not regularly but stays active.   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   Smokes socially 1-2 times per month. 1/2 cigarette each time.     Celi Waller is sexually active with male partner/ boyfriend of 14 years. Monogamous and feels safe in this relationship. Denies vaginal pain,bleeding or dryness.    She uses Junel   for contraception.    She is not interested in STD screening today.   She denies vaginal discharge, itching or pelvic pain.   She has no urinary concerns, does not have incontinence.  Admits to constipation. Drinks about 40 oz of water per day. Unknown amount of daily fiber intake.   No breast concerns.       Last pap:   19 normal   05/15/2023 normal with negative HR HPV   Mammogram: Not on file   Colonoscopy: Not on file  DEXA scan: Not on file  HPV vaccine series: Completed     Family history of cancer:   Cancer-related family history includes Breast cancer in her mother and paternal grandmother; Cancer in her paternal grandmother.    History obtained from: patient    Review of Systems   Constitutional: Negative.  Negative for activity change, appetite change, chills, diaphoresis, fatigue, fever and unexpected weight change.   HENT:  Negative for congestion, dental problem,  "sneezing, sore throat and trouble swallowing.    Eyes:  Negative for visual disturbance.   Respiratory:  Negative for chest tightness and shortness of breath.    Cardiovascular:  Negative for chest pain and leg swelling.   Gastrointestinal:  Negative for abdominal pain, constipation, diarrhea, nausea and vomiting.   Genitourinary:  Positive for menstrual problem. Negative for difficulty urinating, dyspareunia, dysuria, frequency, hematuria, pelvic pain, urgency, vaginal bleeding, vaginal discharge and vaginal pain.   Musculoskeletal:  Negative for back pain and neck pain.   Skin: Negative.    Allergic/Immunologic: Negative.    Neurological:  Negative for weakness and headaches.   Hematological:  Negative for adenopathy.   Psychiatric/Behavioral: Negative.       Medical History Reviewed by provider this encounter:  Tobacco  Allergies  Meds  Problems  Med Hx  Surg Hx  Fam Hx     .  Medications Ordered Prior to Encounter[1]   Social History[2]     Objective   /64 (BP Location: Left arm, Patient Position: Sitting, Cuff Size: Standard)   Ht 5' 4\" (1.626 m)   Wt 84.1 kg (185 lb 6.4 oz)   LMP 04/02/2025 (Approximate)   BMI 31.82 kg/m²      Physical Exam  Vitals and nursing note reviewed.   Constitutional:       Appearance: Normal appearance. She is well-developed.      Comments: BMI 31   HENT:      Head: Normocephalic and atraumatic.     Eyes:      General:         Right eye: No discharge.         Left eye: No discharge.     Neck:      Thyroid: No thyromegaly.      Trachea: Trachea normal.     Cardiovascular:      Rate and Rhythm: Normal rate and regular rhythm.      Heart sounds: Normal heart sounds.   Pulmonary:      Effort: Pulmonary effort is normal.      Breath sounds: Normal breath sounds.   Chest:   Breasts:     Breasts are symmetrical.      Right: Normal. No inverted nipple, mass, nipple discharge, skin change or tenderness.      Left: Normal. No inverted nipple, mass, nipple discharge, skin change " or tenderness.   Abdominal:      Palpations: Abdomen is soft.   Genitourinary:     General: Normal vulva.      Exam position: Lithotomy position.      Labia:         Right: No rash, tenderness, lesion or injury.         Left: No rash, tenderness, lesion or injury.       Urethra: No prolapse, urethral pain, urethral swelling or urethral lesion.      Vagina: Normal. No signs of injury and foreign body. No vaginal discharge, erythema, tenderness or bleeding.      Cervix: Normal.      Uterus: Normal.       Adnexa:         Right: Tenderness present. No mass or fullness.          Left: No mass, tenderness or fullness.        Rectum: No external hemorrhoid.      Comments: Very guarded with right lower pelvic palpation.     Musculoskeletal:         General: Normal range of motion.      Cervical back: Normal range of motion and neck supple.   Lymphadenopathy:      Head:      Right side of head: No submental, submandibular or tonsillar adenopathy.      Left side of head: No submental, submandibular or tonsillar adenopathy.      Cervical: No cervical adenopathy.      Upper Body:      Right upper body: No supraclavicular or axillary adenopathy.      Left upper body: No supraclavicular or axillary adenopathy.      Lower Body: No right inguinal adenopathy. No left inguinal adenopathy.     Skin:     General: Skin is warm and dry.     Neurological:      Mental Status: She is alert and oriented to person, place, and time.     Psychiatric:         Mood and Affect: Mood normal.         Behavior: Behavior normal.                [1]   Current Outpatient Medications on File Prior to Visit   Medication Sig Dispense Refill    albuterol (PROVENTIL HFA,VENTOLIN HFA) 90 mcg/act inhaler Inhale 2 puffs every 4 (four) hours as needed for wheezing      ALPRAZolam (XANAX PO) Take by mouth as needed      busPIRone (BUSPAR) 5 mg tablet Take 5 mg by mouth in the morning and 5 mg in the evening and 5 mg before bedtime.      fluticasone (FLONASE) 50  mcg/act nasal spray       Junel 1/20 1-20 MG-MCG per tablet Take 1 tablet by mouth in the morning      ketoconazole (NIZORAL) 2 % shampoo Use as shampoo at least 3 times per week to scalp. Lather into scalp and let sit for 5 minutes before rinsing. 100 mL 10    metoclopramide (Reglan) 10 mg tablet Take 1 tablet (10 mg total) by mouth every 6 (six) hours 30 tablet 0    ondansetron (Zofran ODT) 4 mg disintegrating tablet Take 1 tablet (4 mg total) by mouth every 6 (six) hours as needed for nausea or vomiting 20 tablet 0    ondansetron (ZOFRAN-ODT) 4 mg disintegrating tablet Take 1 tablet (4 mg total) by mouth every 6 (six) hours as needed for nausea or vomiting 20 tablet 0    Qvar RediHaler 80 MCG/ACT inhaler Inhale 2 puffs 2 (two) times a day as needed (cough) Rinse mouth after use. 10.6 g 5    sertraline (ZOLOFT) 100 mg tablet       [DISCONTINUED] norethindrone-ethinyl estradiol (JUNEL FE 1/20) 1-20 MG-MCG per tablet Take one active pill po daily x 9 weeks then one week of inactive pills x one week. 84 tablet 3    ergocalciferol (VITAMIN D2) 50,000 units  (Patient not taking: Reported on 6/16/2025)      ketorolac (TORADOL) 10 mg tablet Take 1 tablet (10 mg total) by mouth every 6 (six) hours as needed for moderate pain for up to 5 days 20 tablet 0    montelukast (SINGULAIR) 10 mg tablet TAKE ONE TABLET ( 10MG) BY MOUTH DAILY STARTING 2 DAYS BEFORE AND THE DAY OF [facility] IMMUNOTHERAPY. (Patient not taking: Reported on 6/16/2025) 30 tablet 0    [DISCONTINUED] famotidine (PEPCID) 20 mg tablet Take 20 mg by mouth 2 (two) times a day      [DISCONTINUED] Zepbound 2.5 MG/0.5ML auto-injector INJECT 2.5 MG SUBCUTANEOUSLY EVERY 7 DAYS (Patient not taking: Reported on 6/16/2025)       No current facility-administered medications on file prior to visit.   [2]   Social History  Tobacco Use    Smoking status: Some Days     Current packs/day: 0.50     Types: Cigarettes    Smokeless tobacco: Never    Tobacco comments:     socially    Vaping Use    Vaping status: Former    Substances: THC   Substance and Sexual Activity    Alcohol use: Yes     Alcohol/week: 9.0 standard drinks of alcohol     Types: 2 Glasses of wine, 4 Shots of liquor, 3 Standard drinks or equivalent per week     Comment: 1 drink daily    Drug use: Yes     Types: Marijuana     Comment: Medical card    Sexual activity: Yes     Partners: Male     Birth control/protection: OCP     Comment: Pill

## 2025-06-16 ENCOUNTER — ANNUAL EXAM (OUTPATIENT)
Dept: OBGYN CLINIC | Facility: CLINIC | Age: 35
End: 2025-06-16
Payer: COMMERCIAL

## 2025-06-16 VITALS
WEIGHT: 185.4 LBS | SYSTOLIC BLOOD PRESSURE: 132 MMHG | HEIGHT: 64 IN | BODY MASS INDEX: 31.65 KG/M2 | DIASTOLIC BLOOD PRESSURE: 64 MMHG

## 2025-06-16 DIAGNOSIS — Z30.41 ENCOUNTER FOR SURVEILLANCE OF CONTRACEPTIVE PILLS: ICD-10-CM

## 2025-06-16 DIAGNOSIS — K59.00 CONSTIPATION, UNSPECIFIED CONSTIPATION TYPE: ICD-10-CM

## 2025-06-16 DIAGNOSIS — Z01.411 ENCNTR FOR GYN EXAM (GENERAL) (ROUTINE) W ABNORMAL FINDINGS: Primary | ICD-10-CM

## 2025-06-16 DIAGNOSIS — F17.210 CIGARETTE NICOTINE DEPENDENCE WITHOUT COMPLICATION: ICD-10-CM

## 2025-06-16 DIAGNOSIS — R10.2 PELVIC PAIN: ICD-10-CM

## 2025-06-16 PROCEDURE — S0612 ANNUAL GYNECOLOGICAL EXAMINA: HCPCS | Performed by: NURSE PRACTITIONER

## 2025-06-16 RX ORDER — NORETHINDRONE ACETATE AND ETHINYL ESTRADIOL 20; 1 UG/1; MG/1
1 TABLET ORAL DAILY
COMMUNITY
Start: 2025-04-21

## 2025-06-16 RX ORDER — NORETHINDRONE ACETATE AND ETHINYL ESTRADIOL 1MG-20(21)
KIT ORAL
Qty: 84 TABLET | Refills: 4 | Status: SHIPPED | OUTPATIENT
Start: 2025-06-16

## 2025-06-16 NOTE — PATIENT INSTRUCTIONS
Pap with high risk HPV testing every 5 years, if normal. Due 2028  Sexually transmitted infection testing as indicated.Declined.   Exercise most days of week-minimum of 150-300 minutes per week.   Obtain appropriate diet and hydration.   Calcium 1000 mg (in divided doses-max 600 mg at one time) + 600 vit D daily.  Birth control refilled as requested and sent to pharmacy on file. Take as directed (ACHES reviewed). Benefits, risks and alternatives discussed/reviewed.   HPV 9 vaccine series completed.   Pelvic ultrasound ordered. Call to schedule.   Annual mammogram starting at age 40, monthly breast self exam recommended.   Kegels 20 times twice daily.   Call your insurance company to verify coverage prior to completing any ordered tests.   Treatment for menstrual cramps/bleeding- Ibuprofen 600 mg by mouth with onset of bleeding or cramping, whichever is first. Take second dose of ibuprofen  400 mg by mouth with food and repeat every 6 hours x 3 days.  Smoking cessation recommended.   Exercise 150 minutes minimum per week  Fiber 25 gm per day.  Increase water intake to 64 oz per day.   Return to office in one year or sooner, if needed.

## 2025-06-19 ENCOUNTER — HOSPITAL ENCOUNTER (OUTPATIENT)
Dept: RADIOLOGY | Age: 35
Discharge: HOME | End: 2025-06-19
Attending: NURSE PRACTITIONER
Payer: COMMERCIAL

## 2025-06-19 DIAGNOSIS — R10.2 PELVIC PAIN: ICD-10-CM

## 2025-06-19 PROCEDURE — 76830 TRANSVAGINAL US NON-OB: CPT

## 2025-06-19 PROCEDURE — 76856 US EXAM PELVIC COMPLETE: CPT

## 2025-06-27 NOTE — PROGRESS NOTES
Please review the decision aid used during our discussion regarding the Low dose lung cancer screening visit today.                           Pt self-d/c'ed

## 2025-07-07 ENCOUNTER — RESULTS FOLLOW-UP (OUTPATIENT)
Dept: OBGYN CLINIC | Facility: CLINIC | Age: 35
End: 2025-07-07

## 2025-08-07 ENCOUNTER — OFFICE VISIT (OUTPATIENT)
Dept: GASTROENTEROLOGY | Facility: MEDICAL CENTER | Age: 35
End: 2025-08-07

## 2025-08-07 VITALS
DIASTOLIC BLOOD PRESSURE: 84 MMHG | TEMPERATURE: 98.5 F | BODY MASS INDEX: 32.1 KG/M2 | SYSTOLIC BLOOD PRESSURE: 127 MMHG | OXYGEN SATURATION: 98 % | WEIGHT: 188 LBS | HEART RATE: 99 BPM | HEIGHT: 64 IN

## 2025-08-07 DIAGNOSIS — K21.9 GASTROESOPHAGEAL REFLUX DISEASE, UNSPECIFIED WHETHER ESOPHAGITIS PRESENT: ICD-10-CM

## 2025-08-07 DIAGNOSIS — R11.2 NAUSEA AND VOMITING, UNSPECIFIED VOMITING TYPE: Primary | ICD-10-CM

## 2025-08-07 DIAGNOSIS — R10.31 RLQ ABDOMINAL PAIN: ICD-10-CM

## 2025-08-07 DIAGNOSIS — R19.7 ACUTE DIARRHEA: ICD-10-CM

## 2025-08-07 RX ORDER — DICYCLOMINE HCL 20 MG
20 TABLET ORAL EVERY 6 HOURS
Qty: 120 TABLET | Refills: 1 | Status: SHIPPED | OUTPATIENT
Start: 2025-08-07